# Patient Record
Sex: FEMALE | Race: WHITE | NOT HISPANIC OR LATINO | Employment: UNEMPLOYED | URBAN - METROPOLITAN AREA
[De-identification: names, ages, dates, MRNs, and addresses within clinical notes are randomized per-mention and may not be internally consistent; named-entity substitution may affect disease eponyms.]

---

## 2018-10-13 ENCOUNTER — HOSPITAL ENCOUNTER (EMERGENCY)
Facility: CLINIC | Age: 18
Discharge: HOME | End: 2018-10-13
Attending: EMERGENCY MEDICINE | Admitting: EMERGENCY MEDICINE
Payer: COMMERCIAL

## 2018-10-13 VITALS
RESPIRATION RATE: 16 BRPM | DIASTOLIC BLOOD PRESSURE: 80 MMHG | TEMPERATURE: 99.4 F | OXYGEN SATURATION: 98 % | SYSTOLIC BLOOD PRESSURE: 112 MMHG | HEART RATE: 108 BPM

## 2018-10-13 DIAGNOSIS — J02.9 SORE THROAT: ICD-10-CM

## 2018-10-13 LAB — HETEROPH AB SER QL: NEGATIVE

## 2018-10-13 PROCEDURE — 25000132 ZZH RX MED GY IP 250 OP 250 PS 637: Performed by: EMERGENCY MEDICINE

## 2018-10-13 PROCEDURE — 25000128 H RX IP 250 OP 636: Performed by: EMERGENCY MEDICINE

## 2018-10-13 PROCEDURE — 86308 HETEROPHILE ANTIBODY SCREEN: CPT | Performed by: EMERGENCY MEDICINE

## 2018-10-13 PROCEDURE — 99284 EMERGENCY DEPT VISIT MOD MDM: CPT | Mod: 25

## 2018-10-13 PROCEDURE — 96374 THER/PROPH/DIAG INJ IV PUSH: CPT

## 2018-10-13 PROCEDURE — 96361 HYDRATE IV INFUSION ADD-ON: CPT

## 2018-10-13 PROCEDURE — 96375 TX/PRO/DX INJ NEW DRUG ADDON: CPT

## 2018-10-13 RX ORDER — KETOROLAC TROMETHAMINE 15 MG/ML
15 INJECTION, SOLUTION INTRAMUSCULAR; INTRAVENOUS ONCE
Status: COMPLETED | OUTPATIENT
Start: 2018-10-13 | End: 2018-10-13

## 2018-10-13 RX ORDER — ONDANSETRON 2 MG/ML
4 INJECTION INTRAMUSCULAR; INTRAVENOUS ONCE
Status: COMPLETED | OUTPATIENT
Start: 2018-10-13 | End: 2018-10-13

## 2018-10-13 RX ORDER — HYDROCODONE BITARTRATE AND ACETAMINOPHEN 5; 325 MG/1; MG/1
1 TABLET ORAL EVERY 4 HOURS PRN
Qty: 8 TABLET | Refills: 0 | Status: SHIPPED | OUTPATIENT
Start: 2018-10-13 | End: 2021-04-08

## 2018-10-13 RX ORDER — OXYCODONE HCL 5 MG/5 ML
5 SOLUTION, ORAL ORAL ONCE
Status: COMPLETED | OUTPATIENT
Start: 2018-10-13 | End: 2018-10-13

## 2018-10-13 RX ADMIN — ONDANSETRON 4 MG: 2 INJECTION INTRAMUSCULAR; INTRAVENOUS at 08:15

## 2018-10-13 RX ADMIN — OXYCODONE HYDROCHLORIDE 5 MG: 5 SOLUTION ORAL at 08:18

## 2018-10-13 RX ADMIN — KETOROLAC TROMETHAMINE 15 MG: 15 INJECTION, SOLUTION INTRAMUSCULAR; INTRAVENOUS at 08:17

## 2018-10-13 RX ADMIN — SODIUM CHLORIDE 1000 ML: 9 INJECTION, SOLUTION INTRAVENOUS at 08:15

## 2018-10-13 ASSESSMENT — ENCOUNTER SYMPTOMS
SORE THROAT: 1
HEADACHES: 1
NAUSEA: 1
FEVER: 1

## 2018-10-13 NOTE — ED TRIAGE NOTES
Patient comes in for evaluation of sore throat. Patient states she has chronic tonsillitis. Was seen by PCP on Thursday and started on antibiotic and steroid, tested negative for strep. Patient states the pain is much worse and she can't sleep. Has been taking tylenol and ibuprofen at home without relief. Has an appointment to see a specialist on Monday about having tonsils taken out. Notes that the pain is worse when lying down than sitting up. ABCs intact.

## 2018-10-13 NOTE — DISCHARGE INSTRUCTIONS
Continue antibiotics and steroids  Continue tylenol and ibuprofen   Follow up with ENT   Use norco for more severe pain. This is a narcotic so use minimally    Discharge Instructions  Sore Throat  You were seen today for a sore throat.  Most (>80%) sore throats are caused by a virus. Antibiotics do not help with viral infections, but you can fight off the virus on your own.  In this case, your sore throat would be treated with medications for your pain and fever.    Strep throat is a kind of sore throat caused by Group A streptococcus bacteria.  This type of sore throat is treated with antibiotics.  If you had a rapid test done today for strep throat and it did not show infection, a culture is done in some cases. The culture can take several days to complete. If the culture shows you have strep throat, we will call you and get you a prescription for antibiotics. We will not contact you with a negative culture result.  Generally, every Emergency Department visit should have a follow-up clinic visit with either a primary or a specialty clinic/provider. Please follow-up as instructed by your emergency provider today.  Return to the Emergency Department if:    If you have difficulty breathing.    If you are drooling because you are unable to swallow.    You become dehydrated due to difficulty drinking. Signs of dehydration include weakness, dry mouth, and urinating less than 3 times per day.    If you develop swelling of the neck or tongue.    If you develop a high fever with either severe or unusual headache or stiff neck.    Treatment:      Pain relief -- Non-prescription pain medications, such as Tylenol  (acetaminophen) or Motrin , Advil  (ibuprofen) are usually recommended for pain.  Do not use a medicine that you are allergic to, or if your provider has told you not to use it.    Soft or liquid diet. Concentrate on liquids to keep yourself hydrated. Cold liquids (popsicles, ice cream, etc.) may feel good on your  throat.  If you were given a prescription for medicine here today, be sure to read all of the information (including the package insert) that comes with your prescription.  This will include important information about the medicine, its side effects, and any warnings that you need to know about.  The pharmacist who fills the prescription can provide more information and answer questions you may have about the medicine.  If you have questions or concerns that the pharmacist cannot address, please call or return to the Emergency Department.   Remember that you can always come back to the Emergency Department if you are not able to see your regular provider in the amount of time listed above, if you get any new symptoms, or if there is anything that worries you.

## 2018-10-13 NOTE — LETTER
October 13, 2018      To Whom It May Concern:      Nicollette M Armijos was seen in our Emergency Department today, 10/13/18.  I expect her condition to improve over the next 2-3 days.  She may return to work when improved.    Sincerely,    Lili Regan RN

## 2018-10-13 NOTE — ED AVS SNAPSHOT
[facility] Emergency Department    [address] [place] [address]    Phone:  [phone]    Fax:  [phone]                                       Nicollette M Armijos   MRN: [id number]    Department:  [facility] Emergency Department   Date of Visit:  10/13/2018           Patient Information     Date Of Birth          2000        Your diagnoses for this visit were:     Sore throat        You were seen by Frieda Wilson MD.      Follow-up Information     Follow up with Kathrin Pedraza MD.    Contact information:    [facility]  [address] [place] [address]  [phone]          Follow up with [facility] Emergency [facility].    Specialty:  EMERGENCY MEDICINE    Why:  If symptoms worsen    Contact information:    [address] [place] [address]  [phone]        Discharge Instructions       Continue antibiotics and steroids  Continue tylenol and ibuprofen   Follow up with ENT   Use norco for more severe pain. This is a narcotic so use minimally    Discharge Instructions  Sore Throat  You were seen today for a sore throat.  Most (>80%) sore throats are caused by a virus. Antibiotics do not help with viral infections, but you can fight off the virus on your own.  In this case, your sore throat would be treated with medications for your pain and fever.    Strep throat is a kind of sore throat caused by Group A streptococcus bacteria.  This type of sore throat is treated with antibiotics.  If you had a rapid test done today for strep throat and it did not show infection, a culture is done in some cases. The culture can take several days to complete. If the culture shows you have strep throat, we will call you and get you a prescription for antibiotics. We will not contact you with a negative culture result.  Generally, every Emergency Department visit should have a follow-up clinic visit with either  a primary or a specialty clinic/provider. Please follow-up as instructed by your emergency provider today.  Return to the Emergency Department if:    If you have difficulty breathing.    If you are drooling because you are unable to swallow.    You become dehydrated due to difficulty drinking. Signs of dehydration include weakness, dry mouth, and urinating less than 3 times per day.    If you develop swelling of the neck or tongue.    If you develop a high fever with either severe or unusual headache or stiff neck.    Treatment:      Pain relief -- Non-prescription pain medications, such as Tylenol  (acetaminophen) or Motrin , Advil  (ibuprofen) are usually recommended for pain.  Do not use a medicine that you are allergic to, or if your provider has told you not to use it.    Soft or liquid diet. Concentrate on liquids to keep yourself hydrated. Cold liquids (popsicles, ice cream, etc.) may feel good on your throat.  If you were given a prescription for medicine here today, be sure to read all of the information (including the package insert) that comes with your prescription.  This will include important information about the medicine, its side effects, and any warnings that you need to know about.  The pharmacist who fills the prescription can provide more information and answer questions you may have about the medicine.  If you have questions or concerns that the pharmacist cannot address, please call or return to the Emergency Department.   Remember that you can always come back to the Emergency Department if you are not able to see your regular provider in the amount of time listed above, if you get any new symptoms, or if there is anything that worries you.      24 Hour Appointment Hotline       To make an appointment at any [facility], call [phone] ([phone]). If you don't have a family doctor or clinic, we will help you find one. [facility] clinics are conveniently located to serve the needs  of you and your family.             Review of your medicines      START taking        Dose / Directions Last dose taken    HYDROcodone-acetaminophen 5-325 MG per tablet   Commonly known as:  NORCO   Dose:  1 tablet   Quantity:  8 tablet        Take 1 tablet by mouth every 4 hours as needed for pain   Refills:  0          Our records show that you are taking the medicines listed below. If these are incorrect, please call your family doctor or clinic.        Dose / Directions Last dose taken    ZOLOFT PO        Take by mouth daily   Refills:  0                Information about OPIOIDS     PRESCRIPTION OPIOIDS: WHAT YOU NEED TO KNOW   We gave you an opioid (narcotic) pain medicine. It is important to manage your pain, but opioids are not always the best choice. You should first try all the other options your care team gave you. Take this medicine for as short a time (and as few doses) as possible.    Some activities can increase your pain, such as bandage changes or therapy sessions. It may help to take your pain medicine 30 to 60 minutes before these activities. Reduce your stress by getting enough sleep, working on hobbies you enjoy and practicing relaxation or meditation. Talk to your care team about ways to manage your pain beyond prescription opioids.    These medicines have risks:    DO NOT drive when on new or higher doses of pain medicine. These medicines can affect your alertness and reaction times, and you could be arrested for driving under the influence (DUI). If you need to use opioids long-term, talk to your care team about driving.    DO NOT operate heavy machinery    DO NOT do any other dangerous activities while taking these medicines.    DO NOT drink any alcohol while taking these medicines.     If the opioid prescribed includes acetaminophen, DO NOT take with any other medicines that contain acetaminophen. Read all labels carefully. Look for the word  acetaminophen  or  Tylenol.  Ask your pharmacist  if you have questions or are unsure.    You can get addicted to pain medicines, especially if you have a history of addiction (chemical, alcohol or substance dependence). Talk to your care team about ways to reduce this risk.    All opioids tend to cause constipation. Drink plenty of water and eat foods that have a lot of fiber, such as fruits, vegetables, prune juice, apple juice and high-fiber cereal. Take a laxative (Miralax, milk of magnesia, Colace, Senna) if you don t move your bowels at least every other day. Other side effects include upset stomach, sleepiness, dizziness, throwing up, tolerance (needing more of the medicine to have the same effect), physical dependence and slowed breathing.    Store your pills in a secure place, locked if possible. We will not replace any lost or stolen medicine. If you don t finish your medicine, please throw away (dispose) as directed by your pharmacist. The [place] [employer] has more information about safe disposal: https://www.pca.[facility].[place].us/living-green/managing-unwanted-medications        Prescriptions were sent or printed at these locations (1 Prescription)                   Other Prescriptions                Printed at Department/Unit printer (1 of 1)         HYDROcodone-acetaminophen (NORCO) 5-325 MG per tablet                Procedures and tests performed during your visit     Mononucleosis screen      Orders Needing Specimen Collection     None      Pending Results     No orders found from 10/11/2018 to 10/14/2018.            Pending Culture Results     No orders found from 10/11/2018 to 10/14/2018.            Pending Results Instructions     If you had any lab results that were not finalized at the time of your Discharge, you can call the ED Lab Result RN at [phone]. You will be contacted by this team for any positive Lab results or changes in treatment. The nurses are available 7 days a week from 10A to 6:30P.  You can leave a message 24  hours per day and they will return your call.        Test Results From Your Hospital Stay        10/13/2018  8:54 AM      Component Results     Component Value Ref Range & Units Status    Mononucleosis Screen Negative NEG^Negative Final                Clinical Quality Measure: Blood Pressure Screening     Your blood pressure was checked while you were in the emergency department today. The last reading we obtained was  BP: 103/78 . Please read the guidelines below about what these numbers mean and what you should do about them.  If your systolic blood pressure (the top number) is less than 120 and your diastolic blood pressure (the bottom number) is less than 80, then your blood pressure is normal. There is nothing more that you need to do about it.  If your systolic blood pressure (the top number) is 120-139 or your diastolic blood pressure (the bottom number) is 80-89, your blood pressure may be higher than it should be. You should have your blood pressure rechecked within a year by a primary care provider.  If your systolic blood pressure (the top number) is 140 or greater or your diastolic blood pressure (the bottom number) is 90 or greater, you may have high blood pressure. High blood pressure is treatable, but if left untreated over time it can put you at risk for heart attack, stroke, or kidney failure. You should have your blood pressure rechecked by a primary care provider within the next 4 weeks.  If your provider in the emergency department today gave you specific instructions to follow-up with your doctor or provider even sooner than that, you should follow that instruction and not wait for up to 4 weeks for your follow-up visit.        Thank you for choosing [facility]       Thank you for choosing [facility] for your care. Our goal is always to provide you with excellent care. Hearing back from our patients is one way we can continue to improve our services. Please take a few minutes to complete the written  "survey that you may receive in the mail after you visit with us. Thank you!        [employer] Information     [facility] lets you send messages to your doctor, view your test results, renew your prescriptions, schedule appointments and more. To sign up, go to [email or web address] . Click on \"Log in\" on the left side of the screen, which will take you to the Welcome page. Then click on \"Sign up Now\" on the right side of the page.     You will be asked to enter the access code listed below, as well as some personal information. Please follow the directions to create your username and password.     Your access code is: [email or web address]  Expires: 2019  9:16 AM     Your access code will  in 90 days. If you need help or a new code, please call your [facility] clinic or [phone].        Care EveryWhere ID     This is your Care EveryWhere ID. This could be used by other organizations to access your [facility] medical records  [id number]        Equal Access to Services     SANDOVAL WILD : Hadii jovan ernandezo Sojann, waaxda luqadaha, qaybta kaalmaawa reyse, mary aguilar . So [facility] [phone].    ATENCIÓN: Si habla español, tiene a church disposición servicios gratuitos de asistencia lingüística. Llame al [phone].    We comply with applicable federal civil rights laws and [place] laws. We do not discriminate on the basis of race, color, national origin, age, disability, sex, sexual orientation, or gender identity.            After Visit Summary       This is your record. Keep this with you and show to your community pharmacist(s) and doctor(s) at your next visit.                  "

## 2018-10-13 NOTE — LETTER
10/13/18      To Whom it may concern:    Shekhar Eden was in our Emergency Department today, 10/13/18. With a patient who needed their assistance.  Please excuse them from work today.      Sincerely,    Litzy Regan RN

## 2018-10-13 NOTE — ED PROVIDER NOTES
History     Chief Complaint:  Sore Throat    HPI Nicollette M Armijos is a 18 year old female with a history of recurrent tonsillitis who presents with a sore throat. The patient reports that 2 days ago she visited [facility] for a sore throat, but her rapid strept test was negative.  She states she was diagnosed with tonsillitis and started on a 10 day course of Augmentin and a 5 day course of 40 mg prednisone. She notes her sore throat has only gotten worse, despite taking these medications as well as tylenol and ibuprofen. Her last dose of OTC pain medications was last night. She reports she gets tonsillitis often, but has not had a sore throat as bad as this for a while, prompting her presentation to the ED today. She states her sore throat is worse whenever she yawns or swallow and makes eating painful. She notes her sore throat is also accompanied by a fever (max temp 99 at home), headache, nasal congestion, ear pain and nausea.     Allergies:  No known drug allergies    Medications:    Zoloft  Augmentin  Prednisone    Past Medical History:    Asthma  Ovarian cyst  Recurrent tonsillitis     Past Surgical History:    Dilation and curettage suction  Esophagogastroduodenoscopy  Intrauterine device insertion  Sigmoidoscopy flexible     Family History:    History reviewed. No pertinent family history.     Social History:  Marital Status:    Smoking status: Never Smoker  Alcohol use: No     Review of Systems   Constitutional: Positive for fever.   HENT: Positive for congestion (nasal), ear pain and sore throat.    Gastrointestinal: Positive for nausea.   Neurological: Positive for headaches.   All other systems reviewed and are negative.    Physical Exam     Patient Vitals for the past 24 hrs:   BP Temp Temp src Pulse Resp SpO2   10/13/18 0915 112/80 - - - - 98 %   10/13/18 0900 113/74 - - - - 98 %   10/13/18 0845 110/77 - - - - 100 %   10/13/18 0830 116/80 - - - - 99 %   10/13/18 0815 103/78 -  - - - -   10/13/18 0800 113/76 - - - - 99 %   10/13/18 0745 106/80 - - - - 98 %   10/13/18 0730 109/75 - - - - 99 %   10/13/18 0705 129/82 99.4  F (37.4  C) Oral 108 16 98 %       Physical Exam  General: Resting comfortably on the gurney  Eyes:  The pupils are equal and round    Conjunctivae and sclerae are normal  ENT:    Moist mucous membranes, mild posterior oropharynx erythema with no exudates, no trismus, voice normal, no facial swelling, mild tenderness  On anterior cervical lymphadenopathy. Floor of mouth soft  Neck:  Normal range of motion  CV:  Tachcyardic rate and rhythm    Skin warm and well perfused   Resp:  Lungs are clear    Non-labored    No rales    No wheezing   GI:  Abdomen is soft, there is no rigidity    No distension    No rebound tenderness     No abdominal tenderness  MS:  Normal muscular tone  Skin:  No rash or acute skin lesions noted  Neuro:   Awake, alert.      Speech is normal and fluent.    Face is symmetric.     Moves all extremities equally  Psych: Normal affect.  Appropriate interactions.    Emergency Department Course   Interventions:  0815: NS 1L IV Bolus  0815: Zofran 4 mg IV  0817: Toradol 15 mg IV  0818: Oxycodone 5 mg PO    Emergency Department Course:  Past medical records, nursing notes, and vitals reviewed.  0735: I performed an exam of the patient and obtained history, as documented above.   Blood samples were collected and sent for laboratory testing, findings above.    0752: I rechecked the patient. Explained findings to the patient.    0901: I rechecked the patient. Explained findings to the patient. She reported to being able to swallow with less discomfort.    Findings and plan explained to the patient. Patient discharged home with instructions regarding supportive care, medications, and reasons to return. The importance of close follow-up was reviewed.    Impression & Plan      Medical Decision Making:  Nicollette M Armijos is a 18 year old female who presents for  evaluation of a sore throat and clinical evidence of pharyngitis. The patient had a negative rapid strep 2 days ago at Urgent Care. Mononucleosis screen was negative today. There is no clinical evidence of peritonsillar abscess, retropharyngeal abscess, Lemierre's Syndrome, epiglottis, or Camilo's angina. The etiology is most likely viral.  I have recommended treatment with analgesics. Return if increasing pain, change in voice, neck pain, vomiting, fever, or shortness of breath. Has ENT f/u in 2 days. Given well appearance, I would not test further for other etiologies of serious bacterial infections.     Diagnosis:    ICD-10-CM   1. Sore throat J02.9       Disposition:  discharged to home    Discharge Medications:  New Prescriptions    HYDROCODONE-ACETAMINOPHEN (NORCO) 5-325 MG PER TABLET    Take 1 tablet by mouth every 4 hours as needed for pain         Roberta Sheriff  10/13/2018   [facility] EMERGENCY DEPARTMENT  I, Roberta Sheriff, am serving as a scribe at 7:35 AM on 10/13/2018 to document services personally performed by Frieda Wilson MD, based on my observations and the provider's statements to me.        Frieda Wilson MD  10/13/18 [id number]

## 2018-10-13 NOTE — ED AVS SNAPSHOT
[facility] Emergency Department    [address]    [facility] [address]    Phone:  [phone]    Fax:  [phone]                                       Nicollette M Armijos   MRN: [id number]    Department:  [facility] Emergency Department   Date of Visit:  10/13/2018           After Visit Summary Signature Page     I have received my discharge instructions, and my questions have been answered. I have discussed any challenges I see with this plan with the nurse or doctor.    ..........................................................................................................................................  Patient/Patient Representative Signature      ..........................................................................................................................................  Patient Representative Print Name and Relationship to Patient    ..................................................               ................................................  Date                                   Time    ..........................................................................................................................................  Reviewed by Signature/Title    ...................................................              ..............................................  Date                                               Time          22EPIC Rev 08/18

## 2018-10-14 ENCOUNTER — HEALTH MAINTENANCE LETTER (OUTPATIENT)
Age: 18
End: 2018-10-14

## 2018-11-07 ENCOUNTER — HOSPITAL ENCOUNTER (EMERGENCY)
Facility: CLINIC | Age: 18
Discharge: HOME | End: 2018-11-07
Attending: PHYSICIAN ASSISTANT | Admitting: PHYSICIAN ASSISTANT
Payer: COMMERCIAL

## 2018-11-07 VITALS
WEIGHT: 125 LBS | HEIGHT: 66 IN | SYSTOLIC BLOOD PRESSURE: 113 MMHG | BODY MASS INDEX: 20.09 KG/M2 | TEMPERATURE: 98.1 F | OXYGEN SATURATION: 96 % | RESPIRATION RATE: 18 BRPM | DIASTOLIC BLOOD PRESSURE: 64 MMHG

## 2018-11-07 DIAGNOSIS — Z90.89 POST-TONSILLECTOMY PAIN: ICD-10-CM

## 2018-11-07 DIAGNOSIS — G89.18 POST-TONSILLECTOMY PAIN: ICD-10-CM

## 2018-11-07 DIAGNOSIS — J95.830 POST-TONSILLECTOMY HEMORRHAGE: ICD-10-CM

## 2018-11-07 PROCEDURE — 25000125 ZZHC RX 250: Performed by: PHYSICIAN ASSISTANT

## 2018-11-07 PROCEDURE — 25000128 H RX IP 250 OP 636: Performed by: PHYSICIAN ASSISTANT

## 2018-11-07 PROCEDURE — 96374 THER/PROPH/DIAG INJ IV PUSH: CPT

## 2018-11-07 PROCEDURE — 96375 TX/PRO/DX INJ NEW DRUG ADDON: CPT

## 2018-11-07 PROCEDURE — 99284 EMERGENCY DEPT VISIT MOD MDM: CPT

## 2018-11-07 RX ORDER — KETOROLAC TROMETHAMINE 15 MG/ML
15 INJECTION, SOLUTION INTRAMUSCULAR; INTRAVENOUS ONCE
Status: COMPLETED | OUTPATIENT
Start: 2018-11-07 | End: 2018-11-07

## 2018-11-07 RX ORDER — DEXAMETHASONE SODIUM PHOSPHATE 4 MG/ML
10 INJECTION, SOLUTION INTRA-ARTICULAR; INTRALESIONAL; INTRAMUSCULAR; INTRAVENOUS; SOFT TISSUE ONCE
Status: COMPLETED | OUTPATIENT
Start: 2018-11-07 | End: 2018-11-07

## 2018-11-07 RX ADMIN — LIDOCAINE HYDROCHLORIDE 5 ML: 20 SOLUTION ORAL; TOPICAL at 15:28

## 2018-11-07 RX ADMIN — DEXAMETHASONE SODIUM PHOSPHATE 10 MG: 4 INJECTION, SOLUTION INTRAMUSCULAR; INTRAVENOUS at 13:45

## 2018-11-07 RX ADMIN — KETOROLAC TROMETHAMINE 15 MG: 15 INJECTION, SOLUTION INTRAMUSCULAR; INTRAVENOUS at 13:47

## 2018-11-07 ASSESSMENT — ENCOUNTER SYMPTOMS
CHILLS: 0
VOMITING: 0
NECK PAIN: 1
NAUSEA: 1
FEVER: 0
NECK STIFFNESS: 0
SORE THROAT: 1

## 2018-11-07 NOTE — ED TRIAGE NOTES
Patient states she had tonsils removed 8 days ago and was recovering from surgery fine until last night when she began having throat and ear pain. Patient states she began spitting up blood this morning. ABC Intact alert and no distress.

## 2018-11-07 NOTE — ED PROVIDER NOTES
"  History     Chief Complaint:  Post-op Bleeding    HPI   Nicollette M Armijos is a 18 year old female 8 days status post tonsillectomy who presents to the emergency department today for evaluation of post operative bleeding. The patient reports that she had been recovering otherwise well until this morning when she developed worsening pain in her ears and throat and began spitting up blood. She furthers that the blood began one hour ago and was heavy initially, however this has slowed and is now going down her throat. These things prompted her to call the nurse line who referred her to the ED for evaluation. Here the patient reports that she has been managing her pain with norco and ibuprofen, both of which were providing relief until last night. She endorses posterior neck and nausea due to the blood in her throat. She also explains that she had begun the transition to solid foods, however she consumed some mashed potatoes last night that \"burned her throat\" and she now has pain when consuming any solid or liquid. The patient denies fever, chills, vomiting, or difficulty with her range of motion of her neck. Of note, the patient has follow up scheduled with ENT for 11/27/18.    Allergies:  No Known Drug Allergies     Medications:    Norco  Zoloft    Past Medical History:    Asthma  Constipation  Ovarian cyst    Past Surgical History:    D&C  Insert intrauterine device  Sigmoidoscopy flexible    Family History:    Family history reviewed. No pertinent family history.    Social History:  Smoking Status: Never Smoker  Smokeless Tobacco: Never Used  Alcohol Use: Negative  Marital Status:        Review of Systems   Constitutional: Negative for chills and fever.   HENT: Positive for ear pain and sore throat (and bleeding).    Gastrointestinal: Positive for nausea. Negative for vomiting.   Musculoskeletal: Positive for neck pain. Negative for neck stiffness.     Physical Exam     Patient Vitals for the past 24 " "hrs:   BP Temp Temp src Heart Rate Resp SpO2 Height Weight   11/07/18 1500 113/64 - - - - 96 % - -   11/07/18 1459 - - - - - 98 % - -   11/07/18 1445 - - - - - 97 % - -   11/07/18 1407 - - - - - 98 % - -   11/07/18 1400 102/64 - - - - 97 % - -   11/07/18 1347 - - - - - 98 % - -   11/07/18 1330 111/74 - - - - - - -   11/07/18 1308 124/86 98.1  F (36.7  C) Oral 92 18 98 % 1.676 m (5' 6\") 56.7 kg (125 lb)     Physical Exam  General: Alert and interactive. Appears well. Cooperative and pleasant.   Eyes: The pupils are equal and round. EOMs intact. No scleral icterus.  ENT: No abnormalities to the external nose or ears. Mucous membranes moist. Slight oozing from right tonsillar bed. Left tonsillar bed eschar is healing well. Some localized erythema to posterior oropharynx. TMs clear and reflective of light bilaterally.    Neck: Trachea is in the midline. No nuchal rigidity.  Full range of motion.  CV: Regular rate and rhythm. S1 and S2 normal without murmur, click, gallop or rub.   Resp: Breath sounds are clear bilaterally, without rhonchi, wheezes, rales. Non-labored, no retractions or accessory muscle use.     GI: Abdomen is soft without distension. No tenderness to palpation. No peritoneal signs.    MS: Moving all extremities well. Good muscle tone.   Skin: Warm and dry. No rash or lesions noted.  Neuro: Alert and oriented x 3. No focal neurologic deficits. Good strength and sensation in upper and lower extremities. Psych: Awake. Alert.  Normal affect. Appropriate interactions.  Lymph: No anterior or posterior cervical lymphadenopathy noted.    Emergency Department Course     Interventions:  1345 Decadron 10 mg IV  1347 Toradol 15 mg IV  1528 Xylocaine 5 ml Oral    Emergency Department Course:    1306 Nursing notes and vitals reviewed.    1311 I performed an exam of the patient as documented above.     1531 I personally answered all related questions prior to discharge.    Impression & Plan      Medical Decision " Making:  Nicollette M Armijos is a 18 year old female 8 days status post tonsillectomy who presents to the emergency department today for evaluation of an increased pain and bleeding. She has not had any fever or chills but is having some difficulty eating and drinking due to pain in her oropharynx. On exam, she does have some oozing from the right tonsillar bed, although this is not bleeding profusely. This was well controlled here with some ice water gargles. For her pain here, I provided her with Toradol, Decadron, and Viscous Lidocaine rinse. She was improved and the bleeding was stopped on re-examination. I believe she is safe to return home. She was given viscous lidocaine to swish and spit if she has increasing pain, as this will help her be able to eat and drink. She can then take 800 mg ibuprofen 3x daily to help with inflammation. She should also call her ENT in the next few days and see if she can get in sooner for a follow up. She can take hydrocodone as needed if the ibuprofen is not helping her symptoms. Of course, if the patient has profuse bleeding that is not controlled by the ice water gargles, new fever chills, swelling in throat, difficulty swallowing or breathing, she should return immediately to the ED.    Diagnosis:    ICD-10-CM    1. Post-tonsillectomy hemorrhage J95.830    2. Post-tonsillectomy pain G89.18     Z90.89      Disposition:   The patient is discharged to home.    Discharge Medications:  Discharge Medication List as of 11/7/2018  3:20 PM      START taking these medications    Details   lidocaine, viscous, (XYLOCAINE) 2 % solution Take 15 mLs by mouth every 4 hours as needed for moderate pain swish and spit every 3-8 hours as needed; max 8 doses/24 hour period, Disp-100 mL, R-0, Local Print           Scribe Disclosure:  I, Jamia Raymond, am serving as a scribe at 1:16 PM on 11/7/2018 to document services personally performed by Marianne Matthews PA-C based on my observations  and the provider's statements to me.    [facility] [place] EMERGENCY DEPARTMENT       Marianne Matthews PA-C  11/07/18 9354

## 2018-11-07 NOTE — ED AVS SNAPSHOT
[facility] Emergency Department    [address]    [facility] [address]    Phone:  [phone]    Fax:  [phone]                                       Nicollette M Armijos   MRN: [id number]    Department:  [facility] Emergency Department   Date of Visit:  11/7/2018           Patient Information     Date Of Birth          2000        Your diagnoses for this visit were:     Post-tonsillectomy hemorrhage     Post-tonsillectomy pain        You were seen by Marianne Matthews PA-C.      Follow-up Information     Schedule an appointment as soon as possible for a visit with Janes Morrison DO.    Contact information:    [address]  [address] [facility] [address]  [phone]          Follow up with [facility] Emergency Department.    Specialty:  EMERGENCY MEDICINE    Why:  If symptoms worsen    Contact information:    [address]  [facility] [address] [phone]        Discharge Instructions         Managing Post-Op Pain at Home  Pain is expected after surgery. Know that you have a right to have this pain controlled. Managing pain helps you recover faster. Less pain means you can be active sooner. It also means less stress on the body and mind, which will help your body heal. When you go home after surgery, you will take charge of your pain management.  What is post-op pain?  Pain after surgery (post-op pain) is normal. How much pain you feel depends on the surgery. Your use of pain medicines and your sensitivity to pain are also factors. Each person feels pain differently. So try not to compare your pain with someone else s. Your healthcare team will need to know how you are feeling. Be honest. If you are in pain, say so.  Measuring your pain  A pain scale helps you rate pain intensity. In the scale, 0 means no pain, and 10 is the worst pain possible. Pain scales are not used to compare your pain  with another person's pain. A pain scale is used only to measure how your pain changes for you. You should rate your pain every few hours. You may feel some pain even with medicines. It is important to tell your healthcare provider if medicines don't reduce the pain. Be sure to mention if the pain suddenly increases or changes.     Your recovery  Your first hours at home, you may feel groggy or tired from the medicines given during surgery. As pain management methods used during surgery wear off, pain may increase. So be sure not to skip a dose of prescribed medicine. In fact, set an alarm or have someone remind you when it s time to take your medicine. During this time, try to rest, even if you feel pretty good. Within the first 24 hours, a nurse or other healthcare provider is likely to call and ask how you re doing.   Medicines for pain  Medicines can help to block pain, limit swelling and control related problems. You may be given more than one medicine to treat your pain. Medicines may be changed as you feel better, or if they cause side effects.   Pain medicine can be given in several ways: by injection, by mouth, as a patch, or as a suppository.  Medicines What they do Possible side effects   Non-steroidal anti-inflammatory drugs (NSAIDs) Reduce mild to moderate pain. They also help reduce swelling. Nausea, stomach and digestive problems, and kidney and liver problems. Certain NSAIDs may increase the risk for cardiovascular disease in some people.   Opioids (morphine and similar medicines often called narcotics) Reduce moderate to severe pain Nausea, vomiting, itching, drowsiness, constipation, slowed or shallow breathing   Other pain relievers (analgesics) Reduce mild to severe pain Constipation, nausea, dizziness, drowsiness, kidney and liver problems   Anti-vomiting medicine (antiemetics) Manage nausea Dizziness, drowsiness, muscle spasms   Seizure medicines (anticonvulsants) Manage nerve-related pain  Drowsiness, dizziness, liver problems   Medicines for depression (antidepressants) Manage chronic pain Dry mouth, drowsiness, dizziness, constipation   Non-medicine pain relief  Medicines are not the only way to manage pain after surgery.   You can use ice to help reduce swelling and pain. Use a cold pack or bag of ice cubes wrapped in a thin cloth. Never put ice directly on your skin. Use the ice for up to 20 minutes at a time every 3 to 4 hours.   You can also reduce swelling and pain by keeping the operated area above the level of your heart if you can. This helps blood and other fluids drain from the area.   You can also use relaxation to reduce pain. Try these techniques:    Visualization or guided imagery. You picture yourself in a quiet, peaceful place to help take your mind off the pain.    Progressive body relaxation. Starting at your feet, you clench and release your muscles. Work up your body slowly until you reach your neck and face.    Deep breathing. Breathe in deeply and hold your breath for a few seconds. Then exhale slowly to help relax your body.   Tips for controlling pain    Give pain medicine time to work. Most pain relievers taken by mouth need at least 20 to 30 minutes to take effect. They may not reach their maximum effect for close to an hour.     Take pain medicine at regular times as directed. Don t wait until the pain gets bad to take it.    Get plenty of rest. Taking your medicine at night may help you get a good night s rest.    If pain lessens, try taking your medicine less often or in smaller doses.  Safety tips for taking pain medicines    Ask your pharmacist if you need to take the medicine with food or milk to avoid an upset stomach.    Don t break, crush, or cut in half any long-acting medicines. This could be harmful.    Don t take more medicine than directed. If your pain isn t relieved, call your healthcare provider.    Try to time your medicine so that you take it before  starting an activity, such as dressing or sitting at the table for dinner.    Constipation is a common side effect with some pain medicines. Eating fruit, vegetables, and other foods high in fiber can help. Also drink plenty of fluids.    Don t drink alcohol while you are taking pain medicine. This can make you dizzy and slow your breathing. It can even be fatal.    Don t drive if you are taking opioid medicines.    Don t take opioids in combination with benzodiazepines. Doing so can cause serious health problems. These include extreme sleepiness, slowed breathing, and death. Let your healthcare provider know if you are taking benzodiazepines.     When to call your healthcare provider  Call your healthcare provider right away if:    Your pain is not relieved or if it gets worse    You can't take your pain medicines as prescribed    You have severe side effects like breathing problems, trouble waking up, dizziness, confusion, or severe constipation   Date Last Reviewed: 12/1/2017 2000-2018 [employer]. [address] [place] [address]. All rights reserved. This information is not intended as a substitute for professional medical care. Always follow your healthcare professional's instructions.          24 Hour Appointment Hotline       To make an appointment at any [facility], call [phone] ([phone]). If you don't have a family doctor or clinic, we will help you find one. [facility] clinics are conveniently located to serve the needs of you and your family.             Review of your medicines      START taking        Dose / Directions Last dose taken    lidocaine (viscous) 2 % solution   Commonly known as:  XYLOCAINE   Dose:  15 mL   Quantity:  100 mL        Take 15 mLs by mouth every 4 hours as needed for moderate pain swish and spit every 3-8 hours as needed; max 8 doses/24 hour period   Refills:  0          Our records show that you are taking the medicines listed below.  If these are incorrect, please call your family doctor or clinic.        Dose / Directions Last dose taken    HYDROcodone-acetaminophen 5-325 MG per tablet   Commonly known as:  NORCO   Dose:  1 tablet   Quantity:  8 tablet        Take 1 tablet by mouth every 4 hours as needed for pain   Refills:  0        ZOLOFT PO        Take by mouth daily   Refills:  0                Prescriptions were sent or printed at these locations (1 Prescription)                   Other Prescriptions                Printed at Department/Unit printer (1 of 1)         lidocaine, viscous, (XYLOCAINE) 2 % solution                Orders Needing Specimen Collection     None      Pending Results     No orders found from 11/5/2018 to 11/8/2018.            Pending Culture Results     No orders found from 11/5/2018 to 11/8/2018.            Pending Results Instructions     If you had any lab results that were not finalized at the time of your Discharge, you can call the ED Lab Result RN at [phone]. You will be contacted by this team for any positive Lab results or changes in treatment. The nurses are available 7 days a week from 10A to 6:30P.  You can leave a message 24 hours per day and they will return your call.        Test Results From Your Hospital Stay               Clinical Quality Measure: Blood Pressure Screening     Your blood pressure was checked while you were in the emergency department today. The last reading we obtained was  BP: 113/64 . Please read the guidelines below about what these numbers mean and what you should do about them.  If your systolic blood pressure (the top number) is less than 120 and your diastolic blood pressure (the bottom number) is less than 80, then your blood pressure is normal. There is nothing more that you need to do about it.  If your systolic blood pressure (the top number) is 120-139 or your diastolic blood pressure (the bottom number) is 80-89, your blood pressure may be higher than it should be.  "You should have your blood pressure rechecked within a year by a primary care provider.  If your systolic blood pressure (the top number) is 140 or greater or your diastolic blood pressure (the bottom number) is 90 or greater, you may have high blood pressure. High blood pressure is treatable, but if left untreated over time it can put you at risk for heart attack, stroke, or kidney failure. You should have your blood pressure rechecked by a primary care provider within the next 4 weeks.  If your provider in the emergency department today gave you specific instructions to follow-up with your doctor or provider even sooner than that, you should follow that instruction and not wait for up to 4 weeks for your follow-up visit.        Thank you for choosing [facility]       Thank you for choosing [facility] for your care. Our goal is always to provide you with excellent care. Hearing back from our patients is one way we can continue to improve our services. Please take a few minutes to complete the written survey that you may receive in the mail after you visit with us. Thank you!        [facility] Information     [facility] lets you send messages to your doctor, view your test results, renew your prescriptions, schedule appointments and more. To sign up, go to [email or web address] . Click on \"Log in\" on the left side of the screen, which will take you to the Welcome page. Then click on \"Sign up Now\" on the right side of the page.     You will be asked to enter the access code listed below, as well as some personal information. Please follow the directions to create your username and password.     Your access code is: [email or web address]  Expires: 2019  8:16 AM     Your access code will  in 90 days. If you need help or a new code, please call your [facility] clinic or [phone].        Care EveryWhere ID     This is your Care EveryWhere ID. This could be used by other organizations to access your [facility] medical " records  [id number]        Equal Access to Services     SANDOVAL WILD : Schuyler Valle, walter bethea, mary stewart. So [facility] [phone].    ATENCIÓN: Si habla español, tiene a church disposición servicios gratuitos de asistencia lingüística. Llame al [phone].    We comply with applicable federal civil rights laws and [place] laws. We do not discriminate on the basis of race, color, national origin, age, disability, sex, sexual orientation, or gender identity.            After Visit Summary       This is your record. Keep this with you and show to your community pharmacist(s) and doctor(s) at your next visit.

## 2018-11-07 NOTE — ED AVS SNAPSHOT
[facility] Emergency Department    [address]    [facility] [address]    Phone:  [phone]    Fax:  [phone]                                       Nicollette M Armijos   MRN: [id number]    Department:  [facility] Emergency Department   Date of Visit:  11/7/2018           After Visit Summary Signature Page     I have received my discharge instructions, and my questions have been answered. I have discussed any challenges I see with this plan with the nurse or doctor.    ..........................................................................................................................................  Patient/Patient Representative Signature      ..........................................................................................................................................  Patient Representative Print Name and Relationship to Patient    ..................................................               ................................................  Date                                   Time    ..........................................................................................................................................  Reviewed by Signature/Title    ...................................................              ..............................................  Date                                               Time          22EPIC Rev 08/18

## 2018-11-07 NOTE — DISCHARGE INSTRUCTIONS
Managing Post-Op Pain at Home  Pain is expected after surgery. Know that you have a right to have this pain controlled. Managing pain helps you recover faster. Less pain means you can be active sooner. It also means less stress on the body and mind, which will help your body heal. When you go home after surgery, you will take charge of your pain management.  What is post-op pain?  Pain after surgery (post-op pain) is normal. How much pain you feel depends on the surgery. Your use of pain medicines and your sensitivity to pain are also factors. Each person feels pain differently. So try not to compare your pain with someone else s. Your healthcare team will need to know how you are feeling. Be honest. If you are in pain, say so.  Measuring your pain  A pain scale helps you rate pain intensity. In the scale, 0 means no pain, and 10 is the worst pain possible. Pain scales are not used to compare your pain with another person's pain. A pain scale is used only to measure how your pain changes for you. You should rate your pain every few hours. You may feel some pain even with medicines. It is important to tell your healthcare provider if medicines don't reduce the pain. Be sure to mention if the pain suddenly increases or changes.     Your recovery  Your first hours at home, you may feel groggy or tired from the medicines given during surgery. As pain management methods used during surgery wear off, pain may increase. So be sure not to skip a dose of prescribed medicine. In fact, set an alarm or have someone remind you when it s time to take your medicine. During this time, try to rest, even if you feel pretty good. Within the first 24 hours, a nurse or other healthcare provider is likely to call and ask how you re doing.   Medicines for pain  Medicines can help to block pain, limit swelling and control related problems. You may be given more than one medicine to treat your pain. Medicines may be changed as you feel  better, or if they cause side effects.   Pain medicine can be given in several ways: by injection, by mouth, as a patch, or as a suppository.  Medicines What they do Possible side effects   Non-steroidal anti-inflammatory drugs (NSAIDs) Reduce mild to moderate pain. They also help reduce swelling. Nausea, stomach and digestive problems, and kidney and liver problems. Certain NSAIDs may increase the risk for cardiovascular disease in some people.   Opioids (morphine and similar medicines often called narcotics) Reduce moderate to severe pain Nausea, vomiting, itching, drowsiness, constipation, slowed or shallow breathing   Other pain relievers (analgesics) Reduce mild to severe pain Constipation, nausea, dizziness, drowsiness, kidney and liver problems   Anti-vomiting medicine (antiemetics) Manage nausea Dizziness, drowsiness, muscle spasms   Seizure medicines (anticonvulsants) Manage nerve-related pain Drowsiness, dizziness, liver problems   Medicines for depression (antidepressants) Manage chronic pain Dry mouth, drowsiness, dizziness, constipation   Non-medicine pain relief  Medicines are not the only way to manage pain after surgery.   You can use ice to help reduce swelling and pain. Use a cold pack or bag of ice cubes wrapped in a thin cloth. Never put ice directly on your skin. Use the ice for up to 20 minutes at a time every 3 to 4 hours.   You can also reduce swelling and pain by keeping the operated area above the level of your heart if you can. This helps blood and other fluids drain from the area.   You can also use relaxation to reduce pain. Try these techniques:    Visualization or guided imagery. You picture yourself in a quiet, peaceful place to help take your mind off the pain.    Progressive body relaxation. Starting at your feet, you clench and release your muscles. Work up your body slowly until you reach your neck and face.    Deep breathing. Breathe in deeply and hold your breath for a few  seconds. Then exhale slowly to help relax your body.   Tips for controlling pain    Give pain medicine time to work. Most pain relievers taken by mouth need at least 20 to 30 minutes to take effect. They may not reach their maximum effect for close to an hour.     Take pain medicine at regular times as directed. Don t wait until the pain gets bad to take it.    Get plenty of rest. Taking your medicine at night may help you get a good night s rest.    If pain lessens, try taking your medicine less often or in smaller doses.  Safety tips for taking pain medicines    Ask your pharmacist if you need to take the medicine with food or milk to avoid an upset stomach.    Don t break, crush, or cut in half any long-acting medicines. This could be harmful.    Don t take more medicine than directed. If your pain isn t relieved, call your healthcare provider.    Try to time your medicine so that you take it before starting an activity, such as dressing or sitting at the table for dinner.    Constipation is a common side effect with some pain medicines. Eating fruit, vegetables, and other foods high in fiber can help. Also drink plenty of fluids.    Don t drink alcohol while you are taking pain medicine. This can make you dizzy and slow your breathing. It can even be fatal.    Don t drive if you are taking opioid medicines.    Don t take opioids in combination with benzodiazepines. Doing so can cause serious health problems. These include extreme sleepiness, slowed breathing, and death. Let your healthcare provider know if you are taking benzodiazepines.     When to call your healthcare provider  Call your healthcare provider right away if:    Your pain is not relieved or if it gets worse    You can't take your pain medicines as prescribed    You have severe side effects like breathing problems, trouble waking up, dizziness, confusion, or severe constipation   Date Last Reviewed: 12/1/2017 2000-2018 [employer]  [address] [place] [address]. All rights reserved. This information is not intended as a substitute for professional medical care. Always follow your healthcare professional's instructions.

## 2019-03-12 ENCOUNTER — APPOINTMENT (OUTPATIENT)
Dept: GENERAL RADIOLOGY | Facility: CLINIC | Age: 19
End: 2019-03-12
Attending: NURSE PRACTITIONER

## 2019-03-12 ENCOUNTER — HOSPITAL ENCOUNTER (EMERGENCY)
Facility: CLINIC | Age: 19
Discharge: HOME | End: 2019-03-12
Attending: NURSE PRACTITIONER | Admitting: NURSE PRACTITIONER

## 2019-03-12 VITALS
HEART RATE: 139 BPM | RESPIRATION RATE: 16 BRPM | TEMPERATURE: 99 F | DIASTOLIC BLOOD PRESSURE: 70 MMHG | OXYGEN SATURATION: 98 % | SYSTOLIC BLOOD PRESSURE: 101 MMHG

## 2019-03-12 DIAGNOSIS — J18.9 PNEUMONIA: ICD-10-CM

## 2019-03-12 PROCEDURE — 25000132 ZZH RX MED GY IP 250 OP 250 PS 637: Performed by: NURSE PRACTITIONER

## 2019-03-12 PROCEDURE — 71046 X-RAY EXAM CHEST 2 VIEWS: CPT

## 2019-03-12 PROCEDURE — 99283 EMERGENCY DEPT VISIT LOW MDM: CPT

## 2019-03-12 RX ORDER — AZITHROMYCIN 250 MG/1
TABLET, FILM COATED ORAL
Qty: 6 TABLET | Refills: 0 | Status: SHIPPED | OUTPATIENT
Start: 2019-03-12 | End: 2021-04-08

## 2019-03-12 RX ORDER — ACETAMINOPHEN 500 MG
1000 TABLET ORAL ONCE
Status: COMPLETED | OUTPATIENT
Start: 2019-03-12 | End: 2019-03-12

## 2019-03-12 RX ORDER — IBUPROFEN 600 MG/1
600 TABLET, FILM COATED ORAL ONCE
Status: COMPLETED | OUTPATIENT
Start: 2019-03-12 | End: 2019-03-12

## 2019-03-12 RX ADMIN — ACETAMINOPHEN 1000 MG: 500 TABLET, FILM COATED ORAL at 10:15

## 2019-03-12 RX ADMIN — IBUPROFEN 600 MG: 600 TABLET, FILM COATED ORAL at 10:15

## 2019-03-12 ASSESSMENT — ENCOUNTER SYMPTOMS
SORE THROAT: 1
NAUSEA: 1
VOMITING: 0
FEVER: 1
LIGHT-HEADEDNESS: 1
DIARRHEA: 0
DIZZINESS: 1

## 2019-03-12 NOTE — ED PROVIDER NOTES
History     Chief Complaint:  Flu Symptoms    HPI   Nicollette M Armijos is a 18 year old female who presents with a female  to the Emergency Department today for evaluation of flu symptoms. Patient reports she had a phone diagnosis of the flu three days ago. This morning she woke up with a fever of 103.3. She is dizzy and reports throat and chest pain when she breathes. She is worried about her breathing because her asthma worsens when she is sick. She came in today because she was unable to get out of bed this morning and felt as if she was going to pass out. She is nauseous but has not vomited. No diarrhea. She has been taking Tylenol and Ibuprofen every 4-6 hours and her last dose was at 4:30 AM this morning.     Allergies:  No known drug allergies    Medications:    Zoloft     Past Medical History:    Left-sided abdominal pain  Asthma  Ovarian cyst  Constipation    Past Surgical History:    Dilation and curettage suction  Insert intrauterine device    Family History:    History reviewed. No pertinent family history.     Social History:  Smoking status: Never smoker  Alcohol use: No  Marital Status:   [2]     Review of Systems   Constitutional: Positive for fever.   HENT: Positive for sore throat.    Cardiovascular: Positive for chest pain.   Gastrointestinal: Positive for nausea. Negative for diarrhea and vomiting.   Neurological: Positive for dizziness and light-headedness.   All other systems reviewed and are negative.      Physical Exam     Patient Vitals for the past 24 hrs:   BP Temp Temp src Pulse Resp SpO2   03/12/19 1011 98/63 -- -- 134 18 99 %   03/12/19 0908 103/73 102.9  F (39.4  C) Oral 129 16 99 %       Physical Exam    General: Alert, Mild  discomfort, well kept  Eyes: PERRL, conjunctivae pink no scleral icterus or conjunctival injection  ENT:   Moist mucus membranes, posterior oropharynx clear without erythema or exudates, No lymphadenopathy, Normal voice  Resp:  Occasional  coarse cough. Lungs clear to auscultation bilaterally, no crackles/rubs/wheezes. Good air movement  CV:  Tachycardic rate and normal rhythm, no murmurs/rubs/gallops  GI:  Abdomen soft and non-distended.  Normoactive BS.  No tenderness, guarding or rebound, No masses  Skin:  Warm, mildly diaphoretic.  No rashes or petechiae  Musculoskeletal: No peripheral edema or calf tenderness, Normal gross ROM   Neuro: Alert and oriented to person/place/time, normal sensation  Psychiatric: Normal affect, cooperative, good eye contact    Emergency Department Course     Imaging:  Radiographic findings were communicated with the patient who voiced understanding of the findings.  XR Chest 2 Views   IMPRESSION: Small area of mild nodular opacity in the left midlung  could represent a mild pneumonia. The lungs are otherwise clear. No  pleural effusions. Heart size and pulmonary vascularity are within  normal limits.  As read by radiology.    Interventions:  1015: Tylenol 1000 MG PO  1015: Ibuprofen 600 MG PO    Emergency Department Course:  Past medical records, nursing notes, and vitals reviewed.  1004: I performed an exam of the patient and obtained history, as documented above.    The patient was sent for a chest x-ray while in the emergency department, findings above.    1102: I rechecked the patient. Explained findings to the patient.    Findings and plan explained to the Patient. Patient discharged home with instructions regarding supportive care, medications, and reasons to return. The importance of close follow-up was reviewed.      Impression & Plan      Medical Decision Making:  Nicollette M Armijos is a 18 year old female presents for evaluation of upper respiratory symptoms. Patient is concerned for continued symptoms after dx of influenza. Evaluation consisted of CXR, physical exam; positive CXR. Exam consistent with pneumonia and flu like illness. No meningismus, pneumothorax, or pleural effusion. No evidence of sepsis.  Rx for Azitromycin given. Discharged with advice for symptomatic treatment including over the counter medication such as Tylenol and Ibuprofen. Advised to follow up with primary care provider in 5-7 days if continued symptoms, sooner if worsening. Patient will return to the ER/UR if they develop high fevers not controlled with medication, difficulty breathing, shortness of breath, or has other concerns.     Diagnosis:    ICD-10-CM   1. Pneumonia J18.9     Disposition:  discharged to home    Discharge Medications:     Medication List      Started    azithromycin 250 MG tablet  Commonly known as:  ZITHROMAX  2 tabs day one, 1 tab days 2-5          Valerie Hernandez  3/12/2019   [facility] EMERGENCY DEPARTMENT  Scribe Disclosure:  I, Valerie Hernandez, am serving as a scribe at 10:04 AM on 3/12/2019 to document services personally performed by Atilio Arita APRN based on my observations and the provider's statements to me.        Atilio Arita APRN CNP  03/12/19 1444

## 2019-03-12 NOTE — LETTER
March 12, 2019      To Whom It May Concern:      Nicollette M Armijos was seen in our Emergency Department today, 03/12/19.  I expect her condition to improve over the next 2 days.  She may return to work/school when improved.    Sincerely,        Shahnaz Sharp RN

## 2019-03-12 NOTE — ED NOTES
Pt verbalized understanding of discharge teaching and prescription. Patient denies any questions or concerns at this time.

## 2019-03-12 NOTE — ED AVS SNAPSHOT
[facility] Emergency Department  [address]  [facility] [address]  Phone:  [phone]  Fax:  [phone]                                    Nicollette M Armijos   MRN: [id number]    Department:  [facility] Emergency Department   Date of Visit:  3/12/2019           After Visit Summary Signature Page    I have received my discharge instructions, and my questions have been answered. I have discussed any challenges I see with this plan with the nurse or doctor.    ..........................................................................................................................................  Patient/Patient Representative Signature      ..........................................................................................................................................  Patient Representative Print Name and Relationship to Patient    ..................................................               ................................................  Date                                   Time    ..........................................................................................................................................  Reviewed by Signature/Title    ...................................................              ..............................................  Date                                               Time          22EPIC Rev 08/18

## 2020-10-02 DIAGNOSIS — Z11.59 ENCOUNTER FOR SCREENING FOR OTHER VIRAL DISEASES: Primary | ICD-10-CM

## 2020-10-08 ENCOUNTER — HOSPITAL ENCOUNTER (OUTPATIENT)
Dept: GENERAL RADIOLOGY | Facility: CLINIC | Age: 20
Discharge: HOME | End: 2020-10-08
Attending: OBSTETRICS & GYNECOLOGY | Admitting: OBSTETRICS & GYNECOLOGY
Payer: COMMERCIAL

## 2020-10-08 DIAGNOSIS — Z31.41 FERTILITY TESTING: ICD-10-CM

## 2020-10-08 PROCEDURE — 255N000002 HC RX 255 OP 636: Performed by: OBSTETRICS & GYNECOLOGY

## 2020-10-08 PROCEDURE — 74740 X-RAY FEMALE GENITAL TRACT: CPT

## 2020-10-08 RX ORDER — IOPAMIDOL 510 MG/ML
50 INJECTION, SOLUTION INTRAVASCULAR ONCE
Status: COMPLETED | OUTPATIENT
Start: 2020-10-08 | End: 2020-10-08

## 2020-10-08 RX ADMIN — IOPAMIDOL 10 ML: 510 INJECTION, SOLUTION INTRAVASCULAR at 11:49

## 2020-10-08 NOTE — OP NOTE
Hysterosalpingogram Operative Note    PREOPERATIVE DIAGNOSES:   1. Infertility  POSTOPERATIVE DIAGNOSES:   1. Same  PROCEDURE: Hysterosalpingogram  ESTIMATED BLOOD LOSS: 0 ml  ANESTHESIA: None  FINDINGS: Normal appearing uterine cavity, dye spilling from both fallopian tubes  COMPLICATIONS: None   INDICATIONS: Patient is a 20 year old  female currently undergoing evaluation for infertility  PROCEDURE: The patient was brought to the radiology suite and placed in the dorsal lithotomy position. Bimanual exam showed a small anteverted uterus. A sterile speculum was placed in the vagina and the cervix was cleansed with betadine. The HSG plastic catheter was inserted into the cervix and the balloon filled with air. Dye was then slowly injected into the uterine cavity under x-ray visualization and spillage was noted from bilateral fallopian tubes. The shape of the uterine cavity appeared normal.   The balloon was then deflated and the catheter removed. The tenaculum was removed from the cervix, which was hemostatic, and the speculum was removed. The patient tolerated the procedure well with moderate amount of cramping.   Ruth Hull MD

## 2021-01-27 ENCOUNTER — IMMUNIZATION (OUTPATIENT)
Dept: NURSING | Facility: CLINIC | Age: 21
End: 2021-01-27
Payer: COMMERCIAL

## 2021-01-27 PROCEDURE — 0001A PR COVID VAC PFIZER DIL RECON 30 MCG/0.3 ML IM: CPT

## 2021-01-27 PROCEDURE — 91300 PR COVID VAC PFIZER DIL RECON 30 MCG/0.3 ML IM: CPT

## 2021-02-07 ENCOUNTER — HEALTH MAINTENANCE LETTER (OUTPATIENT)
Age: 21
End: 2021-02-07

## 2021-02-17 ENCOUNTER — IMMUNIZATION (OUTPATIENT)
Dept: NURSING | Facility: CLINIC | Age: 21
End: 2021-02-17
Attending: FAMILY MEDICINE
Payer: COMMERCIAL

## 2021-02-17 PROCEDURE — 0002A PR COVID VAC PFIZER DIL RECON 30 MCG/0.3 ML IM: CPT

## 2021-02-17 PROCEDURE — 91300 PR COVID VAC PFIZER DIL RECON 30 MCG/0.3 ML IM: CPT

## 2021-03-25 ENCOUNTER — TELEPHONE (OUTPATIENT)
Dept: OBGYN | Facility: CLINIC | Age: 21
End: 2021-03-25

## 2021-03-25 NOTE — TELEPHONE ENCOUNTER
LMP 1/28.  8w today.  C/o nausea, dizziness and some constipation.  Can keep water down.   Last BM 4 days ago, small amounts.    Advised B6/Unisom.  Frequent intake of small amounts of liquid.  Small amounts of bland food.  Bibiana/Peppermint.  Can try adding metamucil or Colace.  Reviewed senna use occasionally PRN.    Will call back if sx not improving.    Tatyana Diaz RN

## 2021-03-26 ENCOUNTER — ANCILLARY PROCEDURE (OUTPATIENT)
Dept: ULTRASOUND IMAGING | Facility: CLINIC | Age: 21
End: 2021-03-26
Payer: COMMERCIAL

## 2021-03-26 ENCOUNTER — TELEPHONE (OUTPATIENT)
Dept: OBGYN | Facility: CLINIC | Age: 21
End: 2021-03-26

## 2021-03-26 DIAGNOSIS — Z34.90 SUPERVISION OF NORMAL PREGNANCY: ICD-10-CM

## 2021-03-26 DIAGNOSIS — Z34.90 SUPERVISION OF NORMAL PREGNANCY: Primary | ICD-10-CM

## 2021-03-26 LAB
ABO + RH BLD: NORMAL
ABO + RH BLD: NORMAL
ALBUMIN UR-MCNC: NEGATIVE MG/DL
APPEARANCE UR: CLEAR
BILIRUB UR QL STRIP: NEGATIVE
BLD GP AB SCN SERPL QL: NORMAL
BLOOD BANK CMNT PATIENT-IMP: NORMAL
COLOR UR AUTO: YELLOW
ERYTHROCYTE [DISTWIDTH] IN BLOOD BY AUTOMATED COUNT: 12.6 % (ref 10–15)
GLUCOSE UR STRIP-MCNC: NEGATIVE MG/DL
HBV SURFACE AG SERPL QL IA: NONREACTIVE
HCT VFR BLD AUTO: 37.2 % (ref 35–47)
HGB BLD-MCNC: 12.2 G/DL (ref 11.7–15.7)
HGB UR QL STRIP: NEGATIVE
HIV 1+2 AB+HIV1 P24 AG SERPL QL IA: NONREACTIVE
KETONES UR STRIP-MCNC: NEGATIVE MG/DL
LEUKOCYTE ESTERASE UR QL STRIP: NEGATIVE
MCH RBC QN AUTO: 31.3 PG (ref 26.5–33)
MCHC RBC AUTO-ENTMCNC: 32.8 G/DL (ref 31.5–36.5)
MCV RBC AUTO: 95 FL (ref 78–100)
NITRATE UR QL: NEGATIVE
PH UR STRIP: 6 PH (ref 5–7)
PLATELET # BLD AUTO: 241 10E9/L (ref 150–450)
RBC # BLD AUTO: 3.9 10E12/L (ref 3.8–5.2)
SOURCE: NORMAL
SP GR UR STRIP: 1.01 (ref 1–1.03)
SPECIMEN EXP DATE BLD: NORMAL
T PALLIDUM AB SER QL: NONREACTIVE
UROBILINOGEN UR STRIP-ACNC: 0.2 EU/DL (ref 0.2–1)
WBC # BLD AUTO: 11.6 10E9/L (ref 4–11)

## 2021-03-26 PROCEDURE — 81003 URINALYSIS AUTO W/O SCOPE: CPT | Performed by: ADVANCED PRACTICE MIDWIFE

## 2021-03-26 PROCEDURE — 76801 OB US < 14 WKS SINGLE FETUS: CPT | Performed by: FAMILY MEDICINE

## 2021-03-26 PROCEDURE — 85027 COMPLETE CBC AUTOMATED: CPT | Performed by: ADVANCED PRACTICE MIDWIFE

## 2021-03-26 PROCEDURE — 86900 BLOOD TYPING SEROLOGIC ABO: CPT | Performed by: ADVANCED PRACTICE MIDWIFE

## 2021-03-26 PROCEDURE — 86780 TREPONEMA PALLIDUM: CPT | Mod: 90 | Performed by: ADVANCED PRACTICE MIDWIFE

## 2021-03-26 PROCEDURE — 36415 COLL VENOUS BLD VENIPUNCTURE: CPT | Performed by: ADVANCED PRACTICE MIDWIFE

## 2021-03-26 PROCEDURE — 99000 SPECIMEN HANDLING OFFICE-LAB: CPT | Performed by: ADVANCED PRACTICE MIDWIFE

## 2021-03-26 PROCEDURE — 87340 HEPATITIS B SURFACE AG IA: CPT | Performed by: ADVANCED PRACTICE MIDWIFE

## 2021-03-26 PROCEDURE — 86901 BLOOD TYPING SEROLOGIC RH(D): CPT | Performed by: ADVANCED PRACTICE MIDWIFE

## 2021-03-26 PROCEDURE — 87086 URINE CULTURE/COLONY COUNT: CPT | Performed by: ADVANCED PRACTICE MIDWIFE

## 2021-03-26 PROCEDURE — 87389 HIV-1 AG W/HIV-1&-2 AB AG IA: CPT | Performed by: ADVANCED PRACTICE MIDWIFE

## 2021-03-26 PROCEDURE — 86762 RUBELLA ANTIBODY: CPT | Performed by: ADVANCED PRACTICE MIDWIFE

## 2021-03-26 PROCEDURE — 86850 RBC ANTIBODY SCREEN: CPT | Performed by: ADVANCED PRACTICE MIDWIFE

## 2021-03-26 NOTE — TELEPHONE ENCOUNTER
Pt calling with low pelvic/rectal pain.    Started off intermittent and on each side, but is now more constant and radiating to her rectum.    Pt is constipated, has not had BM for 5 days.  Took colace once but not relief.      8w1d by LMP.  US not scheduled for a couple weeks.  Rescheduled US for today.    Ectopic/miscarriage precautions given.  Routing to on call provider to review if anything else should be done.    Tatyana Diaz RN

## 2021-03-26 NOTE — TELEPHONE ENCOUNTER
Called to review normal early ultrasound-- may change ESTEBAN based on this ultrasound. Nicollette shares she had an unusual cycle in January so she is open to the idea of a different date. Will release other IOB labs as they result-- Nicollette is unsure of her blood type. Reviewed precautions for rh negative & bleeding in pregnancy.    Reviewed comfort measures for constipation in pregnancy-- Nicollette reports this has been an ongoing issue since she was a baby. Will work on increasing fruits & vegetables, hydration, and try OTC medications in a stepwise approach.     Encouraged to call nurse line at any time with questions or concerns, or office visit next week if constipation does not resolve. No further questions or concerns today.    Shalonda Lee, ARIAN, CNM

## 2021-03-27 ENCOUNTER — HOSPITAL ENCOUNTER (EMERGENCY)
Facility: CLINIC | Age: 21
Discharge: HOME | End: 2021-03-27
Attending: EMERGENCY MEDICINE | Admitting: EMERGENCY MEDICINE
Payer: COMMERCIAL

## 2021-03-27 ENCOUNTER — NURSE TRIAGE (OUTPATIENT)
Dept: NURSING | Facility: CLINIC | Age: 21
End: 2021-03-27

## 2021-03-27 VITALS
HEART RATE: 102 BPM | DIASTOLIC BLOOD PRESSURE: 81 MMHG | TEMPERATURE: 97.1 F | RESPIRATION RATE: 16 BRPM | SYSTOLIC BLOOD PRESSURE: 117 MMHG | OXYGEN SATURATION: 99 %

## 2021-03-27 DIAGNOSIS — K59.01 SLOW TRANSIT CONSTIPATION: ICD-10-CM

## 2021-03-27 DIAGNOSIS — O21.9 NAUSEA AND VOMITING IN PREGNANCY: ICD-10-CM

## 2021-03-27 LAB
ALBUMIN SERPL-MCNC: 3.6 G/DL (ref 3.4–5)
ALBUMIN UR-MCNC: 20 MG/DL
ALP SERPL-CCNC: 57 U/L (ref 40–150)
ALT SERPL W P-5'-P-CCNC: 16 U/L (ref 0–50)
ANION GAP SERPL CALCULATED.3IONS-SCNC: 12 MMOL/L (ref 3–14)
APPEARANCE UR: CLEAR
AST SERPL W P-5'-P-CCNC: 7 U/L (ref 0–45)
BACTERIA #/AREA URNS HPF: ABNORMAL /HPF
BACTERIA SPEC CULT: NO GROWTH
BASOPHILS # BLD AUTO: 0 10E9/L (ref 0–0.2)
BASOPHILS NFR BLD AUTO: 0.1 %
BILIRUB SERPL-MCNC: 0.5 MG/DL (ref 0.2–1.3)
BILIRUB UR QL STRIP: NEGATIVE
BUN SERPL-MCNC: 6 MG/DL (ref 7–30)
CALCIUM SERPL-MCNC: 8.8 MG/DL (ref 8.5–10.1)
CHLORIDE SERPL-SCNC: 105 MMOL/L (ref 94–109)
CO2 SERPL-SCNC: 23 MMOL/L (ref 20–32)
COLOR UR AUTO: YELLOW
CREAT SERPL-MCNC: 0.65 MG/DL (ref 0.52–1.04)
DIFFERENTIAL METHOD BLD: ABNORMAL
EOSINOPHIL # BLD AUTO: 0 10E9/L (ref 0–0.7)
EOSINOPHIL NFR BLD AUTO: 0.1 %
ERYTHROCYTE [DISTWIDTH] IN BLOOD BY AUTOMATED COUNT: 12.4 % (ref 10–15)
GFR SERPL CREATININE-BSD FRML MDRD: >90 ML/MIN/{1.73_M2}
GLUCOSE SERPL-MCNC: 81 MG/DL (ref 70–99)
GLUCOSE UR STRIP-MCNC: NEGATIVE MG/DL
HCT VFR BLD AUTO: 37.1 % (ref 35–47)
HGB BLD-MCNC: 12.9 G/DL (ref 11.7–15.7)
HGB UR QL STRIP: NEGATIVE
HYALINE CASTS #/AREA URNS LPF: 1 /LPF (ref 0–2)
IMM GRANULOCYTES # BLD: 0 10E9/L (ref 0–0.4)
IMM GRANULOCYTES NFR BLD: 0.3 %
KETONES UR STRIP-MCNC: >150 MG/DL
LEUKOCYTE ESTERASE UR QL STRIP: NEGATIVE
LYMPHOCYTES # BLD AUTO: 2.6 10E9/L (ref 0.8–5.3)
LYMPHOCYTES NFR BLD AUTO: 22.1 %
Lab: NORMAL
MCH RBC QN AUTO: 32.1 PG (ref 26.5–33)
MCHC RBC AUTO-ENTMCNC: 34.8 G/DL (ref 31.5–36.5)
MCV RBC AUTO: 92 FL (ref 78–100)
MONOCYTES # BLD AUTO: 0.6 10E9/L (ref 0–1.3)
MONOCYTES NFR BLD AUTO: 5.1 %
MUCOUS THREADS #/AREA URNS LPF: PRESENT /LPF
NEUTROPHILS # BLD AUTO: 8.6 10E9/L (ref 1.6–8.3)
NEUTROPHILS NFR BLD AUTO: 72.3 %
NITRATE UR QL: NEGATIVE
NRBC # BLD AUTO: 0 10*3/UL
NRBC BLD AUTO-RTO: 0 /100
PH UR STRIP: 6 PH (ref 5–7)
PLATELET # BLD AUTO: 228 10E9/L (ref 150–450)
POTASSIUM SERPL-SCNC: 3.4 MMOL/L (ref 3.4–5.3)
PROT SERPL-MCNC: 6.8 G/DL (ref 6.8–8.8)
RBC # BLD AUTO: 4.02 10E12/L (ref 3.8–5.2)
RBC #/AREA URNS AUTO: 1 /HPF (ref 0–2)
SODIUM SERPL-SCNC: 140 MMOL/L (ref 133–144)
SOURCE: ABNORMAL
SP GR UR STRIP: 1.03 (ref 1–1.03)
SPECIMEN SOURCE: NORMAL
SQUAMOUS #/AREA URNS AUTO: 4 /HPF (ref 0–1)
UROBILINOGEN UR STRIP-MCNC: NORMAL MG/DL (ref 0–2)
WBC # BLD AUTO: 11.9 10E9/L (ref 4–11)
WBC #/AREA URNS AUTO: 1 /HPF (ref 0–5)

## 2021-03-27 PROCEDURE — 250N000011 HC RX IP 250 OP 636: Performed by: EMERGENCY MEDICINE

## 2021-03-27 PROCEDURE — 80053 COMPREHEN METABOLIC PANEL: CPT | Performed by: EMERGENCY MEDICINE

## 2021-03-27 PROCEDURE — 96361 HYDRATE IV INFUSION ADD-ON: CPT

## 2021-03-27 PROCEDURE — 96374 THER/PROPH/DIAG INJ IV PUSH: CPT

## 2021-03-27 PROCEDURE — 85025 COMPLETE CBC W/AUTO DIFF WBC: CPT | Performed by: EMERGENCY MEDICINE

## 2021-03-27 PROCEDURE — 81001 URINALYSIS AUTO W/SCOPE: CPT | Performed by: EMERGENCY MEDICINE

## 2021-03-27 PROCEDURE — 96375 TX/PRO/DX INJ NEW DRUG ADDON: CPT

## 2021-03-27 PROCEDURE — 99284 EMERGENCY DEPT VISIT MOD MDM: CPT | Mod: 25

## 2021-03-27 PROCEDURE — 258N000003 HC RX IP 258 OP 636: Performed by: EMERGENCY MEDICINE

## 2021-03-27 RX ORDER — METOCLOPRAMIDE 10 MG/1
10 TABLET ORAL
Qty: 15 TABLET | Refills: 0 | Status: SHIPPED | OUTPATIENT
Start: 2021-03-27 | End: 2021-03-31

## 2021-03-27 RX ORDER — DIPHENHYDRAMINE HYDROCHLORIDE 50 MG/ML
25 INJECTION INTRAMUSCULAR; INTRAVENOUS ONCE
Status: COMPLETED | OUTPATIENT
Start: 2021-03-27 | End: 2021-03-27

## 2021-03-27 RX ORDER — SODIUM CHLORIDE 9 MG/ML
INJECTION, SOLUTION INTRAVENOUS CONTINUOUS
Status: DISCONTINUED | OUTPATIENT
Start: 2021-03-27 | End: 2021-03-28 | Stop reason: HOSPADM

## 2021-03-27 RX ORDER — METOCLOPRAMIDE HYDROCHLORIDE 5 MG/ML
10 INJECTION INTRAMUSCULAR; INTRAVENOUS ONCE
Status: COMPLETED | OUTPATIENT
Start: 2021-03-27 | End: 2021-03-27

## 2021-03-27 RX ADMIN — SODIUM CHLORIDE 1000 ML: 9 INJECTION, SOLUTION INTRAVENOUS at 21:05

## 2021-03-27 RX ADMIN — METOCLOPRAMIDE HYDROCHLORIDE 10 MG: 5 INJECTION INTRAMUSCULAR; INTRAVENOUS at 21:04

## 2021-03-27 RX ADMIN — DIPHENHYDRAMINE HYDROCHLORIDE 25 MG: 50 INJECTION, SOLUTION INTRAMUSCULAR; INTRAVENOUS at 21:03

## 2021-03-27 ASSESSMENT — ENCOUNTER SYMPTOMS
DIFFICULTY URINATING: 0
VOMITING: 1
HEMATURIA: 0
NAUSEA: 1
CONSTIPATION: 1
DYSURIA: 0

## 2021-03-27 NOTE — TELEPHONE ENCOUNTER
Triage Call:    Patient calling as she has been in bed since 1700 yesterday.  Woke with very bad nausea, unable to keep water down.  If she eats, she is in excruciating pain, including with prenatal vitamins.  She also reports being very dizzy and can barely stand up.  Vomited x4 in the last 24 hours, mostly is dry heaving, as she can't keep anything down.  Has been using the Unisome and B6 and it was helping before, but is not helping today.  Patient has tried taking the metamucil, but throws it up every time.   She has also tried prune juice, colace, miralax and another medication to help with the constipation, but nothing is helping.    Pt was advised of protocol recommendation/disposition of ED.     Mady Hassan RN on 3/27/2021 at 5:21 PM        COVID 19 Nurse Triage Plan/Patient Instructions    Please be aware that novel coronavirus (COVID-19) may be circulating in the community. If you develop symptoms such as fever, cough, or SOB or if you have concerns about the presence of another infection including coronavirus (COVID-19), please contact your health care provider or visit [email or web address].     Disposition/Instructions    ED Visit recommended. Follow protocol based instructions.     Bring Your Own Device:  Please also bring your smart device(s) (smart phones, tablets, laptops) and their charging cables for your personal use and to communicate with your care team during your visit.    Thank you for taking steps to prevent the spread of this virus.  o Limit your contact with others.  o Wear a simple mask to cover your cough.  o Wash your hands well and often.    Resources    [facility]: About COVID-19: [email or web address]    CDC: What to Do If You're Sick: www.cdc.gov/coronavirus/2019-ncov/about/steps-when-sick.html    CDC: Ending Home Isolation: www.cdc.gov/coronavirus/2019-ncov/hcp/disposition-in-home-patients.html     CDC: Caring for Someone:  "www.cdc.gov/coronavirus/2019-ncov/if-you-are-sick/care-for-someone.html     [facility]: Interim Guidance for Hospital Discharge to Home: www.health.[facility].[place]/diseases/coronavirus/hcp/hospdischarge.pdf    [facility] clinical trials (COVID-19 research studies): clinicalaffairs.[facility]/umn-clinical-trials     Below are the COVID-19 hotlines at the [place] Department of Health ([facility]). Interpreters are available.   o For health questions: Call 240-311-4970 or 1-405.990.4163 (7 a.m. to 7 p.m.)  o For questions about schools and childcare: Call 650-124-2826 or 1-579.709.5644 (7 a.m. to 7 p.m.)                   Reason for Disposition    [1] Drinking very little AND [2] dehydration suspected (e.g., no urine > 12 hours, very dry mouth, very lightheaded)    Additional Information    Negative: Sounds like a life-threatening emergency to the triager    Negative: [1] Vaginal bleeding AND [2] pregnant < 20 weeks    Negative: [1] Abdominal pain AND [2] pregnant < 20 weeks    Negative: Headache is the main complaint    Negative: [1] Vomiting AND [2] contains red blood or black (\"coffee ground\") material  (Exception: few red streaks in vomit that only happened once)    Negative: [1] Insulin-dependent diabetes (Type I) AND [2] glucose > 400 mg/dl (22 mmol/l)    Negative: Recent head injury (within last 3 days)    Negative: Recent abdominal injury (within last 3 days)    Negative: Severe pain in one eye    Negative: [1] SEVERE vomiting (e.g., 8 or more times / day) AND [2] present > 8 hours    Protocols used: PREGNANCY - MORNING SICKNESS (NAUSEA AND VOMITING OF PREGNANCY)-A-AH      "

## 2021-03-27 NOTE — ED TRIAGE NOTES
Patient presents to the ED reporting nausea and vomiting. Patient is 9 weeks pregnant. Additionally reports troubles with constipation. Estimates it has been approximately 1 week since she had a bowel movement.

## 2021-03-28 NOTE — ED NOTES
Patient refusing enema, patient educated on reasons enema was ordered.  Patient states she understands and still does not want it.  MD notified.

## 2021-03-28 NOTE — ED PROVIDER NOTES
History   Chief Complaint:  Vomiting       HPI   Nicollette M Armijos is a 9 weeks pregnant  20 year old female with history of anxiety and depression who presents with nausea, vomiting, and constipation. The patient says that she has been constipated for the past week. She says that she can feel stool is there, but is unable to push. The patient says that she has been constipated in the past, but nothing to this extent. The patient says that she has been nauseous for the past month, but since earlier today she has been unable to tolerate and foods or fluids. She denies any urinary issues.       Review of Systems   Gastrointestinal: Positive for constipation, nausea and vomiting.   Genitourinary: Negative for difficulty urinating, dysuria and hematuria.   All other systems reviewed and are negative.      Allergies:  No Known Drug Allergies     Medications:  Zithromax  Zoloft  Norco  Buspar     Past Medical History:    Asthma  Ovarian cyst    Anxiety    Depression     Past Surgical History:    Sigmoidoscopy flexible  Insert IUD  EGD  Dilation and curettage suction     Dental surgery     Family History:    Heart disease  Hypertension   Lupus   Raynaud's   Chiari-1 malformation    Social History:  Patient presents to the ED with her significant other.     Physical Exam     Patient Vitals for the past 24 hrs:   BP Temp Pulse Resp SpO2   21 1756 117/81 97.1  F (36.2  C) 102 16 99 %       Physical Exam  GEN: alert    HEAD: atraumatic    EYES: pupils reactive, extraocular muscles intact, conjunctivae normal    ENT: Dry oral mucosa., oral pharynx clear; nose clear    NECK: Normal ROM, trachea midline    RESPIRATORY: no tachypnea, normal work of breathing, breath sounds clear to auscultation    CVS: normal S1/S2, no murmurs/rubs/gallops    ABDOMEN: soft, nontender, no masses or organomegaly, no rebound.  Increased bowel sounds consistent with stool throughout. No abdominal tenderness.   Rectal exam:  Moderate  amount of vicky stool about 4 cm up.    MUSCULOSKELETAL: no deformities    SKIN: warm and dry, no acute rashes or ulceration, no erythema     NEURO: GCS 15, cranial nerves intact.  Motor and sensory- no focal deficits.     LYMPH: no lymphadenopathy      Emergency Department Course     Laboratory:    CBC: WBC 11.9 (H), HGB 12.9,   CMP: BUN 6 (L) o/w WNL (Creatinine 0.65)    UA with micro: ketone >150 (A), protein albumin 20 (A), bacteria few (A), squamous epithelial 4 (H), mucous present (A) o/w negative     Procedures    Emergency Department Course:    Reviewed:  1950 I reviewed the patient's nursing notes, vitals, past medical records, Care Everywhere.        Assessments:  2016 I performed an exam of the patient as documented above.   2127 Patient rechecked and updated.      Interventions:  2103 Benadryl 25 mg IV   Reglan 10 mg IB   NS 1 L IV    Disposition:  The patient was discharged to home.       Impression & Plan     Medical Decision Making:  Nicollette M Armijos is a 20 year old female who presents to the emergency department today for evaluation of nausea vomiting.  She is at 9 weeks gestational age by dates and is seen in OB already.  This is her third pregnancy but she has had 2 previous miscarriages.  She has had nausea for the past 4 weeks but developed vomiting today.  She reports constipation of 1 weeks duration and normally has a bowel movement every 3 days.  Examination reveals impacted stool in the rectal vault.  I felt that she would respond well to an enema and we discussed this but she refused it several times.  I thought it may be contributing to her vomiting and morning sickness and told her to be in the baby's best interest if we evacuated her now and started her on antiemetics.  She still refused the enema here and wants to do fleets enemas at home.  I have recommended that she  2 fleets enemas and use them tonight.  She was given a liter of IV crystalloid here and had no more  nausea after Reglan and tolerated that well.  She will go home on oral Reglan 10 mg every 6 hours as needed for nausea.  She is should start taking Colace and continue using her Metamucil with increased fluids.  If she gets to the point where she cannot drink fluids due to vomiting she should return here.  She should follow-up with her PCP otherwise on Monday for reevaluation.        Diagnosis:    ICD-10-CM    1. Nausea and vomiting in pregnancy  O21.9    2. Slow transit constipation  K59.01        Discharge Medications:  New Prescriptions    METOCLOPRAMIDE (REGLAN) 10 MG TABLET    Take 1 tablet (10 mg) by mouth 4 times daily (before meals and nightly) for 15 doses       Scribe Disclosure:  I, Jose Guadalupe Fisher, am serving as a scribe at 7:50 PM on 3/27/2021 to document services personally performed by Tom Jones MD based on my observations and the provider's statements to me.          Tom Jones MD  03/27/21 2051

## 2021-03-28 NOTE — DISCHARGE INSTRUCTIONS
Use Fleets enema; may repeat X 1 if no result tonight or little result.  Continue Colace and Metamucil tonight with increased fluids.  Start Reglan as directed for nausea and vomiting.  Return if relapse or no improvement with constipation, otherwise follow up with PCP Monday

## 2021-03-29 LAB — RUBV IGG SERPL IA-ACNC: 19 IU/ML

## 2021-04-08 ENCOUNTER — APPOINTMENT (OUTPATIENT)
Dept: URBAN - METROPOLITAN AREA CLINIC 253 | Age: 21
Setting detail: DERMATOLOGY
End: 2021-04-08

## 2021-04-08 ENCOUNTER — PRENATAL OFFICE VISIT (OUTPATIENT)
Dept: NURSING | Facility: CLINIC | Age: 21
End: 2021-04-08
Payer: COMMERCIAL

## 2021-04-08 VITALS — HEIGHT: 66 IN | RESPIRATION RATE: 15 BRPM | WEIGHT: 125 LBS

## 2021-04-08 DIAGNOSIS — Z71.89 OTHER SPECIFIED COUNSELING: ICD-10-CM

## 2021-04-08 DIAGNOSIS — D22 MELANOCYTIC NEVI: ICD-10-CM

## 2021-04-08 DIAGNOSIS — Z34.90 SUPERVISION OF NORMAL PREGNANCY: Primary | ICD-10-CM

## 2021-04-08 PROBLEM — D48.5 NEOPLASM OF UNCERTAIN BEHAVIOR OF SKIN: Status: ACTIVE | Noted: 2021-04-08

## 2021-04-08 PROBLEM — D22.39 MELANOCYTIC NEVI OF OTHER PARTS OF FACE: Status: ACTIVE | Noted: 2021-04-08

## 2021-04-08 PROCEDURE — OTHER BIOPSY BY SHAVE METHOD: OTHER

## 2021-04-08 PROCEDURE — OTHER SUNSCREEN RECOMMENDATIONS: OTHER

## 2021-04-08 PROCEDURE — OTHER COUNSELING: OTHER

## 2021-04-08 PROCEDURE — 99202 OFFICE O/P NEW SF 15 MIN: CPT | Mod: 25

## 2021-04-08 PROCEDURE — 99207 PR NO CHARGE NURSE ONLY: CPT

## 2021-04-08 PROCEDURE — 11102 TANGNTL BX SKIN SINGLE LES: CPT

## 2021-04-08 RX ORDER — PNV NO.95/FERROUS FUM/FOLIC AC 28MG-0.8MG
1 TABLET ORAL DAILY
COMMUNITY
End: 2023-11-01

## 2021-04-08 RX ORDER — METOCLOPRAMIDE 10 MG/1
10 TABLET ORAL
COMMUNITY
End: 2021-04-19

## 2021-04-08 RX ORDER — DOCUSATE SODIUM 100 MG/1
100 CAPSULE, LIQUID FILLED ORAL DAILY
COMMUNITY
End: 2023-11-06

## 2021-04-08 SDOH — ECONOMIC STABILITY: FOOD INSECURITY: WITHIN THE PAST 12 MONTHS, THE FOOD YOU BOUGHT JUST DIDN'T LAST AND YOU DIDN'T HAVE MONEY TO GET MORE.: NOT ASKED

## 2021-04-08 SDOH — ECONOMIC STABILITY: TRANSPORTATION INSECURITY
IN THE PAST 12 MONTHS, HAS LACK OF TRANSPORTATION KEPT YOU FROM MEETINGS, WORK, OR FROM GETTING THINGS NEEDED FOR DAILY LIVING?: NOT ASKED

## 2021-04-08 SDOH — ECONOMIC STABILITY: TRANSPORTATION INSECURITY
IN THE PAST 12 MONTHS, HAS THE LACK OF TRANSPORTATION KEPT YOU FROM MEDICAL APPOINTMENTS OR FROM GETTING MEDICATIONS?: NOT ASKED

## 2021-04-08 SDOH — ECONOMIC STABILITY: FOOD INSECURITY: WITHIN THE PAST 12 MONTHS, YOU WORRIED THAT YOUR FOOD WOULD RUN OUT BEFORE YOU GOT MONEY TO BUY MORE.: NOT ASKED

## 2021-04-08 SDOH — ECONOMIC STABILITY: INCOME INSECURITY: HOW HARD IS IT FOR YOU TO PAY FOR THE VERY BASICS LIKE FOOD, HOUSING, MEDICAL CARE, AND HEATING?: NOT ASKED

## 2021-04-08 ASSESSMENT — LOCATION SIMPLE DESCRIPTION DERM: LOCATION SIMPLE: LEFT CHEEK

## 2021-04-08 ASSESSMENT — LOCATION DETAILED DESCRIPTION DERM
LOCATION DETAILED: LEFT SUPERIOR CENTRAL BUCCAL CHEEK
LOCATION DETAILED: LEFT INFERIOR CENTRAL MALAR CHEEK

## 2021-04-08 ASSESSMENT — LOCATION ZONE DERM: LOCATION ZONE: FACE

## 2021-04-08 NOTE — PROCEDURE: SUNSCREEN RECOMMENDATIONS
Detail Level: Detailed
Products Recommended: Cerave AM.
General Sunscreen Counseling: I recommended a broad spectrum sunscreen with a SPF of 30 or higher.  I explained that SPF 30 sunscreens block approximately 97 percent of the sun's harmful rays.  Sunscreens should be applied at least 15 minutes prior to expected sun exposure and then every 2 hours after that as long as sun exposure continues. If swimming or exercising sunscreen should be reapplied every 45 minutes to an hour after getting wet or sweating.  One ounce, or the equivalent of a shot glass full of sunscreen, is adequate to protect the skin not covered by a bathing suit. I also recommended a lip balm with a sunscreen as well. Sun protective clothing can be used in lieu of sunscreen but must be worn the entire time you are exposed to the sun's rays.

## 2021-04-08 NOTE — HPI: SKIN LESIONS
Additional History: With her current pregnancy, it grew back. She is 11 wks. Her previous pregnancy, she had a miscarriage at 6 1/2 wks.

## 2021-04-08 NOTE — NURSING NOTE
NPN nurse visit done over the phone. Pt will be given NPN folder and book at her upcoming appt.   Discussed optional screening available to assess chromosomal anomalies. Questions answered. Pt advised to call the clinic if she has any questions or concerns related to her pregnancy. Prenatal labs will be obtained at her upcoming appt. New prenatal visit scheduled on 4/19 with Petra.    10w6d    Last pap 11/2020 WNL. Done at [facility]        Patient supplied answers from flow sheet for:  Prenatal OB Questionnaire.  Past Medical History  Have you ever recieved care for your mental health? : (!) Yes(depression/anxiety)  Have you ever been in a major accident or suffered serious trauma?: (P) No  Within the last year, has anyone hit, slapped, kicked or otherwise hurt you?: (P) No  In the last year, has anyone forced you to have sex when you didn't want to?: (P) No    Past Medical History 2   Have you ever received a blood transfusion?: (P) No  Would you accept a blood transfusion if was medically recommended?: (P) Yes  Does anyone in your home smoke?: (P) No   Is your blood type Rh negative?: (P) No  Have you ever ?: (P) No  Have you been hospitalized for a nonsurgical reason excluding normal delivery?: (P) No  Have you ever had an abnormal pap smear?: (P) No    Past Medical History (Continued)  Do you have a history of abnormalities of the uterus?: No  Did your mother take RAVI or any other hormones when she was pregnant with you?: (P) No  Do you have any other problems we have not asked about which you feel may be important to this pregnancy?: No         Araceli BERNABE RN

## 2021-04-12 ENCOUNTER — ANCILLARY PROCEDURE (OUTPATIENT)
Dept: ULTRASOUND IMAGING | Facility: CLINIC | Age: 21
End: 2021-04-12
Payer: COMMERCIAL

## 2021-04-12 DIAGNOSIS — Z34.90 SUPERVISION OF NORMAL PREGNANCY: ICD-10-CM

## 2021-04-12 PROCEDURE — 76801 OB US < 14 WKS SINGLE FETUS: CPT | Performed by: OBSTETRICS & GYNECOLOGY

## 2021-04-19 ENCOUNTER — PRENATAL OFFICE VISIT (OUTPATIENT)
Dept: OBGYN | Facility: CLINIC | Age: 21
End: 2021-04-19
Payer: COMMERCIAL

## 2021-04-19 VITALS
WEIGHT: 122.6 LBS | HEIGHT: 66 IN | DIASTOLIC BLOOD PRESSURE: 70 MMHG | BODY MASS INDEX: 19.7 KG/M2 | SYSTOLIC BLOOD PRESSURE: 100 MMHG

## 2021-04-19 DIAGNOSIS — Z83.3 FAMILY HISTORY OF DIABETES MELLITUS: ICD-10-CM

## 2021-04-19 DIAGNOSIS — Z34.82 ENCOUNTER FOR SUPERVISION OF OTHER NORMAL PREGNANCY IN SECOND TRIMESTER: Primary | ICD-10-CM

## 2021-04-19 DIAGNOSIS — O99.340 ANXIETY DURING PREGNANCY: ICD-10-CM

## 2021-04-19 DIAGNOSIS — F41.9 ANXIETY DURING PREGNANCY: ICD-10-CM

## 2021-04-19 PROBLEM — F41.1 GAD (GENERALIZED ANXIETY DISORDER): Status: ACTIVE | Noted: 2017-11-04

## 2021-04-19 PROCEDURE — 99207 PR FIRST OB VISIT: CPT | Performed by: ADVANCED PRACTICE MIDWIFE

## 2021-04-19 ASSESSMENT — MIFFLIN-ST. JEOR: SCORE: 1332.86

## 2021-04-19 NOTE — NURSING NOTE
"Chief Complaint   Patient presents with     Prenatal Care     12 weeks and 3 days       Initial /70 (BP Location: Left arm, Cuff Size: Adult Regular)   Ht 1.676 m (5' 6\")   Wt 55.6 kg (122 lb 9.6 oz)   LMP 2021 (Approximate)   BMI 19.79 kg/m   Estimated body mass index is 19.79 kg/m  as calculated from the following:    Height as of this encounter: 1.676 m (5' 6\").    Weight as of this encounter: 55.6 kg (122 lb 9.6 oz).  BP completed using cuff size: regular    Questioned patient about current smoking habits.  Pt. has never smoked.          The following HM Due: NONE      Himanshu Nugent MA    "

## 2021-04-19 NOTE — PROGRESS NOTES
Nicollette M Armijos is a 20 year old   woman,  who is not a previous CNM patient. She presents for a new OB Visit. This was a planned pregnancy.     FOB is Faustino who is in good health.  TC IS actively involved in relationship and this pregnancy.    Was using Clomid for ovulation induction but conceived this pregnancy without Clomid.      She has not had bleeding since her LMP.    She denies abdominal pain since her LMP.  She has had nausea.  has not had vomiting.  Any personal or family history of blood clots? No  History of sickle cell anemia or trait? No         Patient's last menstrual period was 2021 (approximate)..  Estimated Date of Delivery: Oct 26, 2021 Ultrasound used for dating d/t unsure LMP per  US    MENSTRUAL HISTORY    irregular   Last PAP:  2020  History of abnormal Pap?  No            Current medications are:    Current Outpatient Medications:      docusate sodium (COLACE) 100 MG capsule, Take 100 mg by mouth daily, Disp: , Rfl:      Prenatal Vit-Fe Fumarate-FA (PRENATAL VITAMIN) 27-0.8 MG TABS, , Disp: , Rfl:      INFECTION HISTORY  HIV: No  Hepatitis B: No  Hepatitis C: No  Tuberculosis: No    Genital Herpes self: no  Herpes partner:  no  Chlamydia:  no  Gonorrhea:  no  HPV: No  BV:  No  Syphilis:  No  Chicken Pox:  No      OB HISTORY  OB History    Para Term  AB Living   3 0 0 0 2 0   SAB TAB Ectopic Multiple Live Births   1 0 0 0 0      # Outcome Date GA Lbr Isidro/2nd Weight Sex Delivery Anes PTL Lv   3 Current            2 SAB  4w0d          1 AB  6w0d    AB, COMPLETE          History of GDM: No,  PTL : No,  History of HTN in pregnancy: No,  Thrombocytopenia: No,  Shoulder dystocia: No,  Vacuum Extraction: No  PPH: No   3rd of 4th degree laceration: No.   Other complications: No    PERSONAL HISTORY  Exercise Habits:  none   Employment: in school for dental assisting, working part time. Will graduate before baby comes.   Travel plans:   are none planned.   Diet: eats at irregular times  Prenatal vitamins? Yes:   Fish Oil? No  Abuse concerns? No  Any history if abuse, varbal, physical, sexual? No  Hgb A1c screen:  BMI > 30: No, 1st degree family DM: Yes, History of GDM: No, PCOS: No, High risk ethnicity: No    Social History     Socioeconomic History     Marital status:      Spouse name: Taoism     Number of children: Not on file     Years of education: Not on file     Highest education level: Not on file   Occupational History     Not on file   Social Needs     Financial resource strain: Not on file     Food insecurity     Worry: Not on file     Inability: Not on file     Transportation needs     Medical: Not on file     Non-medical: Not on file   Tobacco Use     Smoking status: Never Smoker     Smokeless tobacco: Never Used   Substance and Sexual Activity     Alcohol use: No     Drug use: No     Sexual activity: Yes     Partners: Male   Lifestyle     Physical activity     Days per week: Not on file     Minutes per session: Not on file     Stress: Not on file   Relationships     Social connections     Talks on phone: Not on file     Gets together: Not on file     Attends Taoism service: Not on file     Active member of club or organization: Not on file     Attends meetings of clubs or organizations: Not on file     Relationship status: Not on file     Intimate partner violence     Fear of current or ex partner: Not on file     Emotionally abused: Not on file     Physically abused: Not on file     Forced sexual activity: Not on file   Other Topics Concern     Not on file   Social History Narrative     Not on file         She  reports that she has never smoked. She has never used smokeless tobacco.    STD testing offered?  Accepted  Last PHQ-9 score on record = No flowsheet data found.  Last GAD7 score on record = No flowsheet data found.  Alcohol Score = 0  Referral/Meds needed? yes    PAST MEDICAL/SURGICAL HISTORY  Past Medical  History:   Diagnosis Date     Asthma      Constipation      Ovarian cyst, left      Urinary tract infection      Varicella     vaccinated     Past Surgical History:   Procedure Laterality Date     DILATION AND CURETTAGE SUCTION N/A 09/25/2014    Procedure: DILATION AND CURETTAGE SUCTION;  Surgeon: Petra Chase MD;  Location: [facility] OR     ESOPHAGOSCOPY, GASTROSCOPY, DUODENOSCOPY (EGD), COMBINED  11/14/2013    Procedure: COMBINED ESOPHAGOSCOPY, GASTROSCOPY, DUODENOSCOPY (EGD);  COLONOSCOPY & ESOPHAGOSCOPY, GASTROSCOPY, DUODENOSCOPY (EGD);  Surgeon: Esthela Regalado MD;  Location: [facility] OR     INSERT INTRAUTERINE DEVICE N/A 09/25/2014    Procedure: INSERT INTRAUTERINE DEVICE;  Surgeon: Petra Chase MD;  Location: [facility] OR     SIGMOIDOSCOPY FLEXIBLE  11/14/2013    Procedure: SIGMOIDOSCOPY FLEXIBLE;;  Surgeon: Esthela Regalado MD;  Location: [facility] OR     TONSILLECTOMY  2017       FAMILY HISTORY  Family History   Problem Relation Age of Onset     No Known Problems Mother      Heart Disease Father      Depression Father      No Known Problems Brother      Ovarian Cancer Maternal Grandmother      Colon Cancer Maternal Grandmother      No Known Problems Paternal Grandmother      Diabetes Paternal Grandfather      Heart Disease Paternal Grandfather      Diabetes Brother          ROS:  CONSTITUTIONAL: NEGATIVE for fever, chills, change in weight  INTEGUMENTARU/SKIN: NEGATIVE for worrisome rashes, moles or lesions  EYES: NEGATIVE for vision changes or irritation  ENT: NEGATIVE for ear, mouth and throat problems  RESP: NEGATIVE for significant cough or SOB  BREAST: NEGATIVE for masses, tenderness or discharge  CV: NEGATIVE for chest pain, palpitations or peripheral edema  GI: NEGATIVE for nausea, abdominal pain, heartburn, or change in bowel habits  : NEGATIVE for unusual urinary or vaginal symptoms.  MUSCULOSKELETAL: NEGATIVE for significant arthralgias or myalgia  NEURO: NEGATIVE for weakness, dizziness or  "paresthesias  PSYCHIATRIC: positive for anxiety    PHYSICAL EXAM  Vitals: /70 (BP Location: Left arm, Cuff Size: Adult Regular)   Ht 1.676 m (5' 6\")   Wt 55.6 kg (122 lb 9.6 oz)   LMP 2021 (Approximate)   BMI 19.79 kg/m    BMI= Body mass index is 19.79 kg/m .     GENERAL:  20 year old pleasant pregnant female, alert, cooperative and well groomed.  NECK:  Thyroid without enlargement and nodules.  Lymph nodes not palpable.   LUNGS:  Clear to auscultation.  BREAST:  Symmetrical without lesions or nodes.  Nipples everted.  Areolas symmetric.  No palpable axillary nodes.  HEART:  RRR without murmur.  ABDOMEN: Soft without masses or tenderness.  No scars noted..  LOWER EXTREMITIES: No edema. No significant varicosities.    ASSESSMENT/PLAN:    IUP at 12w3d  No diagnosis found.   consult for US for AMA patients: NA  Genetic Testing reviewed and discussed, patient desires to think about her options. Handout provided    1. (Z34.82) Encounter for supervision of other normal pregnancy in second trimester  (primary encounter diagnosis)  - Prophylactic baby ASA not indicated    2. (Z83.3) Family history of diabetes mellitus  Plan: A1C FUTURE anytime      3. (O99.340,  F41.9) Anxiety during pregnancy  Plan: MENTAL HEALTH REFERRAL  - Adult; Psychiatry;         Psychiatry and Psychotherapy         (Individual/Couple/Family Therapy); Other:         [facility]         [phone]; We will contact you to schedule        the appointment or please call with any         questi...  Discontinued Zoloft approx 6-7 before pregnancy due to her symptoms being stable  BH referral placed at [facility] [x]  Open to meds later in pregnancy if needed        COUNSELING    Instructed on use of triage nurse line and contacting the on call CNM after hours in an emergency.     Symptoms of N&V and fatigue usually start to resolve around 12-16 weeks     Reviewed CNM philosophy, call schedule for labor and " delivery, and [facility] for delivery    1st OB handout given outlining appointment spacing and CNM information    Reviewed exercise and nutrition    Recommend to gain 25-35 pounds with her pregnancy.    Discussed OTC medications. OB med list given    Encouraged patient to take PNV's/DHA    Travel precautions discussed, no air travel after 36 weeks and Zika Virus discussed    Prenatal labs reviewed      F/U to be addressed next visit:  Genetic testing, pap/chlamydia and gonorrhea done 11/2020 and records requested, make sure received     Will return to the clinic in 4 weeks for her next routine prenatal check.  Will call to be seen sooner if problems arise.    ARIAN Bal, CNM

## 2021-04-19 NOTE — PATIENT INSTRUCTIONS
Thank you for coming to see the Midwives at the   [facility]!      We will notify you about your labs that were drawn today once we get the results back or if you have [employer] they will be posted there as well      We will call you personally with results that require further discussion      If any referrals were ordered today you should be getting a call in the next week or you may need to call the number listed with your referral to schedule.            If you need any refills of medications please call your pharmacy and they will contact us      If you have any questions or concerns before your next visit, you can reach the nurse midwife on call by calling our pager number [phone].      If you  wish to schedule another appointment, please call our office at [phone]. You can also make appointments through [employer]        If you have a medical emergency please call [phone].    Because you are pregnant, we have additional resources for you:      You may call our consulting RN's during normal business hours for non-urgent questions about your pregnancy.      After hours you may also page the midwife on call for urgent questions or issues at [phone].  There is always a midwife on call 24 hours a day.    Prenatal Reminders:    Before 14 weeks: Dating ultrasound, Genetic testing       This ultrasound helps us determine your dates accurately. Verifi can be drawn anytime after 10 weeks of gestation  16 weeks: Optional genetic testing (quad screen) or single AFP       This testing helps understand your baby's risk for some genetic abnormalities.  20 weeks:  Screening ultrasound (fetal survey)       This testing will look for early growth abnormalities, and may tell the baby's sex if you wish to find out.  28 weeks: One hour sugar test (GCT), hemoglobin and platelets       This test helps identify diabetes of pregnancy or gestational diabetes.  We also look      at the iron in your blood and how well your  blood clots.  28 - 36 weeks: Tetanus shot (Tdap)       This shot helps protect you and your baby from tetanus and whooping cough.  36 weeks and later: Group B Strep test (GBS)       This test helps predict if you need antibiotics in labor to prevent infection in your baby.  Anytime September to April:  Flu shot       This shot helps protect you and your family from the flu.  This is especially important during pregnancy        Any time during or after your pregnancy you may experience increased depression and/or mood changes.    We are here to support you. Please contact us if you are:    Feeling anxious    Overwhelmed or sad     Trouble sleeping    Crying uncontrollably    Trouble caring for yourself or baby.    The typical schedule after your first visit today you can expect:     Visit 2 - 12-16 weeks  Visit 3 - 20 weeks  Visit 4 - 24 weeks  Visit 5 - 28 weeks  Visit 6 - 32 weeks  Visit 7 - 34 weeks  Visit 8 - 36 weeks  Weekly after 36 weeks until delivery.    If anything comes up between your visits or you have concerns please don't hesitate to contact us.    Secure access to your medical record:  Use [employer] (secure email communication and access to your chart) to send your primary care provider a message or make an appointment. Ask someone on your Team how to sign up for [employer]. To log on to [employer] or for more information in [employer] please visit the website at [email or web address].       Certified Nurse Midwife (CNM) Team  ARIAN Yang, ARIAN Bailey, ARIAN Lea, ARIAN Neil, THEO    Again, thank you for choosing the midwives at [facility].  We are excited to be a part of your pregnancy. Please let us know how we can best partner with you to improve your and your family's health.    Over-the-counter (OTC) medications during pregnancy    Make sure to follow package directions for dosing and information unless otherwise noted on the list.    Morning  sickness/nausea:      Unisom (doxylamine) 12.5 mg (1/2 tab) and Vitamin B6 25-50 mg three times daily. Unisom may cause some drowsiness as it is typically used for sleep. The combination of these medications can be very effective but they can also be taken separately    Dramamine (Dimenhydrinate) 25-50 mg every 4-6 hours as needed    Benadryl (diphenhydramine) 25-50 mg every 4-6 hours     Ginger tablets 1000 mg per day in divided doses    Constipation:      Colace (Docusate sodium)    Metamucil    Citrucel    Milk of magnesia    Fibercon    Miralax (if all other methods have failed)    Diarrhea:    Imodium (loperamide)    Heartburn:      Zantac (ranitidine)    Pepcid (famotidine)    Prilosec (omeprazole)    Antacids-Tums, Maalox (liquid or tablets), Rolaids  Pepto Bismol and [place] are NOT RECOMMENDED for use during pregnancy because they contain aspirin    Hemorrhoids:      Tucks pads/ointment    Anusol/Anusol-HC    Preparation H    Gas Pain  Simethicone (Gas-X, Mylanta Gas, Mylicon)      Cough, cold, and congestion:      Robitussin (dextromethorphan) and Robitussin DM (dextromethorphan and guaifenesin)    Cough drops/zinc lozenges    Mucinex    Sudafed (after 16 weeks gestation)    Vicks Vapo rub  Cough medicine with alcohol like Nyquil is not recommended during pregnancy    Headache:      Acetaminophen (Tylenol) 650 mg every 4-6 hours or 1000 mg every 6 hours, do not exceed 4000 mg in 24 hours   Ibuprofen (Advil/Motrin) and Naproxen (Aleve) are not recommended during the 1st or 3rd trimester of pregnancy    Allergy:      Benadryl (diphenhydramine)    Zyrtec (cetirizine)    Claritin (loratadine)    Rash/Itching:      Benadryl lotion    Cortaid cream (Hydrocortisone cream)    Benadryl (diphenhydramine)    Vaginal yeast infection:      Monistat 3 or 7 day    Gyne-Lotrimin    Acne:      Benzoyl Peroxide    Salicylic acid     If you have questions or concerns about any medications that are available OTC or you  "are unsure if something is safe call:    [facility]  [phone]      Genetic Screening in Pregnancy    There are several options you have for genetic screening in your pregnancy.  Everyone has their own personal reasons to screen or not to screen.  We want you to make the best choice for you and your pregnancy.  Below is a list of options, what they screen for and when the screening is done.  Genetic screening is recommended for women who are 35 and older at delivery and for those with a family history of chromosomal abnormalities. However, screening is offered to all women.    Innatal:      This is a screening for the more common chromosome abnormalities, including trisomy 21 (Down's syndrome), trisomy 18, trisomy 13 and sex chromosome abnormalities. This is a prenatal test that can be done as early as 10 weeks gestation.       A maternal blood sample is drawn that sequences cell-free DNA in maternal plasma. This in turn allows for molecular counting of chromosome copy numbers with a >99% detection rate of Down syndrome, a 97% detection rate of Trisomy 18, and a 78% detection rate of Trisomy 13.    This test will give information about the gender of the baby.  You can choose to not have that disclosed.       Results come back within 10-14 days.     About 5% of samples will have results that are designated as \"indeterminate\" or \"uninterruptable.\" With this type of result, genetic counseling and diagnostic testing are recommended. Remember this is a screening test and does not definitively diagnose or exclude the presence of these chromosome conditions.     This screening may or may not be covered by insurance.  We recommend you consult your insurance company to discuss.  Financial assistance is available at a low cost if not covered by your insurance.         Hemoglobin Electrophoresis:  Hemoglobin electrophoresis is a non-invasive blood test that looks at abnormal hemoglobin (component of red blood cells) " production. Examples of this include sickle cell anemia (which is a disorder of the red blood cells and their shape) and the thalassemia s (which is also a form of anemia).  High risk groups include:  , Southeast Asians, , Mediterranean, Swedish, South and Central American and [place] descent. This blood test is usually done with your first OB labs.  This is a one-time test.      Trio Carrier Screen:   1.  Cystic Fibrosis (CF) is the most common life-shortening autosomal  recessive disease among  populations, with a frequency of 1 in  Every 3,500 live births. Although it mainly affects the   Population anyone can request screening. If you have a family history of  cystic fibrosis you should request this test.  This screening is a blood draw      2.  Spinal Muscular Atrophy (SMA) is the most common inherited cause  of infant death.  The most common form of this disorder causes death by a age two.  One in every 6,000 to 10,000 babies born in the  has SMA      3.  Fragile X Syndrome (FXS) is the most common inherited cause of  intellectual disability.  Approximately 1 in every 3,600 boys and 1 in every  6,000 girls is born with FXS      **If you are a carrier of CF or SMA, your partner will also need to be tested. This partner testing is offered free of charge.  This screen can be done prior to pregnancy or at any time during the pregnancy.      Quad Screen:    The quad screen is a quadruple marker screening test done during the second trimester of your pregnancy. This prenatal screening test that measures levels of four substances in a pregnant woman's blood; Alpha-fetoprotein (AFP), Human chorionic gonadotropin (HCG), Estriol, and Inhibin A.     AFP screens for open neural tube defects like Spina Bifida and Anencephaly.     Spina bifida is a serious birth defect that occurs when a portion of the neural tube fails to develop or close properly, causing defects in the spinal  cord and in the bones of the spine.     Anencephaly is a serious birth defect in the closure of the neural tube, resulting in an underdeveloped brain and an incomplete skull.     Human chorionic gonadotropin (HCG), Estriol, and Inhibin screen for Down syndrome (trisomy 21) and Trisomy 18.     Down syndrome is a chromosomal disorder that causes lifelong intellectual disability and developmental delays and, in some people, health problems    Trisomy 18 is a chromosomal disorder that causes severe developmental delays and anatomic abnormalities. It is often fatal by age 1.    The screening is ideally done between 15 and 18 weeks of pregnancy but can be done up to week 20. Even if you have done the Verifi testing you can still get a single AFP drawn to check for neural tube defects.       Screening Ultrasound:    This is a fetal survey done around 20 weeks gestation.  This screen will look at the cardiac activity, fetal heart rate and rhythm, assessment of the amniotic fluid volume, placenta appearance and location, and the umbilical cord vessel number and placental insertion site.  They also do many fetal measurements to make sure the baby is growing properly.  The cervical length is also measured and fetal movement is evaluated.  The sex of the baby can usually be determined.      In the event of a positive screen:    There are two diagnostic tests available if any screening tests come back with an increased risk.  You would first be referred to Maternal Fetal Medicine to be seen by a genetic counselor.  They would assess your risk and see if further diagnostic testing is warranted.  The options are; chorionic villus sampling (CVS), usually done at 11 to 12 weeks of pregnancy and amniocentesis which is generally done later in pregnancy around 15 to 20 weeks.  All risks/benefits would be explained and you can decide what course of action is best for you and your family.    Why you might choose to screen?    A desire to  know as much as you can about your baby    If your baby had a genetic abnormality you can learn about it before they are born    Choose whether to continue the pregnancy or to terminate    Why you might choose to NOT screen?    You feel that whatever happens is fine and you would not terminate    You know you don't want to do any diagnostic tests even if the screening test showed a high possibility of a genetic abnormality        For further information please call:  Kailash Rodriguez  [phone]

## 2021-04-26 ENCOUNTER — TELEPHONE (OUTPATIENT)
Dept: OBGYN | Facility: CLINIC | Age: 21
End: 2021-04-26

## 2021-04-26 DIAGNOSIS — O21.9 NAUSEA/VOMITING IN PREGNANCY: Primary | ICD-10-CM

## 2021-04-26 RX ORDER — ONDANSETRON 4 MG/1
4 TABLET, ORALLY DISINTEGRATING ORAL EVERY 8 HOURS PRN
Qty: 30 TABLET | Refills: 0 | Status: SHIPPED | OUTPATIENT
Start: 2021-04-26 | End: 2021-07-02

## 2021-04-26 NOTE — TELEPHONE ENCOUNTER
Pt calling with nausea that has come back for the past week.  Was gone from 12-13 weeks.  Today she felt like she was going to pass out while sitting in class.  Got dizzy, her face felt numb and vision started to black out.   She had eaten and drank this AM and kept it down.  Feeling OK now. Is going home to eat.    Can she have Rx for nausea?  Had Reglan before, but it did make her tired.  Asking for an alternative.  Please advise.    Tatyana Diaz RN

## 2021-04-26 NOTE — TELEPHONE ENCOUNTER
Pt advised.  She is feeling better now.  Advised to go from sitting to standing slowly.  Let us know if dizziness does not improve.    Tatyana Diaz RN

## 2021-05-07 ENCOUNTER — MYC MEDICAL ADVICE (OUTPATIENT)
Dept: OBGYN | Facility: CLINIC | Age: 21
End: 2021-05-07

## 2021-05-07 DIAGNOSIS — O99.612 CONSTIPATION DURING PREGNANCY IN SECOND TRIMESTER: Primary | ICD-10-CM

## 2021-05-07 DIAGNOSIS — K59.00 CONSTIPATION DURING PREGNANCY IN SECOND TRIMESTER: Primary | ICD-10-CM

## 2021-05-10 RX ORDER — LACTULOSE 10 G/10G
10-20 SOLUTION ORAL DAILY PRN
Qty: 30 PACKET | Refills: 1 | Status: SHIPPED | OUTPATIENT
Start: 2021-05-10 | End: 2021-08-17

## 2021-05-17 ENCOUNTER — PRENATAL OFFICE VISIT (OUTPATIENT)
Dept: OBGYN | Facility: CLINIC | Age: 21
End: 2021-05-17
Payer: COMMERCIAL

## 2021-05-17 VITALS
SYSTOLIC BLOOD PRESSURE: 98 MMHG | BODY MASS INDEX: 19.72 KG/M2 | WEIGHT: 122.7 LBS | DIASTOLIC BLOOD PRESSURE: 60 MMHG | HEIGHT: 66 IN

## 2021-05-17 DIAGNOSIS — Z34.92 PRENATAL CARE IN SECOND TRIMESTER: Primary | ICD-10-CM

## 2021-05-17 LAB — HBA1C MFR BLD: 4.8 % (ref 0–5.6)

## 2021-05-17 PROCEDURE — 99207 PR PRENATAL VISIT: CPT | Performed by: ADVANCED PRACTICE MIDWIFE

## 2021-05-17 PROCEDURE — 83036 HEMOGLOBIN GLYCOSYLATED A1C: CPT | Performed by: ADVANCED PRACTICE MIDWIFE

## 2021-05-17 PROCEDURE — 99000 SPECIMEN HANDLING OFFICE-LAB: CPT | Performed by: ADVANCED PRACTICE MIDWIFE

## 2021-05-17 PROCEDURE — 99N1100 PR STATISTIC VERIFI PRENATAL TRISOMY 21,18,13: Mod: 90 | Performed by: ADVANCED PRACTICE MIDWIFE

## 2021-05-17 PROCEDURE — 36415 COLL VENOUS BLD VENIPUNCTURE: CPT | Performed by: ADVANCED PRACTICE MIDWIFE

## 2021-05-17 ASSESSMENT — MIFFLIN-ST. JEOR: SCORE: 1333.31

## 2021-05-17 NOTE — PROGRESS NOTES
"S: Feeling better! Lactulose and Benefiber keeping stools regular. Nausea improving; still has some food aversions but a much better appetite overall. Concerned about weight loss and healthy weight gain moving forward. Denies LOF, vaginal bleeding, uterine discomfort.    O:  BP 98/60   Ht 1.676 m (5' 6\")   Wt 55.7 kg (122 lb 11.2 oz)   LMP 01/25/2021 (Approximate)   BMI 19.80 kg/m    Exam:  Constitutional: Healthy, alert and no distress  Respiratory: Respirations even and unlabored  Gastrointestinal: Abdomen soft, non-tender. Fundus measures appropriately for gestational age. Fetal heart tones heard easily.  Psychiatric: Mentation appears normal and affect normal/bright    A/P: (Z34.92) Prenatal care in second trimester  (primary encounter diagnosis)  Plan: US OB > 14 Weeks, Non Invasive Prenatal Test         Cell Free DNA, Hemoglobin A1c  - Reviewed genetic screening options. Patient desires NIPT today.  - May discontinue stool softeners if regular with aforementioned supplements.  - Reviewed weight gain expectations of 25-35 lbs per BMI. Lost 15 lbs from prepregnancy weight but had regained 2lbs prior to NOB visit. Reassured patient that weight can fluctuate in the first half of pregnancy and that most gain will occur in the third trimester. Encouraged high-protein, nutrient dense snacks/meals.  - Plan anatomy screen with next visit between 20-22 weeks.  - Return to clinic in 3-4 weeks.    Susana Pretty, DNP, APRN, CNM      "

## 2021-05-17 NOTE — NURSING NOTE
"16w6d    Chief Complaint   Patient presents with     Prenatal Care     discuss weight gain--lost 15lbs due to morning sickness       Initial BP 98/60   Ht 1.676 m (5' 6\")   Wt 55.7 kg (122 lb 11.2 oz)   LMP 2021 (Approximate)   BMI 19.80 kg/m   Estimated body mass index is 19.8 kg/m  as calculated from the following:    Height as of this encounter: 1.676 m (5' 6\").    Weight as of this encounter: 55.7 kg (122 lb 11.2 oz).  BP completed using cuff size: regular    Questioned patient about current smoking habits.  Pt. has never smoked.          The following HM Due: NONE             "

## 2021-05-24 ENCOUNTER — TELEPHONE (OUTPATIENT)
Dept: OBGYN | Facility: CLINIC | Age: 21
End: 2021-05-24

## 2021-05-24 LAB — LAB SCANNED RESULT: NORMAL

## 2021-05-24 NOTE — TELEPHONE ENCOUNTER
Results received from [facility] testing in [facility].  Testing done:  Innatal Prenatal Screen    Action:  Spoke to pt and gave NORMAL results.    Gender:**BOY**  Gender revealed via letter for pt to .    Routed to provider as DARCY BERNABE RN

## 2021-06-09 ENCOUNTER — TELEPHONE (OUTPATIENT)
Dept: OBGYN | Facility: CLINIC | Age: 21
End: 2021-06-09

## 2021-06-09 ENCOUNTER — PRENATAL OFFICE VISIT (OUTPATIENT)
Dept: OBGYN | Facility: CLINIC | Age: 21
End: 2021-06-09
Payer: COMMERCIAL

## 2021-06-09 VITALS — SYSTOLIC BLOOD PRESSURE: 106 MMHG | WEIGHT: 126 LBS | BODY MASS INDEX: 20.34 KG/M2 | DIASTOLIC BLOOD PRESSURE: 62 MMHG

## 2021-06-09 DIAGNOSIS — Z34.82 ENCOUNTER FOR SUPERVISION OF OTHER NORMAL PREGNANCY IN SECOND TRIMESTER: Primary | ICD-10-CM

## 2021-06-09 DIAGNOSIS — N94.9 VAGINAL SYMPTOM: ICD-10-CM

## 2021-06-09 DIAGNOSIS — M54.50 LOW BACK PAIN, UNSPECIFIED BACK PAIN LATERALITY, UNSPECIFIED CHRONICITY, UNSPECIFIED WHETHER SCIATICA PRESENT: ICD-10-CM

## 2021-06-09 DIAGNOSIS — N94.9 VAGINAL SYMPTOM: Primary | ICD-10-CM

## 2021-06-09 LAB
ALBUMIN UR-MCNC: NEGATIVE MG/DL
APPEARANCE UR: CLEAR
BILIRUB UR QL STRIP: NEGATIVE
COLOR UR AUTO: YELLOW
GLUCOSE UR STRIP-MCNC: NEGATIVE MG/DL
HGB UR QL STRIP: NEGATIVE
KETONES UR STRIP-MCNC: NEGATIVE MG/DL
LEUKOCYTE ESTERASE UR QL STRIP: NEGATIVE
NITRATE UR QL: NEGATIVE
PH UR STRIP: 6 PH (ref 5–7)
SOURCE: NORMAL
SP GR UR STRIP: <=1.005 (ref 1–1.03)
SPECIMEN SOURCE: ABNORMAL
UROBILINOGEN UR STRIP-ACNC: 0.2 EU/DL (ref 0.2–1)
WET PREP SPEC: ABNORMAL

## 2021-06-09 PROCEDURE — 99207 PR PRENATAL VISIT: CPT | Performed by: ADVANCED PRACTICE MIDWIFE

## 2021-06-09 PROCEDURE — 87210 SMEAR WET MOUNT SALINE/INK: CPT | Performed by: ADVANCED PRACTICE MIDWIFE

## 2021-06-09 PROCEDURE — 81003 URINALYSIS AUTO W/O SCOPE: CPT | Performed by: ADVANCED PRACTICE MIDWIFE

## 2021-06-09 RX ORDER — METRONIDAZOLE 500 MG/1
500 TABLET ORAL 2 TIMES DAILY
Qty: 14 TABLET | Refills: 0 | Status: SHIPPED | OUTPATIENT
Start: 2021-06-09 | End: 2021-06-16

## 2021-06-09 NOTE — PROGRESS NOTES
S: Here with concern of cramping. 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 Went home and tried to rest, since then the cramping has improved but she continues to feel some low back pain intermittently. No leaking fluid, no bleeding, no abnormal vaginal or urinary symptoms. Does report increased discharge during the pregnancy. Endorses +FM. No recent intercourse, no concerns for STDs.      Has started feeling fetal movement.  Denies uterine cramping, vaginal bleeding or leaking of fluid  O: Vitals: /62 (BP Location: Right arm, Cuff Size: Adult Regular)   Wt 57.2 kg (126 lb)   LMP 01/25/2021 (Approximate)   BMI 20.34 kg/m    BMI= Body mass index is 20.34 kg/m .  Exam:  Constitutional: healthy, alert and no distress  Respiratory: respirations even and unlabored  Gastrointestinal: Abdomen soft, non-tender. Fundus measures appropriate for gestational age. Fetal heart tones hear without difficulty and within normal limits  : Deferred  Psychiatric: mentation appears normal and affect normal/bright    A/P:   (Z34.82) Encounter for supervision of other normal pregnancy in second trimester  (primary encounter diagnosis)  20 week fetal scan scheduled for next week    Encouraged patient to call with any questions or concerns.  RTO 4 weeks     (M54.9) Back pain  -U/A and wet prep sent for sx of cramping and back pain  -Reassurance provided that FHR was normal   -Reviewed RLP and warning s/sx to report in early pregnancy including signs of SAB  -Encouraged rest/hydration and continuing to monitor symptoms. Call with persistent cramping/contractions or if accompanied with bleeding, leaking fluid or other abnormal symptoms.    (N94.9) Vaginal symptom      AIDA Charles CNM 6/9/2021 3:03 PM

## 2021-06-09 NOTE — TELEPHONE ENCOUNTER
Patient calling:  Started having intermittent severe cramping and back pain which started about 30 min ago.  Drinking plenty of fluid.  No bleeding, normal vaginal discharge.  Denies difficulty with urinary or bowels.  No fever.  Would like to be seen.  Appt scheduled for today.  Esthela Johnston RN

## 2021-06-09 NOTE — NURSING NOTE
"Chief Complaint   Patient presents with     Prenatal Care     20w 1d, c/o back pain since this morning and cramping starting around noon       Initial /62 (BP Location: Right arm, Cuff Size: Adult Regular)   Wt 57.2 kg (126 lb)   LMP 2021 (Approximate)   BMI 20.34 kg/m   Estimated body mass index is 20.34 kg/m  as calculated from the following:    Height as of 21: 1.676 m (5' 6\").    Weight as of this encounter: 57.2 kg (126 lb).  BP completed using cuff size: regular    Questioned patient about current smoking habits.  Pt. has never smoked.          The following HM Due: NONE  Laura Alvares CMA    "

## 2021-06-16 ENCOUNTER — ANCILLARY PROCEDURE (OUTPATIENT)
Dept: ULTRASOUND IMAGING | Facility: CLINIC | Age: 21
End: 2021-06-16
Attending: ADVANCED PRACTICE MIDWIFE
Payer: COMMERCIAL

## 2021-06-16 DIAGNOSIS — Z34.92 PRENATAL CARE IN SECOND TRIMESTER: ICD-10-CM

## 2021-06-16 PROCEDURE — 76805 OB US >/= 14 WKS SNGL FETUS: CPT | Performed by: OBSTETRICS & GYNECOLOGY

## 2021-06-17 ENCOUNTER — PRE VISIT (OUTPATIENT)
Dept: MATERNAL FETAL MEDICINE | Facility: CLINIC | Age: 21
End: 2021-06-17

## 2021-06-17 ENCOUNTER — TRANSCRIBE ORDERS (OUTPATIENT)
Dept: MATERNAL FETAL MEDICINE | Facility: CLINIC | Age: 21
End: 2021-06-17

## 2021-06-17 ENCOUNTER — TELEPHONE (OUTPATIENT)
Dept: OBGYN | Facility: CLINIC | Age: 21
End: 2021-06-17

## 2021-06-17 DIAGNOSIS — O26.90 PREGNANCY RELATED CONDITION, ANTEPARTUM: Primary | ICD-10-CM

## 2021-06-17 DIAGNOSIS — Z34.82 ENCOUNTER FOR SUPERVISION OF OTHER NORMAL PREGNANCY IN SECOND TRIMESTER: Primary | ICD-10-CM

## 2021-06-17 NOTE — TELEPHONE ENCOUNTER
Patient called regarding ultrasound results that were released to her.  Results reviewed.  Pt reassured.  [facility] referral placed for f/u ultrasound.  Esthela Johnston RN

## 2021-06-24 ENCOUNTER — OFFICE VISIT (OUTPATIENT)
Dept: MATERNAL FETAL MEDICINE | Facility: CLINIC | Age: 21
End: 2021-06-24
Attending: ADVANCED PRACTICE MIDWIFE
Payer: COMMERCIAL

## 2021-06-24 ENCOUNTER — HOSPITAL ENCOUNTER (OUTPATIENT)
Dept: ULTRASOUND IMAGING | Facility: CLINIC | Age: 21
End: 2021-06-24
Attending: ADVANCED PRACTICE MIDWIFE
Payer: COMMERCIAL

## 2021-06-24 DIAGNOSIS — O26.90 PREGNANCY RELATED CONDITION, ANTEPARTUM: ICD-10-CM

## 2021-06-24 DIAGNOSIS — O35.9XX0 FETAL ABNORMALITY AFFECTING MANAGEMENT OF MOTHER, ANTEPARTUM, SINGLE OR UNSPECIFIED FETUS: Primary | ICD-10-CM

## 2021-06-24 PROCEDURE — 76811 OB US DETAILED SNGL FETUS: CPT | Mod: 26 | Performed by: OBSTETRICS & GYNECOLOGY

## 2021-06-24 PROCEDURE — 76811 OB US DETAILED SNGL FETUS: CPT

## 2021-06-24 NOTE — PROGRESS NOTES
Please see full imaging report from [employer] program under imaging tab.    Pankaj Phillips MD  Maternal Fetal Medicine

## 2021-07-02 ENCOUNTER — PRENATAL OFFICE VISIT (OUTPATIENT)
Dept: OBGYN | Facility: CLINIC | Age: 21
End: 2021-07-02
Payer: COMMERCIAL

## 2021-07-02 VITALS — DIASTOLIC BLOOD PRESSURE: 64 MMHG | WEIGHT: 127.9 LBS | BODY MASS INDEX: 20.64 KG/M2 | SYSTOLIC BLOOD PRESSURE: 102 MMHG

## 2021-07-02 DIAGNOSIS — Z34.82 ENCOUNTER FOR SUPERVISION OF OTHER NORMAL PREGNANCY IN SECOND TRIMESTER: ICD-10-CM

## 2021-07-02 PROCEDURE — 99207 PR PRENATAL VISIT: CPT | Performed by: ADVANCED PRACTICE MIDWIFE

## 2021-07-02 NOTE — PROGRESS NOTES
Nicollette M Armijos presents to clinic alone at 23w3d for a routine prenatal appointment. She reports she is feeling well overall-- sometimes dizzy / one low BP at work. Reviewed normal BP drop in pregnancy, encouraged additional hydration w/ electrolytes. Normal fetal movement. Denies bleeding or cramping.    Low weight gain: Reviewed total weight gain of -3.674 kg (-8 lb 1.6 oz). Below range for expected total weight gain of 11.5 kg (25 lb)-16 kg (35 lb) with 2 lb gain. Eating normally. EFW 48% on last ultrasound. FH appropriate. Continue to monitor.    COVID-19 Mitigation: vaccinated in early pregnancy-- Faustino is too!  Pregnancy checklist updated. Reviewed GCT, RPR, CBC & tdap at next visit. Warning signs reviewed. RTC in 4 weeks, sooner if problems.

## 2021-07-06 ENCOUNTER — TELEPHONE (OUTPATIENT)
Dept: OBGYN | Facility: CLINIC | Age: 21
End: 2021-07-06

## 2021-07-06 DIAGNOSIS — R39.9 URINARY SYMPTOM OR SIGN: Primary | ICD-10-CM

## 2021-07-06 NOTE — TELEPHONE ENCOUNTER
Pt calling with urinary sx.  Has urgency, but goes very little.  Also notes burning with urination.    Will do walk in lab UA tonight as her schedule allows.  Also asks about AZO for sx relief.  There are no contraindications in pregnancy to this.    Routing to Kathy THURMAN who I discussed with and is on call this evening.    Tatyana Diaz RN

## 2021-07-06 NOTE — TELEPHONE ENCOUNTER
Agree with plan.      ARIAN Yang, CNM   Patient Education   Patient Education     Bronchospasm (Child)    When your child breathes, the air goes down his or her main windpipe (trachea) and through the bronchi into the lungs. The bronchi are the 2 tubes that lead from the trachea to the left and right lungs. If the bronchi get irritated and inflamed, they can narrow. This is because the muscles around the air passages in the lung go into spasm. This makes it hard to breathe. This condition is called bronchospasm.  Bronchospasm can be caused by many things. These include allergies, asthma, a respiratory infection, or reaction to a medication.  Bronchospasm makes it hard to breathe out. It causes wheezing when exhaling. Wheezing is a whistling sound caused by breathing through narrowed airways. Bronchospasm can also cause frequent coughing without the wheezing sound. A child with bronchospasm may cough, wheeze, or be short of breath. The inflamed area creates mucus. The mucus can partially block the airways. The chest muscles can tighten. The child can also have a fever.  A child with bronchospasm may be given medicine to take at home. A child with severe bronchospasm may need to stay in the hospital for 1 or more nights. He or she is given intravenous (IV) fluids and oxygen.  Children with asthma often get bronchospasm. But not all children with bronchospasm have asthma. If a child has repeated bouts of bronchospasm, then he or she may need to be tested for asthma.  Home care  Follow these guidelines when caring for your child at home:  · Your child's health care provider may prescribe medicines. Follow all instructions for giving these to your child. Don’t give your child any medicines that have not been approved by the provider. Your child may be prescribed bronchodilator medicine. This is to help with breathing. It may come as a spray, inhaler, or pill to take by mouth. Make sure your child uses the medicine exactly at the times advised. Follow all  instructions for giving these medicines to your child.  · Don’t give a child under age 6 cough or cold medicine unless the health care provider tells you to do so.  · Know the warning signs of a bronchospasm attack. These include irritability, restless sleep, fever, and cough. Your child may have no interest in feeding. Learn what medicines to give if you see these signs.  · Wash your hands well with soap and warm water before and after caring for your child. This is to help prevent spreading infection.  · Give your child plenty of time to rest. Have your child sleep in a slightly upright position. This is to help make breathing easier. If possible, raise the head of the mattress a few inches. Or prop your child’s body up with pillows. Don’t put pillows or other soft objects in the crib of babies under 12 months of age.  · To prevent dehydration and help loosen lung mucus in toddlers and older children, make sure your child drinks plenty of liquids. Children may prefer cold drinks, frozen desserts, or popsicles. They may also like warm chicken soup or drinks with lemon and honey. Don’t give honey to a child younger than 1 year old.  ·  To prevent dehydration and help loosen lung mucus in babies, make sure your child drinks plenty of liquids. Use a medicine dropper, if needed, to give small amounts of breast milk, formula, or clear liquids to your baby. Give 1 to 2 teaspoons every 10 to 15 minutes. A baby may only be able to feed for short amounts of time. If you are breastfeeding, pump and store milk for later use. Give your child oral rehydration solution between feedings. These are available from the drug store.  · Don’t smoke around your child. Tobacco smoke can make your child’s symptoms worse.  Follow-up care   Follow up with your child’s health care provider.  Special note to parents  Don’t give cough and cold medicines to any child under age 6. These have been shown to not help young children, and may cause  serious side effects.  When to seek medical advice  Call your child's health care provider right away if any of these occur:  · Fever of 100.4°F (38°C) or higher  · No improvement within 24 hours of treatment  · Symptoms that don’t get better, or get worse  · Cough with lots of thick colored mucus  · Trouble breathing that doesn’t get better, or gets worse  · Fast breathing  · Loss of appetite or poor feeding  · Signs of dehydration, such as dry mouth, crying with no tears, or urinating less than normal  · More medicine than prescribed is needed to help relieve wheezing  · The medicine doesn’t relieve wheezing  © 0091-9283 [employer]. [address] [place] [address], [place] [address]. All rights reserved. This information is not intended as a substitute for professional medical care. Always follow your healthcare professional's instructions.           Viral Croup  Croup is an illness that causes a child’s voice box (larynx) and windpipe (trachea) to become irritated and swell. This makes it difficult for the child to talk and breathe. It is caused by a virus. It often occurs in children under 6 years of age. The respiratory distress croup causes can be scary. But most children fully recover from croup in 5 or 6 days. Viral croup is contagious for the first 3 days of symptoms.  You child may have had a mild fever for a day or two. Or he or she may have just had a cold. Symptoms of croup occur more often at night. Difficulty breathing, especially taking in a breath, occurs suddenly. Your child may sit upright and lean forward trying to breathe. He or she may be restless and agitated. Your child may make a musical sound when breathing in. This is called stridor. Other symptoms include a voice that is hoarse and hard to hear and a barking cough. Children with croup may have a difficult time swallowing. They may drool and have trouble eating. Some children develop sore throats and ear infections. In the course of  5 or 6 days, croup symptoms will come and go.  In most cases, croup can be safely treated at home. You may be given medication for your child. A warm, steamy bathroom often eases symptoms. A cool humidifier or vaporizer in the bedroom also eases breathing during the night.  Home care  The health care provider may prescribe a medication to reduce swelling, make breathing easier, and treat fever. Follow all instructions for giving this medication to your child.  Moist air is easier to breathe. To help your child’s breathing:  · Put a cool mist humidifier or vaporizer in the child’s bedroom.  · Provide warm mist by turning on the bathroom shower to the hottest setting. Have your child sit in the warm, steamy bathroom for 15 to 20 minutes. Repeat this as needed.  · Afterward, wrap your child well and take him or her into cool, moist air. Alternating the cool air with the warm steam may help ease symptoms.  General care:  · Sleep in the same room with your child, if possible, to observe his or her breathing. Check your child’s chest and ability to breathe.  · Don't put a finger down your child’s throat or try to make him or her vomit. If your child does vomit, hold his or her head down, then quickly sit your child back up.  · Don’t give your child cough drops or cough syrup. They will not help the swelling. They may also make it harder to cough up any secretions.  · Make sure your child drinks plenty of clear fluids, such as water or diluted apple juice. Warm liquids may be more soothing.  · Don't let anyone smoke around your child.  Follow-up care  Follow up with your child’s health care provider.  Special note to parents  Viral croup is contagious for the first 3 days of symptoms. Wash your hands with soap and warm water before and after caring for your child. Limit your child’s contact with other people. This is to help prevent the spread of infection.  When to seek medical advice  Call your child's health care  provider right away if any of these occur:  · Fever of 100.4°F (38°C) or higher  · Cough or other symptoms don't get better  · Trouble breathing, even at rest  · Poor chest expansion  · Skin on your child's chest pulls in when he or she breathes  · Whistling sounds when breathing  · Bluish tint around your child’s mouth and fingernails  · Severe drooling  · Pain when swallowing  · Poor eating  · Trouble talking  · Your child doesn't get better within a week  © 7676-5329 [employer]. [address] [place] [address]. All rights reserved. This information is not intended as a substitute for professional medical care. Always follow your healthcare professional's instructions.

## 2021-07-30 ENCOUNTER — PRENATAL OFFICE VISIT (OUTPATIENT)
Dept: OBGYN | Facility: CLINIC | Age: 21
End: 2021-07-30
Payer: COMMERCIAL

## 2021-07-30 VITALS — SYSTOLIC BLOOD PRESSURE: 96 MMHG | BODY MASS INDEX: 21.95 KG/M2 | WEIGHT: 136 LBS | DIASTOLIC BLOOD PRESSURE: 66 MMHG

## 2021-07-30 DIAGNOSIS — Z34.82 ENCOUNTER FOR SUPERVISION OF OTHER NORMAL PREGNANCY IN SECOND TRIMESTER: Primary | ICD-10-CM

## 2021-07-30 DIAGNOSIS — F41.1 GAD (GENERALIZED ANXIETY DISORDER): ICD-10-CM

## 2021-07-30 LAB
ERYTHROCYTE [DISTWIDTH] IN BLOOD BY AUTOMATED COUNT: 13.1 % (ref 10–15)
HCT VFR BLD AUTO: 35.4 % (ref 35–47)
HGB BLD-MCNC: 11.5 G/DL (ref 11.7–15.7)
MCH RBC QN AUTO: 32.7 PG (ref 26.5–33)
MCHC RBC AUTO-ENTMCNC: 32.5 G/DL (ref 31.5–36.5)
MCV RBC AUTO: 101 FL (ref 78–100)
PLATELET # BLD AUTO: 218 10E3/UL (ref 150–450)
RBC # BLD AUTO: 3.52 10E6/UL (ref 3.8–5.2)
WBC # BLD AUTO: 10 10E3/UL (ref 4–11)

## 2021-07-30 PROCEDURE — 90715 TDAP VACCINE 7 YRS/> IM: CPT | Performed by: ADVANCED PRACTICE MIDWIFE

## 2021-07-30 PROCEDURE — 86780 TREPONEMA PALLIDUM: CPT | Performed by: ADVANCED PRACTICE MIDWIFE

## 2021-07-30 PROCEDURE — 90471 IMMUNIZATION ADMIN: CPT | Performed by: ADVANCED PRACTICE MIDWIFE

## 2021-07-30 PROCEDURE — 82952 GTT-ADDED SAMPLES: CPT | Performed by: ADVANCED PRACTICE MIDWIFE

## 2021-07-30 PROCEDURE — 85027 COMPLETE CBC AUTOMATED: CPT | Performed by: ADVANCED PRACTICE MIDWIFE

## 2021-07-30 PROCEDURE — 99207 PR PRENATAL VISIT: CPT | Performed by: ADVANCED PRACTICE MIDWIFE

## 2021-07-30 PROCEDURE — 36415 COLL VENOUS BLD VENIPUNCTURE: CPT | Performed by: ADVANCED PRACTICE MIDWIFE

## 2021-07-30 NOTE — PROGRESS NOTES
Feeling well.  Baby is active. Denies any leaking of fluid, vaginal bleeding, regular uterine contractions, or headaches or other concerns.  Reviewed wt gain.  She is feeling well and eating normally, since she now has no nausea or food aversions.  Fundal height is normal.  Continue to assess.  GCT, labs and TDAP today.    She scored 9 on EPDS and says that this is low for her.  15 is more common.  She feels better than usual.  She has done counseling and meds in the past, but declines any resources at this time.  Continue to assess.     Reviewed to call for contractions, loss of fluid, vaginal bleeding, decreased fetal movement or any other questions or concerns.    RTC in 3 weeks.  Beth Michael, DNP, APRN, CNM

## 2021-07-30 NOTE — NURSING NOTE
"Chief Complaint   Patient presents with     Prenatal Care     27w 3d, GCT and tdap today, discuss growth       Initial BP 96/66 (BP Location: Left arm, Cuff Size: Adult Regular)   Wt 61.7 kg (136 lb)   LMP 2021 (Approximate)   BMI 21.95 kg/m   Estimated body mass index is 21.95 kg/m  as calculated from the following:    Height as of 21: 1.676 m (5' 6\").    Weight as of this encounter: 61.7 kg (136 lb).  BP completed using cuff size: regular    Questioned patient about current smoking habits.  Pt. has never smoked.          The following HM Due: NONE    "

## 2021-07-31 LAB
GLUCOSE 1H P 50 G GLC PO SERPL-MCNC: 109 MG/DL (ref 70–129)
T PALLIDUM AB SER QL: NONREACTIVE

## 2021-08-16 PROBLEM — Z34.83 ENCOUNTER FOR SUPERVISION OF OTHER NORMAL PREGNANCY, THIRD TRIMESTER: Status: ACTIVE | Noted: 2021-04-19

## 2021-08-17 ENCOUNTER — PRENATAL OFFICE VISIT (OUTPATIENT)
Dept: OBGYN | Facility: CLINIC | Age: 21
End: 2021-08-17
Payer: COMMERCIAL

## 2021-08-17 VITALS — BODY MASS INDEX: 22.18 KG/M2 | WEIGHT: 137.4 LBS | DIASTOLIC BLOOD PRESSURE: 64 MMHG | SYSTOLIC BLOOD PRESSURE: 104 MMHG

## 2021-08-17 DIAGNOSIS — Z34.83 ENCOUNTER FOR SUPERVISION OF OTHER NORMAL PREGNANCY, THIRD TRIMESTER: Primary | ICD-10-CM

## 2021-08-17 DIAGNOSIS — O99.340 ANXIETY DURING PREGNANCY: ICD-10-CM

## 2021-08-17 DIAGNOSIS — F41.9 ANXIETY DURING PREGNANCY: ICD-10-CM

## 2021-08-17 PROCEDURE — 99207 PR PRENATAL VISIT: CPT | Performed by: ADVANCED PRACTICE MIDWIFE

## 2021-08-17 NOTE — PROGRESS NOTES
"S: Endorses mild fatigue and some residual musculoskeletal discomfort from carrying her 55lb dog last evening after he got into a mess. She did not feel she strained anything. She does report pain in lower abdomen with sneezing, which has been present her entire pregnancy, and seems consistent with round ligament pain, which we reviewed. She is not feeling any uterine cramping or has had any vaginal bleeding/LOF. Baby continues to be active.     O: Vitals: /64 (BP Location: Right arm, Cuff Size: Adult Regular)   Wt 62.3 kg (137 lb 6.4 oz)   LMP 2021 (Approximate)   BMI 22.18 kg/m    BMI= Body mass index is 22.18 kg/m .  Exam:  Constitutional: Healthy, alert and no distress  Respiratory: Respirations even and unlabored  Gastrointestinal: Abdomen soft, non-tender. Fundus measures appropriate for gestational age. Fetal heart tones heard without difficulty and within normal limits.  : Deferred  Psychiatric: Mentation appears normal and affect normal/bright    A/P:  (Z34.83) Encounter for supervision of other normal pregnancy, third trimester  (primary encounter diagnosis)  - TWG 1.4 lbs, below expected range, but appropriate interval gains since 14 lb weight loss in first trimester. She feels she is eating more than usual but healthily and exercising appropriately. Will continue to monitor third trimester trends.  - PPBC discussed. She experienced \"the worst cramps\" with Mirena x3 months after it was inserted immediately following a D&C, was inconsistent with daily pills when on OCPs, and experienced heavy bleeding with Depo. She is not planning on breastfeeding. Will continue to discuss options with her at future encounters.   - Reviewed body mechanics and lifting restrictions in pregnancy.  - C encouraged  - Answered questions re: hospital registration and  supplies for hospital bag    (O99.340,  F41.9) Anxiety during pregnancy  - Feels better than ever being pregnant!   - Nervous about the " mental exhaustion she typically feels when depression is exacerbated on top of the expected postpartum/new parent exhaustion . Discussed other s/sx of PPMD to monitor. May consider restarting Zoloft at delivery.    Encouraged patient to call with any questions or concerns.  Return to clinic in 2 weeks    Susana Pretty, MARY, APRN, CNM

## 2021-08-17 NOTE — PATIENT INSTRUCTIONS
Weeks 27 thru 32 - Gestational Age (Fetal Age - Weeks 25 thru 30):  The fetus really fills out over these next few weeks, storing fat on the body, reaching about 15-17 inches long and weighing about 4-4   lbs by the 32nd week. The lungs are not fully mature yet, but some rhythmic breathing movements are occurring. The bones are fully developed, but are still soft and pliable. The fetus is storing its own calcium, iron and phosphorus. The eyelids open after being closed, since the end of the first trimester.

## 2021-08-17 NOTE — NURSING NOTE
"Chief Complaint   Patient presents with     Prenatal Care       Initial /64 (BP Location: Right arm, Cuff Size: Adult Regular)   Wt 62.3 kg (137 lb 6.4 oz)   LMP 2021 (Approximate)   BMI 22.18 kg/m   Estimated body mass index is 22.18 kg/m  as calculated from the following:    Height as of 21: 1.676 m (5' 6\").    Weight as of this encounter: 62.3 kg (137 lb 6.4 oz).  BP completed using cuff size: regular    Questioned patient about current smoking habits.  Pt. has never smoked.                     "

## 2021-08-22 ENCOUNTER — HOSPITAL ENCOUNTER (OUTPATIENT)
Facility: CLINIC | Age: 21
End: 2021-08-22
Admitting: ADVANCED PRACTICE MIDWIFE
Payer: COMMERCIAL

## 2021-08-22 ENCOUNTER — APPOINTMENT (OUTPATIENT)
Dept: ULTRASOUND IMAGING | Facility: CLINIC | Age: 21
End: 2021-08-22
Attending: ADVANCED PRACTICE MIDWIFE
Payer: COMMERCIAL

## 2021-08-22 ENCOUNTER — HOSPITAL ENCOUNTER (OUTPATIENT)
Facility: CLINIC | Age: 21
Discharge: HOME | End: 2021-08-22
Attending: ADVANCED PRACTICE MIDWIFE | Admitting: ADVANCED PRACTICE MIDWIFE
Payer: COMMERCIAL

## 2021-08-22 ENCOUNTER — NURSE TRIAGE (OUTPATIENT)
Dept: NURSING | Facility: CLINIC | Age: 21
End: 2021-08-22

## 2021-08-22 ENCOUNTER — TRANSFERRED RECORDS (OUTPATIENT)
Dept: HEALTH INFORMATION MANAGEMENT | Facility: CLINIC | Age: 21
End: 2021-08-22

## 2021-08-22 VITALS — SYSTOLIC BLOOD PRESSURE: 105 MMHG | RESPIRATION RATE: 18 BRPM | DIASTOLIC BLOOD PRESSURE: 67 MMHG | TEMPERATURE: 97.7 F

## 2021-08-22 DIAGNOSIS — Z34.83 ENCOUNTER FOR SUPERVISION OF OTHER NORMAL PREGNANCY, THIRD TRIMESTER: Primary | ICD-10-CM

## 2021-08-22 LAB
ALBUMIN UR-MCNC: NEGATIVE MG/DL
APPEARANCE UR: CLEAR
BACTERIA #/AREA URNS HPF: ABNORMAL /HPF
BILIRUB UR QL STRIP: NEGATIVE
CLUE CELLS: ABNORMAL
COLOR UR AUTO: ABNORMAL
CRYSTALS AMN MICRO: NORMAL
GLUCOSE UR STRIP-MCNC: NEGATIVE MG/DL
HGB UR QL STRIP: NEGATIVE
KETONES UR STRIP-MCNC: NEGATIVE MG/DL
LEUKOCYTE ESTERASE UR QL STRIP: NEGATIVE
NITRATE UR QL: NEGATIVE
PH UR STRIP: 6.5 [PH] (ref 5–7)
RBC URINE: <1 /HPF
SP GR UR STRIP: 1 (ref 1–1.03)
TRICHOMONAS, WET PREP: ABNORMAL
UROBILINOGEN UR STRIP-MCNC: NORMAL MG/DL
WBC URINE: 1 /HPF
WBC'S/HIGH POWER FIELD, WET PREP: ABNORMAL
YEAST, WET PREP: ABNORMAL

## 2021-08-22 PROCEDURE — 76815 OB US LIMITED FETUS(S): CPT

## 2021-08-22 PROCEDURE — 87210 SMEAR WET MOUNT SALINE/INK: CPT | Performed by: ADVANCED PRACTICE MIDWIFE

## 2021-08-22 PROCEDURE — 99213 OFFICE O/P EST LOW 20 MIN: CPT | Mod: 25 | Performed by: ADVANCED PRACTICE MIDWIFE

## 2021-08-22 PROCEDURE — 81001 URINALYSIS AUTO W/SCOPE: CPT | Performed by: ADVANCED PRACTICE MIDWIFE

## 2021-08-22 PROCEDURE — 59025 FETAL NON-STRESS TEST: CPT | Mod: 26 | Performed by: ADVANCED PRACTICE MIDWIFE

## 2021-08-22 PROCEDURE — G0463 HOSPITAL OUTPT CLINIC VISIT: HCPCS | Mod: 25

## 2021-08-22 PROCEDURE — 76819 FETAL BIOPHYS PROFIL W/O NST: CPT

## 2021-08-22 RX ORDER — HYDROXYZINE HYDROCHLORIDE 50 MG/1
50 TABLET, FILM COATED ORAL ONCE
Qty: 1 TABLET | Refills: 0 | Status: SHIPPED | OUTPATIENT
Start: 2021-08-22 | End: 2021-08-22

## 2021-08-22 RX ORDER — HYDROXYZINE HYDROCHLORIDE 50 MG/1
50 TABLET, FILM COATED ORAL ONCE
Status: DISCONTINUED | OUTPATIENT
Start: 2021-08-22 | End: 2021-08-22 | Stop reason: HOSPADM

## 2021-08-22 NOTE — PROGRESS NOTES
[facility] CNM TRIAGE NOTE    Nicollette M Armijos is a 21 year old  with and IUP at 30w5d who presents with complaint of increased cramping, pelvic pressure, and lower back pain since last night.     Reports cramping began in her back last night without any associated triggers and lasted for most of the night. By morning, it had subsided to a dull ache, which has been her usual baseline for the majority of the pregnancy. She then had intercourse with no worsening or new symptoms. Around :30 in the afternoon, she reports onset of lower abdominal cramping every 5-10 minutes accompanied by increased lower abdominal pressure, which she has experienced to a lesser extent for the last few weeks. She denies any active or increased leaking of fluid but does state her vaginal discharge has also been more watery in consistency for the last few weeks. Otherwise denies vaginal bleeding, itching, irritation, or odor.    Patient states baby is very active with no changes from its usual pattern of activity.    Present OB History at [facility] with the CNMs    Problems this pregnancy:   1. Anxiety  - Discontinued Zoloft prior to pregnancy  - Symptoms stable; pt considering restarting Zoloft at delivery    2. Back Pain  - discussed at routine PN visit at 20w1d accompanied by concern for cramping, treated for +BV  - not routinely wearing belly band and prefers to avoid Tylenol  - of note, patient did report lifting her 55lb dog in an urgent situation on one occasion the day before her last prenatal visit last week; denied any significant strain at that time      ROS:  Patient is alert and oriented.10 point ROS negative other than the symptoms noted above.      PHYSICAL EXAM:  /67   Temp 97.7  F (36.5  C) (Oral)   Resp 18   LMP 2021 (Approximate)     EFM tracing reviewed and interpreted remotely.    FHT's 130 with moderate variability  Accelerations: present - 15x15 at  30w5d  Decelerations:  absent        Contractions: mild uterine irritability on EFM; pt reports irregular contractions which palpates soft/unable to palpate per RN exam. Patient describes them as low in her pelvis, similar to menstrual cramps lasting for approximately 20 seconds at a time.    Abdomen: gravid, no significant tenderness with palpation, soft, no guarding  Bloody show: no  Cervix: per RN exam, closed with no pooling on SSE; closed/ 0/ Anterior/ firm/ -2 with SVE after tests collected  Membranes are intact     Results for orders placed or performed during the hospital encounter of 21   UA with Microscopic reflex to Culture     Status: Abnormal    Specimen: Urine, Clean Catch   Result Value Ref [facility]    Color Urine Straw Colorless, Straw, Light Yellow, Yellow    Appearance Urine Clear Clear    Glucose Urine Negative Negative mg/dL    Bilirubin Urine Negative Negative    Ketones Urine Negative Negative mg/dL    Specific Gravity Urine 1.005 1.003 - 1.035    Blood Urine Negative Negative    pH Urine 6.5 5.0 - 7.0    Protein Albumin Urine Negative Negative mg/dL    Urobilinogen Urine Normal Normal, 2.0 mg/dL    Nitrite Urine Negative Negative    Leukocyte Esterase Urine Negative Negative    Bacteria Urine Few (A) None Seen /HPF    RBC Urine <1 <=2 /HPF    WBC Urine 1 <=5 /HPF    Narrative    Urine Culture not indicated   Fern Test for Rupture of Membranes     Status: Normal   Result Value Ref [facility]    Fern Crystallization No ferning present No ferning present   Wet prep     Status: Abnormal    Specimen: Vagina; Swab   Result Value Ref [facility]    Trichomonas Absent Absent    Yeast Absent Absent    Clue Cells Absent Absent    WBCs/high power field 1+ (A) None     .    ASSESSMENT :   21 year old  with gutierrez IUP 30w5d not in labor  NST reactive, Category I FHTs  GBS unknown and membranes intact      PLAN:  UA and wet prep negative for infection  Fern negative  fFN unable to be collected d/t recent  intercourse  Pelvic ultrasound ordered and revealed no evidence of abruption  BPP 8/8    No change in SVE on recheck. Okay to discharge home. Patient advised modified pelvic rest until next routine prenatal visit at 32 weeks and instructed to push fluids. Encouraged her to call if persistent/worsening cramping by morning. Otherwise, follow-up in clinic this week. Vistaril sent to promote rest.     Encouraged maternity band for pelvic/back support, attention to body mechanics, Tylenol/heat prn.    Teaching done r/t to s/s of labor, SROM, decreased fetal movement, comfort measures in third trimester.  Instructed to please refer to the discharge handouts, the RN triage line or on-call CNM for any questions or concerns.  Pt verbalizes understanding and agreement with current plan of care.    ARIAN Conte CNM

## 2021-08-22 NOTE — PLAN OF CARE
THEO Pretty informed of patient arrival and assessment including the following:    Reason for maternal/fetal assessment back pain, abdominal pain, pelvic pressure. Pt reports back pain that began last evening, abdominal cramping and pelvic pressure that began today.  Reports she was feeling contractions every 5 minutes when walking around the mall today. Fetal status normal baseline, moderate variability, accelerations present and no decelerations. UA and Wet Prep WNL.  Unable to send FFN as patient had intercourse this morning.  Fern in pending at this time.  Plan per provider/orders received for ultrasound with BPP to r/o abruption and check PAGE.

## 2021-08-22 NOTE — PLAN OF CARE
Data: [facility] to [facility]: 2021  5:26 PM.  Reason for maternal/fetal assessment is abdominal cramping and back pain. Patient reports she had back pain that began last evening, rated 8/10 for pain.  Today she began feeling cramping in her lower abdomen and pelvic pressure, worse when standing.  Patient is a .  Prenatal record reviewed. Pregnancy has been uncomplicated..  Gestational Age 30w5d. VSS. Fetal movement active. Patient denies vaginal bleeding, but does report her underwear has felt more wet. Support person Faustino is present.   Action: Verbal consent for EFM. Triage assessment completed. Bill of rights reviewed.  Response: Patient verbalized agreement with plan. Verbal orders received from [facility] Shalonda Lee for UA, wet prep, FFN, and SVE.  Anne comes on at 1800, will update with results.

## 2021-08-22 NOTE — TELEPHONE ENCOUNTER
OB Triage Call      Is patient's OB/Midwife with the [facility] or [facility]? [facility]- Proceed with triage     Reason for call: Patient calling - says she is 30 weeks pregnant.  Has had cramping in abdomen and back all night. Comes and goes for last 2-3 hours.  Every 5-20 minutes.  Less than 6 per hour for last hour.  A lot of pressure in lower abdomen for last few hours.    Assessment: triaged to disposition of Go to L & D Now (or PCP Triage). Estimated Date of Delivery: Oct 26, 2021       Plan: 4:26 pm paged on-call midwife - Shalonda Lee CNM ([facility]).  Second page placed 4:45 pm.      4:50 pm called patient to check on her.  Patient state the on-call  Midwife called her back directly and advised her to go to L & D now.  She says the midwife called L & D to assure there is room and alerted them she is coming.    Patient plans to deliver at [facility]     Patient's primary OB Provider is midwives.      Is patient's primary OB from [facility]? No- Patient's primary OB provider is a Midwife. Is patient in labor or is Midwife consult needed? Yes- Paged on-call midwife for patient's primary OB clinic (refer to where patient is seen as midwives may go to multiple locations) [facility] to call FNA back at [phone].  Call returned at (no return call from midwife.  Triager called patient and patient advised that midwife contacted her directly) and advised on triage assessment. Does midwife recommend L&D evaluation? Yes-  Labor and delivery at [facility] ([phone]) notified of patient's pending arrival. Patient notified to go to L&D by Midwife         30w5d    Estimated Date of Delivery: Oct 26, 2021        OB History    Para Term  AB Living   3 0 0 0 2 0   SAB TAB Ectopic Multiple Live Births   1 0 0 0 0      # Outcome Date GA Lbr Isidro/2nd Weight Sex Delivery Anes PTL Lv   3 Current            2 SAB  4w0d          1 AB  6w0d    AB, COMPLETE          No results found for: GBS       Hiral BERNABE  "PATI Lee 21 4:21 PM  [facility] Nurse Advisor    Reason for Disposition    Increased pressure in pelvic area    [1] Lower back discomfort AND [2] not relieved by rest    Additional Information    Negative: Shock suspected (e.g., cold/pale/clammy skin, too weak to stand, low BP, rapid pulse)    Negative: Difficult to awaken or acting confused (e.g., disoriented, slurred speech)    Negative: Passed out (i.e., lost consciousness, collapsed and was not responding)    Negative: [1] SEVERE abdominal pain (e.g., excruciating) AND [2] constant AND [3] present > 1 hour    Negative: Severe bleeding (e.g., continuous red blood from vagina, or large blood clots)    Negative: Umbilical cord hanging out of the vagina (shiny, white, curled appearance, \"like telephone cord\")    Negative: Uncontrollable urge to push (i.e., feels like baby is coming out now)    Negative: Can see baby    Negative: Sounds like a life-threatening emergency to the triager    Negative: MODERATE-SEVERE abdominal pain    Negative: Contractions < 10 minutes apart for 1 hour (i.e., 6 or more contractions an hour)    Negative: [1] Contractions > 10 minutes apart AND [2] persist > 24 hours AND [3] no improvement using CARE ADVICE    Negative: [1] Contractions AND [2] any vaginal bleeding (including: red blood, clots, spotting, or pink/brown mucous)    Negative: Vaginal bleeding  (Exception: brief spotting after intercourse or pelvic exam, or slight pinkish or brownish mucous discharge)    Negative: Leakage of fluid from vagina    Negative: [1] Baby moving less today (e.g., kick count < 5 in 1 hour or < 10 in 2 hours) AND [2] pregnant 23 or more weeks    Negative: No movement of baby for 8 hours    Negative: Severe headache or headache that won't go away    Negative: New blurred vision or vision changes    Negative: Fever > 100.4 F (38.0 C)    Protocols used: PREGNANCY - LABOR - [id number]      "

## 2021-08-23 ENCOUNTER — NURSE TRIAGE (OUTPATIENT)
Dept: NURSING | Facility: CLINIC | Age: 21
End: 2021-08-23

## 2021-08-23 NOTE — PLAN OF CARE
Data: Patient assessed in [facility] for [facility].  Cervical exam closed and thick.  Membranes intact.  Irritability via toco.  Action:  Presumed adequate fetal oxygenation documented (see flow record). Discharge instructions reviewed.  Patient instructed to report change in fetal movement, vaginal leaking of fluid or bleeding, abdominal pain, or any concerns related to the pregnancy to her nurse/physician.    Response: Orders to discharge home per .  Patient verbalized understanding of education and verbalized agreement with plan. Discharged to home at 2120.

## 2021-08-23 NOTE — PROVIDER NOTIFICATION
08/22/21 2020   Provider Notification   Provider Name/Title THEO Rodríguez   Method of Notification Phone   Request Evaluate - Remote;Evaluate in Person   Notification Reason Lab/Diagnostic Study;Uterine Activity;Other (Comment)     CNM called, reviewed US final result and negative fern. Pt was feeling less discomfort initially when she came back from US but has been feeling some painful contractions in the last 20 min, toco picking up every 1.5-2.5 min, pt not feeling all.    CNM called into pt room and spoke with patient via phone. RN unable to hear entirety of conversation as speakerphone was not available. All questions and concerns addressed per patient. RN rechecked SVE per CNM, still closed, thick, anterior, -2.    Per CNM, discharge to home, pt to follow up in clinic this week, will place orders and write for Vistaril for home use.

## 2021-08-24 NOTE — TELEPHONE ENCOUNTER
8 hour contractions last night. Today around 4:30pm, less contractions but lots of abdominal/back pain. Better with laying down and doing well with drinking water.  Seeing [facility].     OB Triage Call      Is patient's OB/Midwife with the [facility] or [facility]? [facility]- Proceed with triage     Reason for call: cramping/contractions in abdomen and back    Assessment: ER or PCP triage    Plan: FNA will page on call    Patient plans to deliver at [facility]     Patient's primary OB Provider is [facility].      Is patient's primary OB from [place]? No- Patient's primary OB provider is a Midwife. Is patient in labor or is Midwife consult needed? Yes- Paged on-call midwife for patient's primary OB clinic (refer to where patient is seen as midwives may go to multiple locations) [facility] to call FNA back at .now.  Call returned at 7:43PM and advised on triage assessment. Does midwife recommend L&D evaluation? No- Per midwife on-call they will call patient and assess. .         30w6d    Estimated Date of Delivery: Oct 26, 2021        OB History    Para Term  AB Living   3 0 0 0 2 0   SAB TAB Ectopic Multiple Live Births   1 0 0 0 0      # Outcome Date GA Lbr Isidro/2nd Weight Sex Delivery Anes PTL Lv   3 Current            2 SAB  4w0d          1 AB  6w0d    AB, COMPLETE          No results found for: GBS       Cyndi Johnson RN 21 7:34 PM  [facility] Nurse Advisor        Reason for Disposition    Increased pressure in pelvic area    Additional Information    Negative: MODERATE-SEVERE abdominal pain    Negative: Contractions < 10 minutes apart for 1 hour (i.e., 6 or more contractions an hour)    Negative: [1] Contractions > 10 minutes apart AND [2] persist > 24 hours AND [3] no improvement using CARE ADVICE    Negative: [1] Contractions AND [2] any vaginal bleeding (including: red blood, clots, spotting, or pink/brown mucous)    Negative: Vaginal bleeding   "(Exception: brief spotting after intercourse or pelvic exam, or slight pinkish or brownish mucous discharge)    Negative: Leakage of fluid from vagina    Negative: [1] Baby moving less today (e.g., kick count < 5 in 1 hour or < 10 in 2 hours) AND [2] pregnant 23 or more weeks    Negative: No movement of baby for 8 hours    Negative: Severe headache or headache that won't go away    Negative: New blurred vision or vision changes    Negative: Passed out (i.e., lost consciousness, collapsed and was not responding)    Negative: Shock suspected (e.g., cold/pale/clammy skin, too weak to stand, low BP, rapid pulse)    Negative: Difficult to awaken or acting confused (e.g., disoriented, slurred speech)    Negative: [1] SEVERE abdominal pain (e.g., excruciating) AND [2] constant AND [3] present > 1 hour    Negative: Severe bleeding (e.g., continuous red blood from vagina, or large blood clots)    Negative: Umbilical cord hanging out of the vagina (shiny, white, curled appearance, \"like telephone cord\")    Negative: Uncontrollable urge to push (i.e., feels like baby is coming out now)    Negative: Can see baby    Negative: Sounds like a life-threatening emergency to the triager    Negative: Fever > 100.4 F (38.0 C)    Protocols used: PREGNANCY - LABOR - [id number]      "

## 2021-08-24 NOTE — TELEPHONE ENCOUNTER
Called patient back to discuss symptoms. Lorrie reports that since  ~ 1600 she has noted some abdominal and back cramps. Baby has been active. No vaginal bleeding or leaking of fluid. Abdominal cramps not very painful. Back cramps are more uncomfortable. Cramps resolve when she sits or lies down. [facility] for similar symptoms and discharged to home as cervix was not changed. Unable to do Ffn at that time as patient had had recent intercourse. Patient reports her symptoms were more intense yesterday. Advised patient to drink large glass of water and rest for remainder of evening. If cramps return, patient advised to call me back. Patient verbalizes understanding.    ARIAN Yang, CNM

## 2021-08-25 ENCOUNTER — PRENATAL OFFICE VISIT (OUTPATIENT)
Dept: OBGYN | Facility: CLINIC | Age: 21
End: 2021-08-25
Payer: COMMERCIAL

## 2021-08-25 VITALS — DIASTOLIC BLOOD PRESSURE: 60 MMHG | BODY MASS INDEX: 22.55 KG/M2 | SYSTOLIC BLOOD PRESSURE: 102 MMHG | WEIGHT: 139.7 LBS

## 2021-08-25 DIAGNOSIS — N94.89 UTERINE CRAMPING: Primary | ICD-10-CM

## 2021-08-25 DIAGNOSIS — M54.50 LOW BACK PAIN, UNSPECIFIED BACK PAIN LATERALITY, UNSPECIFIED CHRONICITY, UNSPECIFIED WHETHER SCIATICA PRESENT: ICD-10-CM

## 2021-08-25 LAB
FFN SPECIMEN INTEGRITY: NORMAL
FIBRONECTIN FETAL VAG QL: NEGATIVE

## 2021-08-25 PROCEDURE — 82731 ASSAY OF FETAL FIBRONECTIN: CPT | Performed by: ADVANCED PRACTICE MIDWIFE

## 2021-08-25 PROCEDURE — 99213 OFFICE O/P EST LOW 20 MIN: CPT | Performed by: ADVANCED PRACTICE MIDWIFE

## 2021-08-25 PROCEDURE — 87086 URINE CULTURE/COLONY COUNT: CPT | Performed by: ADVANCED PRACTICE MIDWIFE

## 2021-08-25 RX ORDER — HYDROXYZINE HYDROCHLORIDE 50 MG/1
TABLET, FILM COATED ORAL
COMMUNITY
Start: 2021-08-23 | End: 2021-08-25

## 2021-08-25 NOTE — NURSING NOTE
"Chief Complaint   Patient presents with     Prenatal Care       Initial /60 (BP Location: Left arm, Cuff Size: Adult Regular)   Wt 63.4 kg (139 lb 11.2 oz)   LMP 2021 (Approximate)   Breastfeeding No   BMI 22.55 kg/m   Estimated body mass index is 22.55 kg/m  as calculated from the following:    Height as of 21: 1.676 m (5' 6\").    Weight as of this encounter: 63.4 kg (139 lb 11.2 oz).  BP completed using cuff size: regular    Questioned patient about current smoking habits.  Pt. has never smoked.          "

## 2021-08-25 NOTE — PATIENT INSTRUCTIONS
Prenatal Chiropractic  [email or web address]    SIGNS OF  LABOR    Labor is  if it happens more than three weeks before your due date.    It can be hard to know if you are in labor, since the symptoms can be like the normal feelings of pregnancy.  Often, the only difference is the symptoms increase or they don't go away.     Signs of  labor can include:      Contractions which can feel like period cramps or gas pain.  You may feel it in the lower part of your abdomen, in your back, or as a pressure feeling in your bottom.  It is often regular, coming every 5 or 10 minutes, and  lasting about 30-60 seconds. Some contractions are normal during pregnancy ([place] currie contractions) but if you are feeling more than 5-6 in one hour that is NOT normal    If this occurs empty your bladder, then drink 2-3 glasses of water, eat a snack, and lay down on your left side. Put your hand on your abdomen to count the contractions.  If after one hour of resting you have still had 5-6 contractions call your clinic right away.      If you feel a pop, gush, or trickle of fluid it may mean that your bag of water has broken and you should contact the clinic       You may also experience loose stools and/or rectal pressure       Listen to your body, if something doesn't seem right please call us at the clinic    Risk Factors      Previous  delivery    Bacterial Vaginosis- if you notice a fishy smell to your discharge or experience vaginal itching/discomfort you should be evaluated for infection    Smoking    Drug abuse    Adolescent (teen) pregnancy or advanced maternal age (AMA) age 35 and over    Dehydration (this may not cause  labor but it can cause contractions)    If you think you are in  labor we may do some lab testing in the clinic or send you to the hospital for evaluation    Please call us if you are concerned you are in  labor.    [facility]  [phone]

## 2021-08-25 NOTE — PROGRESS NOTES
S: Lorrie presents for a non-routine prenatal visit as follow-up to her MAC assessment three days ago. She did have another run of low pelvic cramping on Monday during the day that lasted two hours and spoke with the on-call CNM at that time. She does report ongoing cramping, primarily in her back, and mostly at night as well as persistent low pelvic pressure. She has not needed to take the Vistaril that was provided Sunday night. She is currently not experiencing any cramping. Baby remains active. She is not having any LOF or vaginal bleeding. She does endorse mild nausea and dizziness today but has been eating normally and is staying hydrated.     O: Vitals: /60 (BP Location: Left arm, Cuff Size: Adult Regular)   Wt 63.4 kg (139 lb 11.2 oz)   LMP 01/25/2021 (Approximate)   Breastfeeding No   BMI 22.55 kg/m    BMI= Body mass index is 22.55 kg/m .  Exam:  Constitutional: Healthy, alert and no distress  Respiratory: Respirations even and unlabored  Gastrointestinal: Abdomen soft, non-tender. Fundus measures appropriate for gestational age. Fetal heart tones heard without difficulty and within normal limits. Cephalic per Leopold's, palpated low in pelvis.  : Normal external genitalia without lesions. VE: closed/thick/-2/medium/mid. Vertex palpated in lower uterine segment with fetal kicks in upper abdomen in response to stimulation. fFN collected prior to VE.  Psychiatric: Mentation appears normal and affect normal/bright    A/P:  (N94.89) Uterine cramping  (primary encounter diagnosis)  Plan: Fetal fibronectin, Urine Culture  - fFN not able to be done Sunday due to recent intercourse, so collected today  - UA negative Sunday but will send urine for culture to evaluate for asymptomatic bacteriuria  - Reviewed FK    (M54.5) Low back pain, unspecified back pain laterality, unspecified chronicity, unspecified whether sciatica present  Plan: Urine Culture, Neck/Shoulder/Back/Abdomen Order        for DME -  ONLY FOR DME  - Discussed physiologic pelvic pain/pressure associated with normal third trimester discomforts and fetal postitioning.   - Encouraged maternity belt for pelvic/back support for the remainder of pregnancy  - Discussed chiropractic care with practitioner who specializes in prenatal adjustments. Resources provided in AVS.      Will notify patient when labs resulted and adjust plan of care as indicated.  Encouraged patient to call with any questions or concerns.  Return to clinic in 1 week for routine prenatal visit.      Susana Pretty, MARY, APRN, CNM

## 2021-08-26 LAB — BACTERIA UR CULT: NO GROWTH

## 2021-08-30 ENCOUNTER — PRENATAL OFFICE VISIT (OUTPATIENT)
Dept: OBGYN | Facility: CLINIC | Age: 21
End: 2021-08-30
Payer: COMMERCIAL

## 2021-08-30 VITALS — BODY MASS INDEX: 22.34 KG/M2 | SYSTOLIC BLOOD PRESSURE: 100 MMHG | DIASTOLIC BLOOD PRESSURE: 64 MMHG | WEIGHT: 138.4 LBS

## 2021-08-30 DIAGNOSIS — O26.843 UTERINE SIZE-DATE DISCREPANCY IN THIRD TRIMESTER: ICD-10-CM

## 2021-08-30 DIAGNOSIS — Z34.83 ENCOUNTER FOR SUPERVISION OF OTHER NORMAL PREGNANCY, THIRD TRIMESTER: Primary | ICD-10-CM

## 2021-08-30 PROCEDURE — 99207 PR PRENATAL VISIT: CPT | Performed by: ADVANCED PRACTICE MIDWIFE

## 2021-08-30 NOTE — PATIENT INSTRUCTIONS
SIGNS OF  LABOR    Labor is  if it happens more than three weeks before your due date.    It can be hard to know if you are in labor, since the symptoms can be like the normal feelings of pregnancy.  Often, the only difference is the symptoms increase or they don't go away.     Signs of  labor can include:      Contractions which can feel like period cramps or gas pain.  You may feel it in the lower part of your abdomen, in your back, or as a pressure feeling in your bottom.  It is often regular, coming every 5 or 10 minutes, and  lasting about 30-60 seconds. Some contractions are normal during pregnancy ([place] currie contractions) but if you are feeling more than 5-6 in one hour that is NOT normal    If this occurs empty your bladder, then drink 2-3 glasses of water, eat a snack, and lay down on your left side. Put your hand on your abdomen to count the contractions.  If after one hour of resting you have still had 5-6 contractions call your clinic right away.      If you feel a pop, gush, or trickle of fluid it may mean that your bag of water has broken and you should contact the clinic       You may also experience loose stools and/or rectal pressure       Listen to your body, if something doesn't seem right please call us at the clinic    Risk Factors      Previous  delivery    Bacterial Vaginosis- if you notice a fishy smell to your discharge or experience vaginal itching/discomfort you should be evaluated for infection    Smoking    Drug abuse    Adolescent (teen) pregnancy or advanced maternal age (AMA) age 35 and over    Dehydration (this may not cause  labor but it can cause contractions)    If you think you are in  labor we may do some lab testing in the clinic or send you to the hospital for evaluation    Please call us if you are concerned you are in  labor.    [facility]  [phone]    PREECLAMPSIA SIGNS AND SYMPTOMS    Preeclampsia is a dangerous  "condition that some women develop in the second half of pregnancy. It can also begin after the baby is born.  Preeclampsia causes high blood pressure and can cause problems with many organ systems in your body.  It can also affect the growth of your baby. The exact cause of preeclampsia is unknown, however, there are signs and symptoms to watch for:    -A bad headache that doesn't improve with Tylenol  -Visual changes such as spots, flashes of light, blurry vision  -Pain in the upper right part of your abdomen, especially under the ribs that doesn't go away  -Nausea and/or vomiting  -Feeling extremely tired  -Yellowing of the skin and/or eyes  -Feeling \"not quite right\" or that something is wrong  -An extreme amount of swelling (some swelling in pregnancy is very normal)    If your midwife feels that you are developing preeclampsia, you will have lab tests drawn and will be monitored very closely.     If you are experiencing anyof these symptoms,   Call [facility]   [phone]  Fetal Kick Counts    It is important to know when your baby's movements occur. We often get busy with work and life and do not pay close attention to their movements.        Women typically begin feeling movement between 18-22 weeks of gestation, sometimes it can be earlier or later depending on where your placenta is       Movements usually begin feeling like popping or fluttering and as the baby grows they become more pronounced    Toward the end of pregnancy as the baby gets larger they may not move as much or make as big of movements. Babies have maturing sleep cycles as well as not as much room to move and flip. If you are ever concerned about your baby's movements or have not felt the baby move for a while, we recommend you do a fetal kick count. Prior to starting your count drink a glass of water or juice and eat a snack. Then lay down on your side and begin to count movements.     How to do a Fetal Kick Counts    There are many " different ways to monitor your baby's movements. Movements can range from large jabs to small kicks, or wiggles.  Hiccups count!      Count 10 movements in 2 hours when resting and focusing    Count 10 movements in 12 hours when doing normal activity    We recommend that if movements occur but seem decreased that you should be seen in the clinic or hospital for evaluation within 12 hours. If fetal movement is absent or fetal kick counts are low please contact us right away.    If you ever have any concerns about your baby's movements DO NOT HESITATE to call us, we are here for you!    [facility] Certified Nurse Midwives  [phone]  Pager [phone]

## 2021-08-30 NOTE — PROGRESS NOTES
RICE- rest, ice, compression, elevation  Ace wrap while awake and crutches/nonweightbearing until official x-ray read   Call tomorrow for official x-ray results  Tylenol/Motrin for pain and swelling as needed  Call Ortho today and follow-up with them in the next 1-2 days for further evaluation and treatment     Call [facility] Perdomo to schedule an appointment: [phone]  Or the direct Ortho number at [phone] to schedule an appointment   Go to the ER immediately if any numbness, tingling, weakness, change in intensity or location of pain, calf pain or swelling, other new or concerning symptoms S: Feels well,  Baby active.  Denies uterine cramping, vaginal bleeding or leaking of fluid    Lorrie reports she has not done any childbirth education yet. Encouraged her to follow links provided in initial OB folder to free videos done by Janie. She is planning epidural for pain relief in labor. She states IV pain meds sometimes make her anxious, and she doesn't like nitrous. She plans to use natural family planning for birth control postpartum. She is planning to formula feed. She has not chosen a pediatrician yet but we discussed this today. She is planning to have baby circumcised.     She has been using a belly band which has helped with some of her cramping and pelvic pressure.     O: Vitals: /64 (BP Location: Right arm, Cuff Size: Adult Regular)   Wt 62.8 kg (138 lb 6.4 oz)   LMP 2021 (Approximate)   Breastfeeding No   BMI 22.34 kg/m    BMI= Body mass index is 22.34 kg/m .  Exam:  Constitutional: healthy, alert and no distress  Respiratory: respirations even and unlabored  Gastrointestinal: Abdomen soft, non-tender. Fundus measures small for gestational age. Fetal heart tones hear without difficulty and within normal limits  : Deferred  Psychiatric: mentation appears normal and affect normal/bright  A/P:  1. (Z34.83) Encounter for supervision of other normal pregnancy, third trimester  (primary encounter diagnosis)  - Reviewed fetal movement,  labor, and s/s preeclampsia. Provided with handouts.     2. (O26.843) Uterine size-date discrepancy in third trimester  Plan: US OB >14 Weeks Follow Up  - Measuring 28 cm  At 32 wks, also minimal weight gain this pregnancy  - She agrees to growth US to assess fetal growth       Discussed plans for labor. Discussed patient options for postpartum contraception. Patient is planning natural family planning  Encouraged patient to call with any questions or concerns.  Return to clinic 2 weeks    ARIAN Bal, THEO

## 2021-09-07 ENCOUNTER — ANCILLARY PROCEDURE (OUTPATIENT)
Dept: ULTRASOUND IMAGING | Facility: CLINIC | Age: 21
End: 2021-09-07
Attending: ADVANCED PRACTICE MIDWIFE
Payer: COMMERCIAL

## 2021-09-07 DIAGNOSIS — O26.843 UTERINE SIZE-DATE DISCREPANCY IN THIRD TRIMESTER: ICD-10-CM

## 2021-09-07 PROCEDURE — 76816 OB US FOLLOW-UP PER FETUS: CPT | Performed by: OBSTETRICS & GYNECOLOGY

## 2021-09-13 ENCOUNTER — PRENATAL OFFICE VISIT (OUTPATIENT)
Dept: OBGYN | Facility: CLINIC | Age: 21
End: 2021-09-13
Payer: COMMERCIAL

## 2021-09-13 ENCOUNTER — NURSE TRIAGE (OUTPATIENT)
Dept: NURSING | Facility: CLINIC | Age: 21
End: 2021-09-13

## 2021-09-13 ENCOUNTER — HOSPITAL ENCOUNTER (OUTPATIENT)
Facility: CLINIC | Age: 21
Discharge: HOME | End: 2021-09-14
Attending: ADVANCED PRACTICE MIDWIFE | Admitting: ADVANCED PRACTICE MIDWIFE
Payer: COMMERCIAL

## 2021-09-13 ENCOUNTER — TELEPHONE (OUTPATIENT)
Dept: OBGYN | Facility: CLINIC | Age: 21
End: 2021-09-13

## 2021-09-13 ENCOUNTER — APPOINTMENT (OUTPATIENT)
Dept: ULTRASOUND IMAGING | Facility: CLINIC | Age: 21
End: 2021-09-13
Attending: ADVANCED PRACTICE MIDWIFE
Payer: COMMERCIAL

## 2021-09-13 VITALS — SYSTOLIC BLOOD PRESSURE: 98 MMHG | WEIGHT: 141 LBS | DIASTOLIC BLOOD PRESSURE: 72 MMHG | BODY MASS INDEX: 22.76 KG/M2

## 2021-09-13 VITALS — DIASTOLIC BLOOD PRESSURE: 60 MMHG | RESPIRATION RATE: 16 BRPM | TEMPERATURE: 98 F | SYSTOLIC BLOOD PRESSURE: 108 MMHG

## 2021-09-13 DIAGNOSIS — O26.843 UTERINE SIZE-DATE DISCREPANCY IN THIRD TRIMESTER: ICD-10-CM

## 2021-09-13 DIAGNOSIS — O26.899 CRAMPING AFFECTING PREGNANCY, ANTEPARTUM: ICD-10-CM

## 2021-09-13 DIAGNOSIS — H53.9 VISION CHANGES: ICD-10-CM

## 2021-09-13 DIAGNOSIS — R10.9 CRAMPING AFFECTING PREGNANCY, ANTEPARTUM: ICD-10-CM

## 2021-09-13 DIAGNOSIS — Z34.83 ENCOUNTER FOR SUPERVISION OF OTHER NORMAL PREGNANCY, THIRD TRIMESTER: Primary | ICD-10-CM

## 2021-09-13 PROBLEM — Z36.89 ENCOUNTER FOR TRIAGE IN PREGNANT PATIENT: Status: ACTIVE | Noted: 2021-09-13

## 2021-09-13 LAB
ALBUMIN UR-MCNC: NEGATIVE MG/DL
ALP SERPL-CCNC: 112 U/L (ref 40–150)
AMORPH CRY #/AREA URNS HPF: ABNORMAL /HPF
AMYLASE SERPL-CCNC: 65 U/L (ref 30–110)
ANION GAP SERPL CALCULATED.3IONS-SCNC: 4 MMOL/L (ref 3–14)
APPEARANCE UR: ABNORMAL
BILIRUB SERPL-MCNC: 0.2 MG/DL (ref 0.2–1.3)
BILIRUB UR QL STRIP: NEGATIVE
BUN SERPL-MCNC: 8 MG/DL (ref 7–30)
CALCIUM SERPL-MCNC: 8.2 MG/DL (ref 8.5–10.1)
CHLORIDE BLD-SCNC: 110 MMOL/L (ref 94–109)
CO2 SERPL-SCNC: 25 MMOL/L (ref 20–32)
COLOR UR AUTO: YELLOW
CREAT SERPL-MCNC: 0.61 MG/DL (ref 0.52–1.04)
CREAT UR-MCNC: 124 MG/DL
ERYTHROCYTE [DISTWIDTH] IN BLOOD BY AUTOMATED COUNT: 12.7 % (ref 10–15)
GFR SERPL CREATININE-BSD FRML MDRD: >90 ML/MIN/1.73M2
GLUCOSE BLD-MCNC: 98 MG/DL (ref 70–99)
GLUCOSE UR STRIP-MCNC: NEGATIVE MG/DL
HCT VFR BLD AUTO: 35.1 % (ref 35–47)
HGB BLD-MCNC: 11.4 G/DL (ref 11.7–15.7)
HGB UR QL STRIP: NEGATIVE
KETONES UR STRIP-MCNC: NEGATIVE MG/DL
LEUKOCYTE ESTERASE UR QL STRIP: NEGATIVE
LIPASE SERPL-CCNC: 95 U/L (ref 73–393)
MCH RBC QN AUTO: 31.8 PG (ref 26.5–33)
MCHC RBC AUTO-ENTMCNC: 32.5 G/DL (ref 31.5–36.5)
MCV RBC AUTO: 98 FL (ref 78–100)
NITRATE UR QL: NEGATIVE
PH UR STRIP: 6.5 [PH] (ref 5–7)
PLATELET # BLD AUTO: 212 10E3/UL (ref 150–450)
POTASSIUM BLD-SCNC: 3.6 MMOL/L (ref 3.4–5.3)
PROT UR-MCNC: 0.18 G/L
PROT/CREAT 24H UR: 0.15 G/G CR (ref 0–0.2)
RBC # BLD AUTO: 3.59 10E6/UL (ref 3.8–5.2)
RBC #/AREA URNS AUTO: ABNORMAL /HPF
SODIUM SERPL-SCNC: 139 MMOL/L (ref 133–144)
SP GR UR STRIP: 1.02 (ref 1–1.03)
UROBILINOGEN UR STRIP-ACNC: 1 E.U./DL
WBC # BLD AUTO: 9 10E3/UL (ref 4–11)
WBC #/AREA URNS AUTO: ABNORMAL /HPF

## 2021-09-13 PROCEDURE — 36415 COLL VENOUS BLD VENIPUNCTURE: CPT | Performed by: ADVANCED PRACTICE MIDWIFE

## 2021-09-13 PROCEDURE — 76815 OB US LIMITED FETUS(S): CPT

## 2021-09-13 PROCEDURE — 84075 ASSAY ALKALINE PHOSPHATASE: CPT | Performed by: ADVANCED PRACTICE MIDWIFE

## 2021-09-13 PROCEDURE — 82150 ASSAY OF AMYLASE: CPT | Performed by: ADVANCED PRACTICE MIDWIFE

## 2021-09-13 PROCEDURE — 83690 ASSAY OF LIPASE: CPT | Performed by: ADVANCED PRACTICE MIDWIFE

## 2021-09-13 PROCEDURE — 84450 TRANSFERASE (AST) (SGOT): CPT | Performed by: ADVANCED PRACTICE MIDWIFE

## 2021-09-13 PROCEDURE — 76700 US EXAM ABDOM COMPLETE: CPT

## 2021-09-13 PROCEDURE — 80048 BASIC METABOLIC PNL TOTAL CA: CPT | Performed by: ADVANCED PRACTICE MIDWIFE

## 2021-09-13 PROCEDURE — 99207 PR PRENATAL VISIT: CPT | Performed by: ADVANCED PRACTICE MIDWIFE

## 2021-09-13 PROCEDURE — 250N000013 HC RX MED GY IP 250 OP 250 PS 637: Performed by: ADVANCED PRACTICE MIDWIFE

## 2021-09-13 PROCEDURE — 81001 URINALYSIS AUTO W/SCOPE: CPT | Performed by: ADVANCED PRACTICE MIDWIFE

## 2021-09-13 PROCEDURE — 84460 ALANINE AMINO (ALT) (SGPT): CPT | Performed by: ADVANCED PRACTICE MIDWIFE

## 2021-09-13 PROCEDURE — 85027 COMPLETE CBC AUTOMATED: CPT | Performed by: ADVANCED PRACTICE MIDWIFE

## 2021-09-13 PROCEDURE — 82247 BILIRUBIN TOTAL: CPT | Performed by: ADVANCED PRACTICE MIDWIFE

## 2021-09-13 PROCEDURE — 87086 URINE CULTURE/COLONY COUNT: CPT | Performed by: ADVANCED PRACTICE MIDWIFE

## 2021-09-13 PROCEDURE — 84156 ASSAY OF PROTEIN URINE: CPT | Performed by: ADVANCED PRACTICE MIDWIFE

## 2021-09-13 RX ORDER — NALOXONE HYDROCHLORIDE 0.4 MG/ML
0.2 INJECTION, SOLUTION INTRAMUSCULAR; INTRAVENOUS; SUBCUTANEOUS
Status: DISCONTINUED | OUTPATIENT
Start: 2021-09-13 | End: 2021-09-14 | Stop reason: HOSPADM

## 2021-09-13 RX ORDER — NALOXONE HYDROCHLORIDE 0.4 MG/ML
0.4 INJECTION, SOLUTION INTRAMUSCULAR; INTRAVENOUS; SUBCUTANEOUS
Status: DISCONTINUED | OUTPATIENT
Start: 2021-09-13 | End: 2021-09-14 | Stop reason: HOSPADM

## 2021-09-13 RX ORDER — LIDOCAINE 40 MG/G
CREAM TOPICAL
Status: DISCONTINUED | OUTPATIENT
Start: 2021-09-13 | End: 2021-09-14 | Stop reason: HOSPADM

## 2021-09-13 RX ORDER — OXYCODONE HYDROCHLORIDE 5 MG/1
5 TABLET ORAL EVERY 4 HOURS PRN
Status: DISCONTINUED | OUTPATIENT
Start: 2021-09-13 | End: 2021-09-14 | Stop reason: HOSPADM

## 2021-09-13 RX ADMIN — OXYCODONE HYDROCHLORIDE 5 MG: 5 TABLET ORAL at 21:38

## 2021-09-13 NOTE — NURSING NOTE
"Chief Complaint   Patient presents with     Prenatal Care     33w 6d c/o contractions daily-min 10 a day, also headaches, blurred vision and black spots-denies swelling       Initial BP 98/72 (BP Location: Left arm, Cuff Size: Adult Regular)   Wt 64 kg (141 lb)   LMP 2021 (Approximate)   BMI 22.76 kg/m   Estimated body mass index is 22.76 kg/m  as calculated from the following:    Height as of 21: 1.676 m (5' 6\").    Weight as of this encounter: 64 kg (141 lb).  BP completed using cuff size: regular    Questioned patient about current smoking habits.  Pt. has never smoked.          The following HM Due: NONE    "

## 2021-09-13 NOTE — PROGRESS NOTES
"S: Feels well,  Baby active.  Denies  vaginal bleeding or leaking of fluid. Reports good fetal movement.     Lorrie reports occasional cramping and pelvic pressure. She states it typically happens at the end of the day. She reports \"just when I think I should go to the hospital, they go away\". She reports sometimes having 6-10 ctx an hour, then they resolve. We discussed PTL v dewey currie contractions at length. She is not having any ctx today. I advised her to call and go to LD for rule out labor the next time she has regular contractions every 10 mins. She states understanding. Of note, she had a negative FFN 2 wks ago.     She also reports visual changes that started over the weekend. Denies headache, ruq or epigastric pain. Bps have been stable in this pregnancy. She reports \"floaters\" and blurred vision. We discussed visual changes can be concerning for development of preeclampsia. Reviewed s/s or preeclampsia at length, and advised to present to LD if this happens again. PIH labs to be collected today.     Lorrie is asking about elective induction. Her  is a  and often goes 3-4 hrs away in [place]. She worries she will go into labor and he won't be able to make it. She would have her mom present at delivery if her  was not able to come. We discussed elective IOL prior to 41 wks is not typically offered. We also discussed that IOL with unripe cervix can increase risk of c/s and may take several days. Discussed that in rare cases for psychosocial factors and induction could be considered but typically has to be approved by OB chair.     O: Vitals: BP 98/72 (BP Location: Left arm, Cuff Size: Adult Regular)   Wt 64 kg (141 lb)   LMP 01/25/2021 (Approximate)   BMI 22.76 kg/m    BMI= Body mass index is 22.76 kg/m .  Exam:  Constitutional: healthy, alert and no distress  Respiratory: respirations even and unlabored  Gastrointestinal: Abdomen soft, non-tender. Fundus measures appropriate " for gestational age. Fetal heart tones hear without difficulty and within normal limits  : Deferred  Psychiatric: mentation appears normal and affect normal/bright  A/P:    1. (Z34.83) Encounter for supervision of other normal pregnancy, third trimester  (primary encounter diagnosis)      2. (H53.9) Vision changes  Plan: CBC with platelets, AST, ALT, Protein  random         urine with Creat Ratio  - Preeclampsia s/s discussed at length. Advised to present to LD for evaluation if she develops visual changes again    3. (O26.899,  R10.9) Cramping affecting pregnancy, antepartum  Plan: UA with Microscopic, Urine Culture  - PTL precautions reviewed, present to LD with regular contractions or 6ctx/hr    4. (O26.843) Uterine size-date discrepancy in third trimester  Growth US ordered at 32wk visit  EFW 41%, 4lb 8oz  Measuring 3cm behind today but good interval growth and reassuring EFW on last US       Discussed GBS screen for next visit. Discussed plans for labor.     Encouraged patient to call with any questions or concerns.  Return to clinic 2 weeks    ARIAN Bal, THEO

## 2021-09-13 NOTE — PATIENT INSTRUCTIONS
SIGNS OF  LABOR    Labor is  if it happens more than three weeks before your due date.    It can be hard to know if you are in labor, since the symptoms can be like the normal feelings of pregnancy.  Often, the only difference is the symptoms increase or they don't go away.     Signs of  labor can include:      Contractions which can feel like period cramps or gas pain.  You may feel it in the lower part of your abdomen, in your back, or as a pressure feeling in your bottom.  It is often regular, coming every 5 or 10 minutes, and  lasting about 30-60 seconds. Some contractions are normal during pregnancy (Stoddard currie contractions) but if you are feeling more than 5-6 in one hour that is NOT normal    If this occurs empty your bladder, then drink 2-3 glasses of water, eat a snack, and lay down on your left side. Put your hand on your abdomen to count the contractions.  If after one hour of resting you have still had 5-6 contractions call your clinic right away.      If you feel a pop, gush, or trickle of fluid it may mean that your bag of water has broken and you should contact the clinic       You may also experience loose stools and/or rectal pressure       Listen to your body, if something doesn't seem right please call us at the clinic    Risk Factors      Previous  delivery    Bacterial Vaginosis- if you notice a fishy smell to your discharge or experience vaginal itching/discomfort you should be evaluated for infection    Smoking    Drug abuse    Adolescent (teen) pregnancy or advanced maternal age (AMA) age 35 and over    Dehydration (this may not cause  labor but it can cause contractions)    If you think you are in  labor we may do some lab testing in the clinic or send you to the hospital for evaluation    Please call us if you are concerned you are in  labor.    [facility]  [phone]    PREECLAMPSIA SIGNS AND SYMPTOMS    Preeclampsia is a dangerous  "condition that some women develop in the second half of pregnancy. It can also begin after the baby is born.  Preeclampsia causes high blood pressure and can cause problems with many organ systems in your body.  It can also affect the growth of your baby. The exact cause of preeclampsia is unknown, however, there are signs and symptoms to watch for:    -A bad headache that doesn't improve with Tylenol  -Visual changes such as spots, flashes of light, blurry vision  -Pain in the upper right part of your abdomen, especially under the ribs that doesn't go away  -Nausea and/or vomiting  -Feeling extremely tired  -Yellowing of the skin and/or eyes  -Feeling \"not quite right\" or that something is wrong  -An extreme amount of swelling (some swelling in pregnancy is very normal)    If your midwife feels that you are developing preeclampsia, you will have lab tests drawn and will be monitored very closely.     If you are experiencing anyof these symptoms,   Call [facility]   [phone]    "

## 2021-09-14 PROBLEM — M54.9 BACK PAIN AFFECTING PREGNANCY IN THIRD TRIMESTER: Status: ACTIVE | Noted: 2021-09-14

## 2021-09-14 PROBLEM — O99.891 BACK PAIN AFFECTING PREGNANCY IN THIRD TRIMESTER: Status: ACTIVE | Noted: 2021-09-14

## 2021-09-14 LAB
ALT SERPL W P-5'-P-CCNC: 16 U/L (ref 0–50)
AST SERPL W P-5'-P-CCNC: 10 U/L (ref 0–45)
BACTERIA UR CULT: NO GROWTH

## 2021-09-14 PROCEDURE — G0463 HOSPITAL OUTPT CLINIC VISIT: HCPCS

## 2021-09-14 PROCEDURE — 99214 OFFICE O/P EST MOD 30 MIN: CPT | Mod: 24 | Performed by: ADVANCED PRACTICE MIDWIFE

## 2021-09-14 NOTE — DISCHARGE INSTRUCTIONS
Discharge Instruction for Undelivered Patients      You were seen for: abdominal pain  We Consulted: Portia Bhardwaj CNM  You had (Test or Medicine): abdominal and fetal ultrasound, basic metabolic panel, uterine and fetal monitoring, sterile vaginal exam     Diet:   Drink 8 to 12 glasses of liquids (milk, juice, water) every day.  You may eat meals and snacks.     Activity:  Count fetal kicks everyday (see handout)  Call your doctor or nurse midwife if your baby is moving less than usual.     Call your provider if you notice:  Swelling in your face or increased swelling in your hands or legs.  Headaches that are not relieved by Tylenol (acetaminophen).  Changes in your vision (blurring: seeing spots or stars.)  Nausea (sick to your stomach) and vomiting (throwing up).   Weight gain of 5 pounds or more per week.  Heartburn that doesn't go away.  Signs of bladder infection: pain when you urinate (use the toilet), need to go more often and more urgently.  The bag of hernandez (rupture of membranes) breaks, or you notice leaking in your underwear.  Bright red blood in your underwear.  Abdominal (lower belly) or stomach pain.  For first baby: Contractions (tightening) less than 5 minutes apart for one hour or more.  Second (plus) baby: Contractions (tightening) less than 10 minutes apart and getting stronger.  *If less than 34 weeks: Contractions (tightening) more than 6 times in one hour.  Increase or change in vaginal discharge (note the color and amount)    Follow-up:  As scheduled in the clinic

## 2021-09-14 NOTE — PROVIDER NOTIFICATION
09/14/21 0035   Provider Notification   Provider Name/Title THEO Pearce   Method of Notification At Bedside   Request Evaluate in Person   Notification Reason Status Update     CNM at bedside to discuss POC, lab work and imaging normal, offered OB consult in clinic, pt declines for now, plan to discharge to home and follow up in clinic as scheduled.

## 2021-09-14 NOTE — PLAN OF CARE
Data: Patient assessed in the [facility] for abdominal pain.  Cervical exam closed.  Membranes intact.  Contractions not present.  Action:  Presumed adequate fetal oxygenation documented (see flow record). Discharge instructions reviewed.  Patient instructed to report change in fetal movement, vaginal leaking of fluid or bleeding, abdominal pain, or any concerns related to the pregnancy to her nurse/physician.    Response: Orders to discharge home per .  Patient verbalized understanding of education and verbalized agreement with plan. Discharged to home at 0045.

## 2021-09-14 NOTE — PROGRESS NOTES
MATERNAL ASSESSMENT CENTER CNM TRIAGE NOTE    Nicollette M Armijos is a 21 year old  with and IUP at 33w6d who presents with complaint of acute pain/cramping that is in her lower back and in abdomen.  The pain is mid to lower back and around her lower abdomen. Started around 630-645 this evening.  Does not feel like the dewey currie she has had before or like contractions. Worse [place] [facility] [place] She does mention hx of ovarian cyst and that those were about as painful as what she is having.   She denies vagina bleeding, leaking, HA or s/s preeclampsia. Denies nausea.  Baby is active.   She was seen in clinic today and Pre-e labs were drawn. Liver panel is pending but plates and C/P ratio are WNL.   She is here with Faustino, who is supportive.     Patient states baby is active.  Denies ROM   Denies vaginal bleeding  Present OB History at [facility] with the CNMs.     Problems this pregnancy:   1. Anxiety in pregnancy  2. Uterine size discrepancy @ 32 weeks with following ultrasound showing baby in 41% EFW and normal PAGE.   3. Lower back and abdominal pain in third trimester    ROS:  Patient is alert and oriented    PHYSICAL EXAM:  LMP 2021 (Approximate)   EXAM:  Constitutional: healthy, alert and mild distress   Abdomen: Abdomen soft   FHT's 130 with moderate variability  Accelerations: present   Decelerations:  absent        Contractions: Pt is not licha     Abdomen: gravid,   Bloody show: no  Cervix: closed/ 50%/ Mid/ average/ -1  Membranes are intact       ASSESSMENT :   21 year old  with gutierrez IUP 33w6d not in labor with back and lower abdominal pain.   NST reactive  Fetal ultrasound is reassuring tonight  GBS unknown and membranes intact  No cholelithiasis or cholecystitis or hydronephrosis seen on abdominal ultrasound.       PLAN:  Pain helped by medication.   Abdominal and fetal ultrasound reviewed.   BMP, total bilirubin, alk phosphatase, serum amylase and  lipase are all wnl. Liver panel is pending.     Discharge home.Offered OB consult but they wish to wait and see if this occurs again.   Continue routine prenatal care     Teaching done r/t to s/s of labor, SROM, decreased fetal movement, comfort measures in third trimester.  Instructed to please refer to the discharge handouts, the RN triage line or on-call CNM for any questions or concerns.  Pt verbalizes understanding and agreement with current plan of care.    Portia Bhardwaj CNM     EXAM: US OB >14 WEEKS LIMITED WO FETAL MEASUREMENT  LOCATION: [facility]  DATE/TIME: 9/13/2021 9:47 PM     INDICATION: Abdominal and pelvic pain while pregnant.  COMPARISON: 09/07/2021.  TECHNIQUE: Transabdominal and endovaginal ultrasound.     FINDINGS:     Single living fetus, vertex presentation.     HEART RATE: 136 bpm.  SDP 4.2 cm.  PLACENTA: Anterior. No previa.  CERVIX: Not well seen and not measured.     No gross fetal morphologic abnormality. No biometric data obtained.                                                                    IMPRESSION:  Single living intrauterine gestation in vertex position.

## 2021-09-14 NOTE — TELEPHONE ENCOUNTER
"S: Having sharp pain in her chest up her back and she is not sure if her pain is contractions. The last time she went to labor and delivery it was more contractions.  The sharpness is now coming and going and its all over her abdomen not just in her abdomen. Feels like she is constipated but like \" a thousand\".   P: Come to triage for assessment. Pt agreeable and  can drive her in.  L&D notified.   Portia Bhardwaj CNM    "

## 2021-09-14 NOTE — PLAN OF CARE
Data: Patient presented to [facility]: 2021  8:57 PM.  Reason for maternal/fetal assessment is abdominal pain. Patient reports abdominal pain started around 1830, reports constant dull abdominal pain and back pain with intense waves of pain up to 9/10 in upper abdomen which radiate down abdomen slightly.  Patient is a .  Prenatal record reviewed. Pregnancy  has been complicated by has been uncomplicated.  Gestational Age 33w6d. VSS. Fetal movement present. Patient denies leaking of vaginal fluid/rupture of membranes, vaginal bleeding, nausea, vomiting, headache, visual disturbances, epigastric or URQ pain, significant edema. Support person Faustino is present.   Action: Verbal consent for EFM. Triage assessment completed. Bill of rights reviewed.  Response: Patient verbalized agreement with plan. Will contact Dr Portia Bhardwaj with update and further orders.

## 2021-09-14 NOTE — TELEPHONE ENCOUNTER
"OB Triage Call      Is patient's OB/Midwife with the [facility] or [facility]? [facility]- Is patient's primary OB a Midwife? No- Proceed with triage.     Reason for call: \"I am almost 34 weeks pregnant(first baby) and I am having abdominal pain in my belly and into my back. It's the left side to center area.   It's a 9/10.It's actually hard to breathe. It started about an hour ago.\"  Assessment: Denies leakage of fluids, bleeding or other sx  Triaged and advised L&D          Plan: Paged on call mid wife Portia Bhardwaj through page op(Rajat) at 8:20 pm to FNA       Patient's primary OB Provider is Beck.    Patient plans to [facility] ([phone]).  Labor and Deliver notified of patient's pending arrival.  Report given to Izabel BEVERLY .        33w6d    Estimated Date of Delivery: Oct 26, 2021        OB History    Para Term  AB Living   3 0 0 0 2 0   SAB TAB Ectopic Multiple Live Births   1 0 0 0 0      # Outcome Date GA Lbr Isidro/2nd Weight Sex Delivery Anes PTL Lv   3 Current            2 SAB  4w0d          1 AB  6w0d    AB, COMPLETE          No results found for: GBS       Cris Boggs RN 21 8:25 PM  [facility] Nurse Advisor    Reason for Disposition    MODERATE-SEVERE abdominal pain (e.g., interferes with normal activities, awakens from sleep)    Protocols used: PREGNANCY - ABDOMINAL PAIN GREATER THAN 20 WEEKS EGA-A-AH      "

## 2021-09-14 NOTE — PROVIDER NOTIFICATION
09/13/21 2113   Provider Notification   Provider Name/Title THEO Pearce   Method of Notification At Bedside   Request Evaluate in Person     CNM at bedside to meet patient and discuss POC, review of FHT, ordered imaging and pain medication, see CNM note for details.

## 2021-09-18 ENCOUNTER — HEALTH MAINTENANCE LETTER (OUTPATIENT)
Age: 21
End: 2021-09-18

## 2021-09-24 ENCOUNTER — TELEPHONE (OUTPATIENT)
Dept: OBGYN | Facility: CLINIC | Age: 21
End: 2021-09-24

## 2021-09-24 ENCOUNTER — APPOINTMENT (OUTPATIENT)
Dept: ULTRASOUND IMAGING | Facility: CLINIC | Age: 21
End: 2021-09-24
Attending: ADVANCED PRACTICE MIDWIFE
Payer: COMMERCIAL

## 2021-09-24 ENCOUNTER — HOSPITAL ENCOUNTER (OUTPATIENT)
Facility: CLINIC | Age: 21
Discharge: HOME | End: 2021-09-24
Attending: ADVANCED PRACTICE MIDWIFE | Admitting: ADVANCED PRACTICE MIDWIFE
Payer: COMMERCIAL

## 2021-09-24 VITALS
DIASTOLIC BLOOD PRESSURE: 70 MMHG | WEIGHT: 139 LBS | BODY MASS INDEX: 22.34 KG/M2 | HEIGHT: 66 IN | RESPIRATION RATE: 17 BRPM | TEMPERATURE: 98 F | SYSTOLIC BLOOD PRESSURE: 110 MMHG

## 2021-09-24 DIAGNOSIS — O23.599 BACTERIAL VAGINOSIS IN PREGNANCY: Primary | ICD-10-CM

## 2021-09-24 DIAGNOSIS — B96.89 BACTERIAL VAGINOSIS IN PREGNANCY: Primary | ICD-10-CM

## 2021-09-24 LAB
ALT SERPL W P-5'-P-CCNC: 15 U/L (ref 0–50)
AST SERPL W P-5'-P-CCNC: 15 U/L (ref 0–45)
CLUE CELLS: PRESENT
CREAT UR-MCNC: 135 MG/DL
ERYTHROCYTE [DISTWIDTH] IN BLOOD BY AUTOMATED COUNT: 12.6 % (ref 10–15)
HCT VFR BLD AUTO: 32.9 % (ref 35–47)
HGB BLD-MCNC: 10.7 G/DL (ref 11.7–15.7)
MCH RBC QN AUTO: 31 PG (ref 26.5–33)
MCHC RBC AUTO-ENTMCNC: 32.5 G/DL (ref 31.5–36.5)
MCV RBC AUTO: 95 FL (ref 78–100)
PLATELET # BLD AUTO: 199 10E3/UL (ref 150–450)
PROT UR-MCNC: 0.18 G/L
PROT/CREAT 24H UR: 0.13 G/G CR (ref 0–0.2)
RBC # BLD AUTO: 3.45 10E6/UL (ref 3.8–5.2)
TRICHOMONAS, WET PREP: ABNORMAL
WBC # BLD AUTO: 11.7 10E3/UL (ref 4–11)
WBC'S/HIGH POWER FIELD, WET PREP: ABNORMAL
YEAST, WET PREP: ABNORMAL

## 2021-09-24 PROCEDURE — 85027 COMPLETE CBC AUTOMATED: CPT | Performed by: ADVANCED PRACTICE MIDWIFE

## 2021-09-24 PROCEDURE — 87210 SMEAR WET MOUNT SALINE/INK: CPT | Performed by: ADVANCED PRACTICE MIDWIFE

## 2021-09-24 PROCEDURE — 84460 ALANINE AMINO (ALT) (SGPT): CPT | Performed by: ADVANCED PRACTICE MIDWIFE

## 2021-09-24 PROCEDURE — 84156 ASSAY OF PROTEIN URINE: CPT | Performed by: ADVANCED PRACTICE MIDWIFE

## 2021-09-24 PROCEDURE — G0463 HOSPITAL OUTPT CLINIC VISIT: HCPCS | Mod: 25

## 2021-09-24 PROCEDURE — 59025 FETAL NON-STRESS TEST: CPT | Mod: 59

## 2021-09-24 PROCEDURE — 84450 TRANSFERASE (AST) (SGOT): CPT | Performed by: ADVANCED PRACTICE MIDWIFE

## 2021-09-24 PROCEDURE — 76819 FETAL BIOPHYS PROFIL W/O NST: CPT

## 2021-09-24 RX ORDER — METRONIDAZOLE 500 MG/1
500 TABLET ORAL 2 TIMES DAILY
Qty: 14 TABLET | Refills: 0 | Status: SHIPPED | OUTPATIENT
Start: 2021-09-24 | End: 2021-10-01

## 2021-09-24 RX ORDER — LIDOCAINE 40 MG/G
CREAM TOPICAL
Status: DISCONTINUED | OUTPATIENT
Start: 2021-09-24 | End: 2021-09-24 | Stop reason: HOSPADM

## 2021-09-24 ASSESSMENT — MIFFLIN-ST. JEOR: SCORE: 1402.25

## 2021-09-24 NOTE — PROVIDER NOTIFICATION
09/24/21 1732   Provider Notification   Provider Name/Title THEO Lambert   Method of Notification Phone   Request Evaluate - Remote   Notification Reason Status Update     CNM updated on patient's BPP result of 8/8.  CNM would like AST, ALT, and a CBC with a diff as well as urine protein creatinine ratio since the patient admitted to having floaters in her vision.  Will update as necessary.

## 2021-09-24 NOTE — DISCHARGE INSTRUCTIONS
Discharge Instruction for Undelivered Patients      You were seen for: possible rupture of membranes  We Consulted: THOE Lambert  You had (Test or Medicine):Biophysical profile, pre-eclampsia lab work, fetal and uterine monitoring, sterile speculum exam, and wet prep     Diet:   Drink 8 to 12 glasses of liquids (milk, juice, water) every day.  You may eat meals and snacks.     Activity:  Count fetal kicks everyday (see handout)  Call your doctor or nurse midwife if your baby is moving less than usual.     Call your provider if you notice:  Swelling in your face or increased swelling in your hands or legs.  Headaches that are not relieved by Tylenol (acetaminophen).  Changes in your vision (blurring: seeing spots or stars.)  Nausea (sick to your stomach) and vomiting (throwing up).   Weight gain of 5 pounds or more per week.  Heartburn that doesn't go away.  Signs of bladder infection: pain when you urinate (use the toilet), need to go more often and more urgently.  The bag of hernandez (rupture of membranes) breaks, or you notice leaking in your underwear.  Bright red blood in your underwear.  Abdominal (lower belly) or stomach pain.  For first baby: Contractions (tightening) less than 5 minutes apart for one hour or more.  Second (plus) baby: Contractions (tightening) less than 10 minutes apart and getting stronger.  *If less than 34 weeks: Contractions (tightening) more than 6 times in one hour.  Increase or change in vaginal discharge (note the color and amount)  Other: call your doctor or midwife with any questions or concerns.     Follow-up:  As scheduled in the clinic.  Flagyl prescription 500 mg to be taken twice a day for one week.

## 2021-09-24 NOTE — PROVIDER NOTIFICATION
09/24/21 1640   Provider Notification   Provider Name/Title THEO Lambert   Method of Notification At Bedside   Request Evaluate in Person   Notification Reason Lab/Diagnostic Study     CNM at the bedside for sterile speculum exam.  Wet prep collected and BPP ordered to check amniotic fluid level. Will update as necessary.

## 2021-09-24 NOTE — TELEPHONE ENCOUNTER
Pt calling with LOF, was sitting on her couch and started having fluid leak, got up to the BR and went.  Still noted leaking after BR and trickled down her leg.    Ctx off and on, some spotting yesterday, normal FM.    Advised to go to L&D for eval.  L&D notified.  On call CNM paged to notify.    Tatyana Diaz RN

## 2021-09-24 NOTE — PLAN OF CARE
Data: Patient assessed in the [facility] for leaking vaginal fluid.  Cervical exam not examined.  Membranes intact.  Contractions/uterine assessment patient licha irregularly with irritability. FHT category 1.  Action:  Presumed adequate fetal oxygenation documented (see flow record). Discharge instructions reviewed.  Patient instructed to report change in fetal movement, vaginal leaking of fluid or bleeding, abdominal pain, or any concerns related to the pregnancy to her nurse/physician.    Response: Orders to discharge home per Petra Jackson.  Patient verbalized understanding of education and verbalized agreement with plan. Discharged to home at 1840.

## 2021-09-24 NOTE — PROVIDER NOTIFICATION
21 1630   Provider Notification   Provider Name/Title Petra THEO   Method of Notification Phone   Request Evaluate in Person     Patient updated on patient arrival.  She is a  at 35 3/7ths weeks.  Patient states that she thinks her water may have broken around 1530 this afternoon, clear fluid, no odor.  She denies any pain or bleeding and is still feeling baby move.  Per patient report she has had an uncomplicated pregnancy, but prenatal record has not been reviewed at this time.  Category 1 FHT, 2 contractions noted with the toco.  She does report that she is having some floaters in her vision almost daily, but denies any headache or epigastric pain.  Patellar reflexes 2+, with no clonus, bilaterally.  BP, WDL.  NATALYM will be to the unit.

## 2021-09-24 NOTE — PROVIDER NOTIFICATION
09/24/21 1820   Provider Notification   Provider Name/Title THEO Lambert   Method of Notification Phone   Request Evaluate - Remote   Notification Reason Lab/Diagnostic Study     CNM updated on lab results.  She does have clue cells from her wet prep, so she is going to send a prescription for Flagyl to be taken BID for 1 week.  Orders to discharge to home, undelivered with education on when to return.

## 2021-09-24 NOTE — PROGRESS NOTES
"MATERNAL ASSESSMENT CENTER CNM TRIAGE NOTE    Nicollette M Armijos is a 21 year old  with and IUP at 35w3d who presents with complaint of \"gush of fluid\" this afternoon when she was sitting oh the couch. She got up to go to the bathroom, and when she stood up she noticed fluid \"dripping down the leg\". Denies any further episodes of loss of fluid.   She also reports occasional vision floaters, which she has mentioned at previous OB visits as well and have remained stable. Last PIH labs collected  and wnl. Bps have been stable this pregnancy.     Patient states baby is active.  Denies ROM   Denies vaginal bleeding  Present OB History at [facility] with the CNMs.    Problems this pregnancy:   Patient Active Problem List    Back pain affecting pregnancy in third trimester         Priority: Medium [2]         Date Noted: 2021            Seen in MAC . Lower abdominal and mid to lower            back pain. Fetal             ultrasound WNL.       Encounter for triage in pregnant patient         Priority: Medium [2]         Date Noted: 2021      Uterine size-date discrepancy in third trimester         Priority: Medium [2]         Date Noted: 2021            Size < dates            Growth US ordered at 32wk visit            EFW 41%, 4lb 8oz      Indication for care in labor or delivery         Priority: Medium [2]         Date Noted: 2021      Nausea/vomiting in pregnancy         Priority: Medium [2]         Date Noted: 2021            Reglan not effective            Rx given for Zofran at 13wks      Encounter for supervision of other normal pregnancy, third trimester         Priority: Medium [2]         Date Noted: 2021                        Clinic/Hospital: [facility]            Partner Name: Faustino            Ultrasound predicts sex: male            Childrens names/ages: 7 yo stepson-- Bently, lives            with Jennifer             1/2 time            Previous " labor experiences: n/a            Waterbirth (interest, declined, ineligible): n/a            Level of education/occupation: 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, will             graduate before baby is born            Pertinent History:             PP contraception: NFP            Hx PPD/Depression/Anxiety: depression/anxiety,            d/c'd Zoloft prior to             pregnancy, open to counseling             Birth Ed: Discussed free videos available through            [facility]            Plans for labor pain management: planning epidural            Plans for post partum recovery/time off: 6wks off,            Faustino will have 2wks off, baby will be watched by            Lorrie's friend who is a SAHM            Feeding preference: formula            Breast pump: na            Peds provider:  Unsure as of 32wks            Car seat:             Circumcision: planning            Discussed 2 week visit:             Tdap:             Flu:             Covid vaccine: completed      Anxiety during pregnancy         Priority: Medium [2]         Date Noted: 04/19/2021            Discontinued Zoloft approx 6-7 before pregnancy            due to her symptoms being             stable            BH referral placed at IOB [x]            Open to meds later in pregnancy if needed      BRIANNA (generalized anxiety disorder)         Priority: Medium [2]         Date Noted: 11/04/2017      Asthma, exercise induced         Priority: Medium [2]         Date Noted: 03/15/2014      Abdominal pain, generalized         Priority: Medium [2]         Date Noted: 09/24/2013            Formatting of this note might be different from            the original.Had Colonoscopy and EGD at [facility] on                         11/14/13.  EGD showed small degree of irritation            in the lower esophagus.  Colonoscopy was normal.             May have constipation issues.  Recommendation was             "             to use omeprazole 20 mg daily for a couple of            months, and Miralax 17 gm daily until constipation            resolves.  See EMR for details.      Major depressive disorder, single episode, moderate (H)         Priority: Medium [2]         Date Noted: 08/13/2012            Formatting of this note might be different from            the original.Jannet Sheth at [facility].    Phone number            [phone].        ROS:  Patient is alert and oriented  Review Of Systems  Skin: negative  Eyes: floaters  Ears/Nose/Throat: negative  Respiratory: No shortness of breath, dyspnea on exertion, cough, or hemoptysis and Shortness of breath-  Cardiovascular: negative  Gastrointestinal: negative  Genitourinary: leaking fluid  Musculoskeletal: negative  Neurologic: negative  Psychiatric: negative  Hematologic/Lymphatic/Immunologic: negative  Endocrine: negative    PHYSICAL EXAM:  /70 (BP Location: Right arm)   Temp 98  F (36.7  C) (Oral)   Resp 17   Ht 1.676 m (5' 6\")   Wt 63 kg (139 lb)   LMP 01/25/2021 (Approximate)   BMI 22.44 kg/m      FHT's 135 with moderate variability  Accelerations: present   Decelerations:  absent        Contractions: Pt licha irregularly.      Abdomen: gravid, , nontender   Bloody show: no  Cervix: appears long, thick, FT dilated. No pooling or leaking of fluid with valsalva. Scant amount of thin white physiologic discharge in vaginal vault, no odor.   Membranes are intact     Results for orders placed or performed during the hospital encounter of 09/24/21   US Fetal Biophys Prof w/o Non Stress Test     Status: None    Narrative    EXAM: US OB FETAL BIOPHY PROFILE W/O NON STRESS SINGLE  LOCATION: [facility]  DATE/TIME: 9/24/2021 4:55 PM    INDICATION: Leaking fluid, pregnant, suspect SROM  COMPARISON: Studies dating back to 04/12/2021    FINDINGS:  Single living fetus, cephalic " presentation.    HEART RATE: 152 bpm.  SDP 6.9 cm. PAGE 9.2 cm  PLACENTA: Anterior. No previa.  CERVIX: Approximately 3.3 cm, obscured from fetal head position.     CORD DOPPLER: S/D ratio: N/A. RI: N/A.    2/2 fetal breathing  2/2 fetal movements  2/2 fetal tone  2/2 amniotic fluid    Total biophysical profile       Impression    IMPRESSION:  1.  Single living intrauterine gestation in cephalic presentation.  2.  Normal  biophysical profile.  3.  Normal amniotic fluid volume.   AST     Status: Normal   Result Value Ref Range    AST 15 0 - 45 U/L   ALT     Status: Normal   Result Value Ref Range    ALT 15 0 - 50 U/L   CBC with platelets     Status: Abnormal   Result Value Ref Range    WBC Count 11.7 (H) 4.0 - 11.0 10e3/uL    RBC Count 3.45 (L) 3.80 - 5.20 10e6/uL    Hemoglobin 10.7 (L) 11.7 - 15.7 g/dL    Hematocrit 32.9 (L) 35.0 - 47.0 %    MCV 95 78 - 100 fL    MCH 31.0 26.5 - 33.0 pg    MCHC 32.5 31.5 - 36.5 g/dL    RDW 12.6 10.0 - 15.0 %    Platelet Count 199 150 - 450 10e3/uL   Protein  random urine with Creat Ratio     Status: None   Result Value Ref Range    Total Protein Random Urine g/L 0.18 g/L    Total Protein Urine g/gr Creatinine 0.13 0.00 - 0.20 g/g Cr    Creatinine Urine mg/dL 135 mg/dL   Wet prep     Status: Abnormal    Specimen: Vagina; Swab   Result Value Ref Range    Trichomonas Absent Absent    Yeast Absent Absent    Clue Cells Present (A) Absent    WBCs/high power field 1+ (A) None       ASSESSMENT :   21 year old  with gutierrez IUP 35w3d not in labor  NST  reactive  GBS unknown and membranes intact  PIH labs wnl  BPP 8, PAGE 9.2cm  Bacterial vaginosis - wet prep shows clue cells    PLAN:  Start Flagyl 500mg BID x 7days  Discharge home  Continue routine prenatal care    Return with any further sensation of leaking fluid    Teaching done r/t to s/s of labor, SROM, decreased fetal movement, comfort measures in third trimester.  Instructed to please refer to the discharge handouts,  the RN triage line or on-call CNM for any questions or concerns.  Pt verbalizes understanding and agreement with current plan of care.    Petra Jackson CNM

## 2021-09-28 ENCOUNTER — PRENATAL OFFICE VISIT (OUTPATIENT)
Dept: OBGYN | Facility: CLINIC | Age: 21
End: 2021-09-28
Payer: COMMERCIAL

## 2021-09-28 VITALS — SYSTOLIC BLOOD PRESSURE: 102 MMHG | DIASTOLIC BLOOD PRESSURE: 70 MMHG | WEIGHT: 143.3 LBS | BODY MASS INDEX: 23.13 KG/M2

## 2021-09-28 DIAGNOSIS — Z34.83 ENCOUNTER FOR SUPERVISION OF OTHER NORMAL PREGNANCY, THIRD TRIMESTER: Primary | ICD-10-CM

## 2021-09-28 DIAGNOSIS — O26.10 LOW WEIGHT GAIN DURING PREGNANCY, ANTEPARTUM: ICD-10-CM

## 2021-09-28 PROCEDURE — 87653 STREP B DNA AMP PROBE: CPT | Performed by: ADVANCED PRACTICE MIDWIFE

## 2021-09-28 PROCEDURE — 99207 PR PRENATAL VISIT: CPT | Performed by: ADVANCED PRACTICE MIDWIFE

## 2021-09-28 NOTE — PROGRESS NOTES
S: Feels well, ready for baby! Endorses occasional cramping/BH, no vaginal bleeding or LOF. No further visual changes, epigastric pain, or headache. Baby active. 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    O: Vitals: /70 (BP Location: Right arm, Cuff Size: Adult Regular)   Wt 65 kg (143 lb 4.8 oz)   LMP 01/25/2021 (Approximate)   Breastfeeding No   BMI 23.13 kg/m    BMI= Body mass index is 23.13 kg/m .  Exam:  Constitutional: Healthy, alert and no distress  Respiratory: Respirations even and unlabored  Gastrointestinal: Abdomen soft, non-tender. Fundus measures appropriate for gestational age. Fetal heart tones heard without difficulty and within normal limits. Cephalic per Leopold's maneuver.  : Normal external genitalia without lesions. VE: 1/80%/-1/mid/average, vertex palpated.  Psychiatric: Mentation appears normal and affect normal/bright    A/P:  (Z34.83) Encounter for supervision of other normal pregnancy, third trimester  (primary encounter diagnosis)  Plan: Group B strep PCR  - GBS collected; will notify patient of results. Reports GI upset with PCNs/Abx, but has never entirely avoided this class or told she should.  - Pain medication options reviewed  - Discussed elective IOL prior to 41 weeks if cervix favorable. She is agreeable to cervical exams at subsequent visits leading up to due date.    (O26.10) Low weight gain during pregnancy, antepartum  - TWG 11 lbs, below expected range of 25-35 lbs, but with consistent interval trends  - Growth ultrasound at 33 wks for S/D discrepancy normal, EFW 41%; S=D today    Encouraged patient to call with any questions or concerns.  Return to clinic in 1 weeks      Susana Pretty, MARY, APRN, CNM

## 2021-09-28 NOTE — NURSING NOTE
"Chief Complaint   Patient presents with     Prenatal Care       Initial /70 (BP Location: Right arm, Cuff Size: Adult Regular)   Wt 65 kg (143 lb 4.8 oz)   LMP 2021 (Approximate)   Breastfeeding No   BMI 23.13 kg/m   Estimated body mass index is 23.13 kg/m  as calculated from the following:    Height as of 21: 1.676 m (5' 6\").    Weight as of this encounter: 65 kg (143 lb 4.8 oz).  BP completed using cuff size: regular    Questioned patient about current smoking habits.  Pt. has never smoked.          "

## 2021-09-28 NOTE — PATIENT INSTRUCTIONS
GROUP B STREP    Group B Strep (GBS) is a common bacteria that is sometimes found in the vagina, urinary tract or rectum.  It is not harmful typically to adults but can cause serious illness in newborns.  It occasionally is passed from mother to baby during birth.   It is important to test in pregnancy.  When a woman is found to be positive for GBS, either at the first prenatal visit or by taking a culture at 36 weeks, treatment will be offered to reduce the chance of spreading the bacteria to the baby.       Treatment consists of either oral antibiotics early in pregnancy or antibiotics given by IV during labor if testing is positive at 36 weeks.      Even without treatment the baby rarely (1-2% of the time) gets infected.  With treatment the baby almost never gets infected.       There really isn't anything you can do to keep from getting or being positive for GBS.  It isn't sexually transmitted and there are no symptoms if you are positive.       Your midwife will discuss your results with you and make recommendations for treatment.    Weeks 37 thru 40 - Gestational Age (Fetal Age - Weeks 35 thru 38):  At 38 weeks the fetus is considered full term and is ready to make its appearance at any time. As your baby becomes bigger, you may notice a change in fetal movement. If you notice a decrease in fetal movement, make sure to talk with your doctor. The fingernails have grown long and will need to be cut soon after birth. Small breast buds are present on both sexes. The mother is supplying the fetus with antibodies that will help protect against disease. All organs are developed, with the lungs maturing all the way until the day of delivery. The fetus is about 19 - 21 inches in length and weighs anywhere from 6   lbs to 10 lbs.

## 2021-09-29 LAB
GP B STREP DNA SPEC QL NAA+PROBE: NEGATIVE
PATIENT PENICILLIN, AMOXICILLIN, CEPHALOSPORINS ALLERGY: NO

## 2021-10-01 ENCOUNTER — TELEPHONE (OUTPATIENT)
Dept: OBGYN | Facility: CLINIC | Age: 21
End: 2021-10-01

## 2021-10-04 ENCOUNTER — PRENATAL OFFICE VISIT (OUTPATIENT)
Dept: OBGYN | Facility: CLINIC | Age: 21
End: 2021-10-04
Payer: COMMERCIAL

## 2021-10-04 ENCOUNTER — HOSPITAL ENCOUNTER (OUTPATIENT)
Facility: CLINIC | Age: 21
Discharge: HOME | End: 2021-10-04
Attending: ADVANCED PRACTICE MIDWIFE | Admitting: ADVANCED PRACTICE MIDWIFE
Payer: COMMERCIAL

## 2021-10-04 ENCOUNTER — NURSE TRIAGE (OUTPATIENT)
Dept: NURSING | Facility: CLINIC | Age: 21
End: 2021-10-04

## 2021-10-04 VITALS — BODY MASS INDEX: 23.34 KG/M2 | SYSTOLIC BLOOD PRESSURE: 102 MMHG | WEIGHT: 144.6 LBS | DIASTOLIC BLOOD PRESSURE: 72 MMHG

## 2021-10-04 VITALS — TEMPERATURE: 98.2 F | SYSTOLIC BLOOD PRESSURE: 128 MMHG | DIASTOLIC BLOOD PRESSURE: 69 MMHG | RESPIRATION RATE: 18 BRPM

## 2021-10-04 DIAGNOSIS — O26.10 LOW WEIGHT GAIN DURING PREGNANCY, ANTEPARTUM: ICD-10-CM

## 2021-10-04 DIAGNOSIS — Z34.83 ENCOUNTER FOR SUPERVISION OF OTHER NORMAL PREGNANCY, THIRD TRIMESTER: Primary | ICD-10-CM

## 2021-10-04 LAB
ALBUMIN UR-MCNC: 20 MG/DL
APPEARANCE UR: ABNORMAL
BACTERIA #/AREA URNS HPF: ABNORMAL /HPF
BILIRUB UR QL STRIP: NEGATIVE
COLOR UR AUTO: YELLOW
GLUCOSE UR STRIP-MCNC: NEGATIVE MG/DL
HGB UR QL STRIP: NEGATIVE
KETONES UR STRIP-MCNC: NEGATIVE MG/DL
LEUKOCYTE ESTERASE UR QL STRIP: NEGATIVE
MUCOUS THREADS #/AREA URNS LPF: PRESENT /LPF
NITRATE UR QL: NEGATIVE
PH UR STRIP: 6.5 [PH] (ref 5–7)
RBC URINE: 1 /HPF
SP GR UR STRIP: 1.02 (ref 1–1.03)
SQUAMOUS EPITHELIAL: 3 /HPF
UROBILINOGEN UR STRIP-MCNC: 6 MG/DL
WBC URINE: 3 /HPF

## 2021-10-04 PROCEDURE — 87086 URINE CULTURE/COLONY COUNT: CPT | Performed by: ADVANCED PRACTICE MIDWIFE

## 2021-10-04 PROCEDURE — 96372 THER/PROPH/DIAG INJ SC/IM: CPT | Performed by: ADVANCED PRACTICE MIDWIFE

## 2021-10-04 PROCEDURE — 250N000013 HC RX MED GY IP 250 OP 250 PS 637: Performed by: ADVANCED PRACTICE MIDWIFE

## 2021-10-04 PROCEDURE — 99213 OFFICE O/P EST LOW 20 MIN: CPT | Performed by: ADVANCED PRACTICE MIDWIFE

## 2021-10-04 PROCEDURE — 81001 URINALYSIS AUTO W/SCOPE: CPT | Performed by: ADVANCED PRACTICE MIDWIFE

## 2021-10-04 PROCEDURE — 250N000011 HC RX IP 250 OP 636: Performed by: ADVANCED PRACTICE MIDWIFE

## 2021-10-04 PROCEDURE — 99207 PR PRENATAL VISIT: CPT | Performed by: ADVANCED PRACTICE MIDWIFE

## 2021-10-04 PROCEDURE — G0463 HOSPITAL OUTPT CLINIC VISIT: HCPCS | Mod: 25

## 2021-10-04 RX ORDER — ACETAMINOPHEN 325 MG/1
975 TABLET ORAL ONCE
Status: COMPLETED | OUTPATIENT
Start: 2021-10-04 | End: 2021-10-04

## 2021-10-04 RX ORDER — LIDOCAINE 40 MG/G
CREAM TOPICAL
Status: DISCONTINUED | OUTPATIENT
Start: 2021-10-04 | End: 2021-10-04 | Stop reason: HOSPADM

## 2021-10-04 RX ORDER — DIPHENHYDRAMINE HCL 25 MG
50 CAPSULE ORAL ONCE
Status: COMPLETED | OUTPATIENT
Start: 2021-10-04 | End: 2021-10-04

## 2021-10-04 RX ORDER — MORPHINE SULFATE 10 MG/ML
10 INJECTION, SOLUTION INTRAMUSCULAR; INTRAVENOUS ONCE
Status: COMPLETED | OUTPATIENT
Start: 2021-10-04 | End: 2021-10-04

## 2021-10-04 RX ADMIN — MORPHINE SULFATE 10 MG: 10 INJECTION INTRAVENOUS at 21:11

## 2021-10-04 RX ADMIN — ACETAMINOPHEN 975 MG: 325 TABLET, FILM COATED ORAL at 20:39

## 2021-10-04 RX ADMIN — DIPHENHYDRAMINE HYDROCHLORIDE 50 MG: 25 CAPSULE ORAL at 21:11

## 2021-10-04 NOTE — TELEPHONE ENCOUNTER
"OB Triage Call      Is patient's OB/Midwife with the formerly [facility] or [facility]? [facility]- Proceed with triage     Reason for call: \"I am having contractions. They are every 1-7 minutes, lasting 1 minute each. I was seen today in clinic, see [facility] and I was licha there. She said to monitor and if they were consistent to call back . They have been very consistent and I also have some lower abdominal and rectal pressure. No bleeding and baby is active. This has been since today at 2 pm\"  Assessment: Denies other sx  Triaged and advised L&D per protocol   Paged on eric midwife Kathy Angulo see below.        Plan: L&D    Patient plans to deliver at [facility]     Patient's primary OB Provider is: Sukhwinder     Per protocol recommendations Patient to be evaluated in L&D. Patient's primary OB is [facility]. Paged on-call midwife for patient's primary OB clinic (refer to where patient is seen as midwives may go to multiple locations) Kathy Angulo midwife paged to FNA to call FNA back at 6:30pm.  Call returned at 6:32 pm and advised on triage assessment. Does midwife recommend L&D evaluation? Yes-  Labor and delivery at [facility] ([phone]) notified of patient's pending arrival. Patient notified to go to L&D by Midwife     Is patient's delivering hospital on divert? No      36w6d    Estimated Date of Delivery: Oct 26, 2021        OB History    Para Term  AB Living   3 0 0 0 2 0   SAB TAB Ectopic Multiple Live Births   1 0 0 0 0      # Outcome Date GA Lbr Isidro/2nd Weight Sex Delivery Anes PTL Lv   3 Current            2 SAB  4w0d          1 AB  6w0d    AB, COMPLETE          No results found for: GBS       Cris Boggs RN 10/04/21 6:18 PM  [facility] Nurse Advisor    Reason for Disposition    [1] First baby (primipara) AND [2] contractions < 6 minutes apart  AND [3] present 2 hours    Additional Information    Negative: Passed out (i.e., lost consciousness, collapsed and was not " "responding)    Negative: Shock suspected (e.g., cold/pale/clammy skin, too weak to stand, low BP, rapid pulse)    Negative: Difficult to awaken or acting confused (e.g., disoriented, slurred speech)    Negative: [1] SEVERE abdominal pain (e.g., excruciating) AND [2] constant AND [3] present > 1 hour    Negative: Severe bleeding (e.g., continuous red blood from vagina, or large blood clots)    Negative: Umbilical cord hanging out of the vagina (shiny, white, curled appearance, \"like telephone cord\")    Negative: Uncontrollable urge to push (i.e., feels like baby is coming out now)    Negative: Can see baby    Negative: Sounds like a life-threatening emergency to the triager    Protocols used: PREGNANCY - LABOR-A-AH      "

## 2021-10-04 NOTE — PROGRESS NOTES
S: Uncomfortable with contractions today. Endorses onset between 7-8 am which have progressively become closer together and noticeably more intense since 2:15 this afternoon. Also having icreased lower back pain with contractions and aching down her thighs. Baby remains active. Denies LOF or vaginal bleeding. Slightly nauseated this morning but has been eating appropriately and staying hydrated.     O: Vitals: /72 (BP Location: Right arm, Cuff Size: Adult Regular)   Wt 65.6 kg (144 lb 9.6 oz)   LMP 01/25/2021 (Approximate)   Breastfeeding No   BMI 23.34 kg/m    BMI= Body mass index is 23.34 kg/m .  Exam:  Constitutional: Healthy, alert and no distress  Respiratory: Respirations even and unlabored  Gastrointestinal: Abdomen soft, non-tender. Fundus measures appropriate for gestational age. Fetal heart tones heard without difficulty and within normal limits. Cephalic per Leopold's maneuver.   : Normal external genitalia without lesions. VE: 1/90%/-2/mid/medium  Psychiatric: mentation appears normal and affect normal/bright    A/P:  (Z34.83) Encounter for supervision of other normal pregnancy, third trimester  (primary encounter diagnosis)  - Labor signs/symptoms and warning signs discussed, aware of numbers to call, reviewed 5-1-1 rule  - Reviewed counting fetal movement  - Encouraged walks, warm baths, upright positioning, use of exercise ball, side-lying release to promote fetal rotation    (O26.10) Low weight gain during pregnancy, antepartum  - TWG 12.5 lbs, appropriate interval gain since last visit  - Growth 41% at 33wks, S=D today    Call if contractions continue to intensify or LOF for labor evaluation. Otherwise, return to clinic in 1 week.      Susana Pretty, MARY, APRN, CNM

## 2021-10-04 NOTE — PATIENT INSTRUCTIONS
"Weeks 37 thru 40 - Gestational Age (Fetal Age - Weeks 35 thru 38):  At 38 weeks the fetus is considered full term and is ready to make its appearance at any time. As your baby becomes bigger, you may notice a change in fetal movement. If you notice a decrease in fetal movement, make sure to talk with your doctor. The fingernails have grown long and will need to be cut soon after birth. Small breast buds are present on both sexes. The mother is supplying the fetus with antibodies that will help protect against disease. All organs are developed, with the lungs maturing all the way until the day of delivery. The fetus is about 19 - 21 inches in length and weighs anywhere from 6   lbs to 10 lbs.    Labor Instructions for Midwife Patients    When to call:  Both during and after office hours call [phone]. There is a Nurse Midwife available to take your calls and answer your questions 24 hours a day.     When to call:  Call anytime you have important concerns about you or your baby.     Call if:    You are having contractions at regular intervals about 5-6 minutes apart lasting 30-60 seconds and becoming increasingly more intense     You have an uncontrollable gush of fluid from your vagina or feel a pop and gush like your water has broken    You have HEAVY bleeding, like heavy period, blood running down your legs, or  soaking a pad.     Some bleeding after a pelvic exam, after intercourse, or in labor when your cervix is dilating is normal and is referred to as \"bloody show\"    You have severe, continuous back or abdominal pain    You feel it is time to go to the hospital    If this is your first labor, call when contractions are very intense and have been about every 3-4 minutes for about an hour    If it is your second labor or more, call when contractions are strong and about every 3-5 minutes or sooner depending on your level of discomfort.     Keep in mind we are always here for you! If you have questions, " concerns please don't hesitate to call us.     What to eat/drink in labor: Drink plenty of fluid (water most importantly, juice, soda or tea without caffeine). Eat rice, pasta, soup, cereal, bread/toast, and fruit. Avoid dairy and greasy food as they are difficult to digest and you may experience some nausea during labor.    Comfort measures:    Baths and showers (ok even with ruptured membranes, it may temporarily slow contractions if you are still in the early stage of labor)    Warm/hot packs for back pain or discomfort    Back, belly, or thigh massages    Standing, rocking, walking, leaning over bed or tables, side-lying and sleeping    Miscellaneous:     Contractions are timed from the beginning of one to the beginning of the next    Try hard to sleep during the early stage of labor when you are not that uncomfortable. Timing of contractions at this point is not important    Even if you cannot sleep, resting in bed or on the couch can help you maintain your energy for labor    When you arrive at the hospital the nurse will check your baby's heartbeat, check your cervix, and will call us. The midwife on call will come in and be with you when you are in active labor    After hours you need to enter the hospital through the emergency room      [email or web address] for helpful positioning techniques

## 2021-10-05 NOTE — DISCHARGE INSTRUCTIONS
Discharge Instruction for Undelivered Patients      You were seen for: Labor Assessment  We Consulted: Kathy Angulo CNM  You had (Test or Medicine): Fetal and Uterine Monitoring, cervical exam (1cm/90%/-2), Tylenol 975mg at 2040, and Morphine IM and Benadryl 50mg at 2115    Diet:   Drink 8 to 12 glasses of liquids (milk, juice, water) every day.  You may eat meals and snacks.     Activity:  Call your doctor or nurse midwife if your baby is moving less than usual.     Call your provider if you notice:  Swelling in your face or increased swelling in your hands or legs.  Headaches that are not relieved by Tylenol (acetaminophen).  Changes in your vision (blurring: seeing spots or stars.)  Nausea (sick to your stomach) and vomiting (throwing up).   Weight gain of 5 pounds or more per week.  Heartburn that doesn't go away.  Signs of bladder infection: pain when you urinate (use the toilet), need to go more often and more urgently.  The bag of hernandez (rupture of membranes) breaks, or you notice leaking in your underwear.  Bright red blood in your underwear.  Abdominal (lower belly) or stomach pain.  For first baby: Contractions (tightening) less than 5 minutes apart for one hour or more.  Increase or change in vaginal discharge (note the color and amount)      Follow-up:  As scheduled in the clinic   Text Kathy Angulo CNM at: [phone] if questions or concerns tonight. The midwife on-call will change at 8AM 10/05.

## 2021-10-05 NOTE — PLAN OF CARE
Data: Patient presented to [facility]: 10/4/2021  7:31 PM.  Reason for maternal/fetal assessment is uterine contractions she feels in her lower abd and back with constant pressure in perineum (has had pressure for some time now). Patient reports contractions started this morning as cramping and early this afternoon contractions have been more consistent. Currently licha every 1-7 minutes that pt states she's able to talk through if she needs to.  Patient is a .  Prenatal record reviewed. Pregnancy has been uncomplicated.  Gestational Age 36w6d. VSS. Fetal movement active. Patient denies leaking of vaginal fluid/rupture of membranes, vaginal bleeding, nausea, vomiting, headache, visual disturbances, epigastric or URQ pain, significant edema. Support person is present.   Action: Verbal consent for EFM. Triage assessment completed. Bill of rights reviewed.  Response: Patient verbalized agreement with plan. Will contact CNM Kathy Angulo with update and for further orders.

## 2021-10-05 NOTE — PROVIDER NOTIFICATION
10/04/21 1940   Provider Notification   Provider Name/Title THEO Chavez   Method of Notification At Bedside   Request Evaluate - Remote   CNM at bedside assessing pt and determining plan of care.     Orders UA, orally hydrate, and going to complete fetal and uterine monitoring.

## 2021-10-05 NOTE — PLAN OF CARE
Data: Patient assessed in the [facility] for uterine contractions.  Cervical exam 1/90/-2.  Membranes intact.  Contractions/uterine assessment per toco rare with irritability.  Action:  Presumed adequate fetal oxygenation documented (see flow record). Discharge instructions reviewed.  Patient instructed to report change in fetal movement, vaginal leaking of fluid or bleeding, abdominal pain, or any concerns related to the pregnancy to her nurse/physician.    Response: Orders to discharge home per Kathy Angulo.  Patient verbalized understanding of education and verbalized agreement with plan. Discharged to home at 2125.

## 2021-10-05 NOTE — PROVIDER NOTIFICATION
10/04/21 2021   Provider Notification   Provider Name/Title Kathy NATALYMONICA   Method of Notification At Bedside   Request Evaluate in Person   CNM at bedside completing SVE - unchanged from clinic (1/90/-2). Pt continues to report painful contractions that she grimaces through. Per toco rare contraction with irritability. FHT Category I with moderate variability and accelerations, no decels.     Plan is to await UA results that are still in process and give tylenol.

## 2021-10-05 NOTE — PROVIDER NOTIFICATION
10/04/21 2100   Provider Notification   Provider Name/Title KathyTHEO   Method of Notification At Bedside   Request Evaluate in Person   Notification Reason Lab/Diagnostic Study   CNM reviews UA results with pt. Pt reports she's been having cramping that hasn't allowed her to sleep well for days now. Pt doesn't think she'd be able to sleep well with current contractions.    Encourages continued po hydration, orders IM morphine and vistaril, may discontinue monitoring at this time, and discharge home.

## 2021-10-05 NOTE — PROGRESS NOTES
[facility]ER[facility]ESSMENT CENTER CNM TRIAGE NOTE    Nicollette M Armijos is a 21 year old  with and IUP at 36w6d who presents with complaint of irregular contractions and back pain since earlier today. Upon arriving at hospital reports that contractions are ~ 1-7 minutes apart. Intermittent sharp low back pain.    Patient states baby is active.  Denies ROM   Denies vaginal bleeding  Present OB History at [facility] with the CNMs.     Problems this pregnancy:   1. Low weight gain in pregnancy    ROS:  Patient is alert and oriented      PHYSICAL EXAM:  LMP 2021 (Approximate)     FHT's 134 with moderate variability  Accelerations: present   Decelerations:  absent        Contractions: Pt is licha in an irregular pattern, seems more like irritabiliy    Abdomen: gravid, nontender  Bloody show: no  Cervix: 1/ 80% / Mid/ average/ -2  Membranes are intact       ASSESSMENT :   21 year old  with gutierrez IUP 36w6d not in labor  NST  reactive  GBS negative and membranes intact       Results for orders placed or performed during the hospital encounter of 10/04/21   UA with Microscopic     Status: Abnormal   Result Value Ref Range    Color Urine Yellow Colorless, Straw, Light Yellow, Yellow    Appearance Urine Slightly Cloudy (A) Clear    Glucose Urine Negative Negative mg/dL    Bilirubin Urine Negative Negative    Ketones Urine Negative Negative mg/dL    Specific Gravity Urine 1.024 1.003 - 1.035    Blood Urine Negative Negative    pH Urine 6.5 5.0 - 7.0    Protein Albumin Urine 20  (A) Negative mg/dL    Urobilinogen Urine 6.0 (A) Normal, 2.0 mg/dL    Nitrite Urine Negative Negative    Leukocyte Esterase Urine Negative Negative    Bacteria Urine Few (A) None Seen /HPF    Mucus Urine Present (A) None Seen /LPF    RBC Urine 1 <=2 /HPF    WBC Urine 3 <=5 /HPF    Squamous Epithelials Urine 3 (H) <=1 /HPF       PLAN:  Therapeutic rest, Morphine 10mg IM/Diphenhydramine 50mg PO now  Patient has partner, Faustino  to drive her home.  Discharge home  Continue routine prenatal care      Teaching done r/t to s/s of labor, SROM, decreased fetal movement, comfort measures in third trimester.  Instructed to please refer to the discharge handouts, the RN triage line or on-call CNM for any questions or concerns.  Pt verbalizes understanding and agreement with current plan of care.    AGUILAR OHARA CNM

## 2021-10-06 ENCOUNTER — PRENATAL OFFICE VISIT (OUTPATIENT)
Dept: OBGYN | Facility: CLINIC | Age: 21
End: 2021-10-06
Payer: COMMERCIAL

## 2021-10-06 VITALS — DIASTOLIC BLOOD PRESSURE: 76 MMHG | WEIGHT: 144.5 LBS | SYSTOLIC BLOOD PRESSURE: 106 MMHG | BODY MASS INDEX: 23.32 KG/M2

## 2021-10-06 DIAGNOSIS — Z34.83 ENCOUNTER FOR SUPERVISION OF OTHER NORMAL PREGNANCY, THIRD TRIMESTER: Primary | ICD-10-CM

## 2021-10-06 LAB — BACTERIA UR CULT: NO GROWTH

## 2021-10-06 PROCEDURE — 99207 PR PRENATAL VISIT: CPT | Performed by: ADVANCED PRACTICE MIDWIFE

## 2021-10-06 NOTE — PROGRESS NOTES
Feeling uncomfortable.  Baby is active. Denies any leaking of fluid, vaginal bleeding, regular uterine contractions, or headaches or other concerns.  Discussed strategies for sleeping.  Benadryl helps get to sleep.  Declines vistaril. Sounds like it hurts everywhere.    Sometimes her chest hurts, especially if she is laying down.  Not short of breath.  We discussed that this can be from anxiety, exhaustion, less space for lungs.  If it continues and/or she has shortness of breath she should go to the emergency department.  Appears well, uncomfortable, wondering when the baby will come.    She is still working as a dental assistant.  Thinking about stopping.  We discussed.    Reviewed to call for contractions, loss of fluid, vaginal bleeding, decreased fetal movement or any other questions or concerns.    RTC in 1 weeks.  Beth Michael, MARY, APRN, CNM

## 2021-10-06 NOTE — NURSING NOTE
"Chief Complaint   Patient presents with     Prenatal Care     Reports that the stomach pain feels like period cramps. Denies stomach hardening with this. Worse at night, hard to sleep. Also reports chest pain       Initial /76 (BP Location: Right arm, Cuff Size: Adult Regular)   Wt 65.5 kg (144 lb 8 oz)   LMP 2021 (Approximate)   BMI 23.32 kg/m   Estimated body mass index is 23.32 kg/m  as calculated from the following:    Height as of 21: 1.676 m (5' 6\").    Weight as of this encounter: 65.5 kg (144 lb 8 oz).  BP completed using cuff size: regular    Questioned patient about current smoking habits.  Pt. has never smoked.          "

## 2021-10-07 ENCOUNTER — TELEPHONE (OUTPATIENT)
Dept: OBGYN | Facility: CLINIC | Age: 21
End: 2021-10-07

## 2021-10-07 ENCOUNTER — NURSE TRIAGE (OUTPATIENT)
Dept: NURSING | Facility: CLINIC | Age: 21
End: 2021-10-07

## 2021-10-07 ENCOUNTER — HOSPITAL ENCOUNTER (OUTPATIENT)
Facility: CLINIC | Age: 21
Discharge: HOME | End: 2021-10-07
Attending: ADVANCED PRACTICE MIDWIFE | Admitting: ADVANCED PRACTICE MIDWIFE
Payer: COMMERCIAL

## 2021-10-07 VITALS — RESPIRATION RATE: 18 BRPM | SYSTOLIC BLOOD PRESSURE: 108 MMHG | TEMPERATURE: 98.8 F | DIASTOLIC BLOOD PRESSURE: 70 MMHG

## 2021-10-07 PROCEDURE — G0463 HOSPITAL OUTPT CLINIC VISIT: HCPCS

## 2021-10-07 RX ORDER — METOCLOPRAMIDE 10 MG/1
10 TABLET ORAL EVERY 6 HOURS PRN
Status: DISCONTINUED | OUTPATIENT
Start: 2021-10-07 | End: 2021-10-07 | Stop reason: HOSPADM

## 2021-10-07 RX ORDER — ONDANSETRON 2 MG/ML
4 INJECTION INTRAMUSCULAR; INTRAVENOUS EVERY 6 HOURS PRN
Status: DISCONTINUED | OUTPATIENT
Start: 2021-10-07 | End: 2021-10-07 | Stop reason: HOSPADM

## 2021-10-07 RX ORDER — PROCHLORPERAZINE 25 MG
25 SUPPOSITORY, RECTAL RECTAL EVERY 12 HOURS PRN
Status: DISCONTINUED | OUTPATIENT
Start: 2021-10-07 | End: 2021-10-07 | Stop reason: HOSPADM

## 2021-10-07 RX ORDER — PROCHLORPERAZINE MALEATE 10 MG
10 TABLET ORAL EVERY 6 HOURS PRN
Status: DISCONTINUED | OUTPATIENT
Start: 2021-10-07 | End: 2021-10-07 | Stop reason: HOSPADM

## 2021-10-07 RX ORDER — METOCLOPRAMIDE HYDROCHLORIDE 5 MG/ML
10 INJECTION INTRAMUSCULAR; INTRAVENOUS EVERY 6 HOURS PRN
Status: DISCONTINUED | OUTPATIENT
Start: 2021-10-07 | End: 2021-10-07 | Stop reason: HOSPADM

## 2021-10-07 RX ORDER — ONDANSETRON 4 MG/1
4 TABLET, ORALLY DISINTEGRATING ORAL EVERY 6 HOURS PRN
Status: DISCONTINUED | OUTPATIENT
Start: 2021-10-07 | End: 2021-10-07 | Stop reason: HOSPADM

## 2021-10-07 NOTE — DISCHARGE INSTRUCTIONS
Discharge Instruction for Undelivered Patients      You were seen for: Labor Assessment  We Consulted: Kathy Angulo CNM  You had (Test or Medicine):fetal and uterine monitoring, cervical exam     Diet:   Drink 8 to 12 glasses of liquids (milk, juice, water) every day.     Activity:  Call your doctor or nurse midwife if your baby is moving less than usual.     Call your provider if you notice:  Swelling in your face or increased swelling in your hands or legs.  Headaches that are not relieved by Tylenol (acetaminophen).  Changes in your vision (blurring: seeing spots or stars.)  Nausea (sick to your stomach) and vomiting (throwing up).   Weight gain of 5 pounds or more per week.  Heartburn that doesn't go away.  Signs of bladder infection: pain when you urinate (use the toilet), need to go more often and more urgently.  The bag of hernandez (rupture of membranes) breaks, or you notice leaking in your underwear.  Bright red blood in your underwear.  Abdominal (lower belly) or stomach pain.  For first baby: Contractions (tightening) less than 5 minutes apart for one hour or more.  Second (plus) baby: Contractions (tightening) less than 10 minutes apart and getting stronger.  *If less than 34 weeks: Contractions (tightening) more than 6 times in one hour.  Increase or change in vaginal discharge (note the color and amount)      Follow-up:  As scheduled in the clinic

## 2021-10-07 NOTE — TELEPHONE ENCOUNTER
"Patient paged CNM on call. Reports that she has been having contractions since 0400. Contractions are about 5 minutes apart. Baby is \"wiggling\". No vaginal bleeding or leaking of fluid. GBS negative. Discussed options and patient prefers to come in and be checked, but needs to arrange for a ride to hospital. Lives 10 minutes from [facility]. Will call her mom and thinks she can be there~ 1 hour. Advised [facility] of patient situation and plan. Asked that they check her cervix upon arrival call me.       AIRAN Yang, CNM  "

## 2021-10-07 NOTE — PROVIDER NOTIFICATION
CNM Kathy Angulo informed of patient arrival and assessment including the following:    Reason for maternal/fetal assessment uterine contractions. Pt reports getting membranes swept in clinic yesterday and feeling crammpy with low back pain since.. Fetal status normal baseline, moderate variability, accelerations present and no decelerations. Plan per provider/orders received for monitor until CNM on unit.

## 2021-10-07 NOTE — PLAN OF CARE
Data: [facility] presented to [facility]: 10/7/2021  7:05 AM.  Reason for maternal/fetal assessment is uterine contractions. Patient reports getting membranes swept in clinic yesterday and feeling crammpy with low back pain since.  Patient is a .  Prenatal record reviewed. Pregnancy has been uncomplicated..  Gestational Age 37w2d. VSS. Fetal movement active. Patient denies leaking of vaginal fluid/rupture of membranes, vaginal bleeding, pelvic pressure, nausea, vomiting, headache, visual disturbances, epigastric or URQ pain, significant edema. Support person is not present.   Action: Verbal consent for EFM. Triage assessment completed. Bill of rights reviewed.  Response: Patient verbalized agreement with plan. Will contact M Kathy Angulo with update and for further orders.

## 2021-10-07 NOTE — PLAN OF CARE
Data: Patient presented to the [facility] at 0700.   Reason for maternal/fetal assessment per patient is Rule Out Labor  . Patient is a . Prenatal record reviewed.      OB History    Para Term  AB Living   3 0 0 0 2 0   SAB TAB Ectopic Multiple Live Births   1 0 0 0 0      # Outcome Date GA Lbr Isidro/2nd Weight Sex Delivery Anes PTL Lv   3 Current            2 SAB  4w0d          1 AB  6w0d    AB, COMPLETE         Medical History:   Past Medical History:   Diagnosis Date     Asthma      Constipation      Depressive disorder     depression and anxiety; pre-pregnancy on zoloft (nothing now)     Ovarian cyst, left      Urinary tract infection      Varicella     vaccinated   . Gestational Age 37w2d. VSS. Cervix: /-2.  Fetal movement present. Patient denies cramping, backache, vaginal discharge, pelvic pressure, UTI symptoms, GI problems, bloody show, vaginal bleeding, edema, headache, visual disturbances, epigastric or URQ pain, abdominal pain, rupture of membranes. Support persons not present.  Action: Verbal consent for EFM. Triage assessment completed. EFM applied for fetal wellbing. Uterine assessment with TOCO. Fetal assessment: Presumed adequate fetal oxygenation documented (see flow record). Patient instructed to report change in fetal movement, vaginal leaking of fluid or bleeding, abdominal pain, or any concerns related to the pregnancy to her nurse/physician.   Response: THEO Pearce informed of tracing and sve. Plan per provider is discharge to home. Patient verbalized understanding of education and verbalized agreement with plan. Discharged ambulatory at 0837.

## 2021-10-07 NOTE — PROVIDER NOTIFICATION
CMN updated on cat 1 tracing and pt status. Orders to recheck cervix around 0820 and discharge to home if no change.

## 2021-10-08 ENCOUNTER — HOSPITAL (OUTPATIENT)
Dept: OBGYN | Facility: CLINIC | Age: 21
End: 2021-10-08

## 2021-10-08 NOTE — PROGRESS NOTES
Late Entry: Visit date was Oct 7 2021.   MATERNAL ASSESSMENT CENTER CNM TRIAGE NOTE    Nicollette M Armijos is a 21 year old  with and IUP at 37w2d who presents with complaint of contractions starting around 0400 and coming about every 5 min.. She denies any vaginal bleeding or LOF and baby is active.      Patient states baby is active.  Denies ROM   Denies vaginal bleeding  Present OB History at [facility] with the CNMs.    Problems this pregnancy:   1. Supervision of normal pregnancy  2. Anxiety  3. Abdominal pain   4. Depression   5. Back pain in pregnancy    ROS:  Patient is alert and oriented      PHYSICAL EXAM:  LMP 2021 (Approximate)     FHT's with moderate variability  Accelerations: present    Decelerations:  absent      Contractions: Pt is licha irregularly with uterine irritation  Cervix 1/90%/-2, med   Abdomen: gravid  Bloody show: none     Membranes  intact     ASSESSMENT :   21 year old  with gutierrez IUP 37w2d not in labor. Cervical exam unchanged x one hour.   NST reactive.   GBS neg    Membranes are not ruptured.     PLAN:  Discharge home  Continue routine prenatal care      Teaching done r/t to s/s of labor, SROM, decreased fetal movement, comfort measures in third trimester.  Instructed to please refer to the discharge handouts, the RN triage line or on-call CNM for any questions or concerns.  Pt verbalizes understanding and agreement with current plan of care.    Portia Bhardwaj CNM

## 2021-10-13 ENCOUNTER — PRENATAL OFFICE VISIT (OUTPATIENT)
Dept: OBGYN | Facility: CLINIC | Age: 21
End: 2021-10-13
Payer: COMMERCIAL

## 2021-10-13 VITALS — DIASTOLIC BLOOD PRESSURE: 64 MMHG | WEIGHT: 146 LBS | SYSTOLIC BLOOD PRESSURE: 102 MMHG | BODY MASS INDEX: 23.57 KG/M2

## 2021-10-13 DIAGNOSIS — Z34.83 ENCOUNTER FOR SUPERVISION OF OTHER NORMAL PREGNANCY, THIRD TRIMESTER: Primary | ICD-10-CM

## 2021-10-13 PROCEDURE — 99207 PR PRENATAL VISIT: CPT | Performed by: ADVANCED PRACTICE MIDWIFE

## 2021-10-13 NOTE — PROGRESS NOTES
S: Endorses lower back pain, pressure with walking, lost mucous plug 4-5 days ago. Having no LOF, headache, epigastric pain, or visual disturbances. Baby active. Interested in scheduling elective induction at 39 weeks if possible.    O: Vitals: /64 (BP Location: Right arm, Cuff Size: Adult Regular)   Wt 66.2 kg (146 lb)   LMP 01/25/2021 (Approximate)   BMI 23.57 kg/m    BMI= Body mass index is 23.57 kg/m .  Exam:  Constitutional: Healthy, alert and no distress  Respiratory: Respirations even and unlabored  Gastrointestinal: Abdomen soft, non-tender. Fundus measures appropriate for gestational age. Fetal heart tones heard without difficulty and within normal limits. Cephalic per leopold's maneuver.   : Normal external genitalia without lesions. VE: 1.5/80%/-1/average/posterior, Sullivan=7  Psychiatric: Mentation appears normal and affect normal/bright    A/P:  (Z34.83) Encounter for supervision of other normal pregnancy, third trimester  (primary encounter diagnosis)  - Reviewed R/B/I for IOL. As cervix unfavorable today, recommend she return by Monday, 10/18/21 to reassess cervix.  - Reviewed labor symptoms, warning signs, when to call.  - Reviewed counting fetal movement.    Encouraged patient to call with any questions or concerns.  Return to clinic in 1 week.    Susana Pretty, DNP, APRN, CNM

## 2021-10-13 NOTE — NURSING NOTE
"Chief Complaint   Patient presents with     Prenatal Care     38w 1d, had ctx last night again, stopped after a few hours +FM       Initial /64 (BP Location: Right arm, Cuff Size: Adult Regular)   Wt 66.2 kg (146 lb)   LMP 2021 (Approximate)   BMI 23.57 kg/m   Estimated body mass index is 23.57 kg/m  as calculated from the following:    Height as of 21: 1.676 m (5' 6\").    Weight as of this encounter: 66.2 kg (146 lb).  BP completed using cuff size: regular    Questioned patient about current smoking habits.  Pt. has never smoked.          The following HM Due: NONE    "

## 2021-10-14 ENCOUNTER — TELEPHONE (OUTPATIENT)
Dept: OBGYN | Facility: CLINIC | Age: 21
End: 2021-10-14

## 2021-10-14 NOTE — TELEPHONE ENCOUNTER
Pt calling back.  Had no further leaking, but now is feeling lots of low pelvic pain/pressure.  Also has more leaking for the past hour, not as much volume but more watery than usual.  Unsure if it is soaking through her underwear.  Denies ctx/cramping now.  Normal FM.    On call CNM, Anne Pretty paged and discussed.    Pt is ok to monitor at this time.  Will CB if leaking soaks her underwear, DFM or ctx start.    Anne will call pt to check in later this evening as well.    Tatyana Diaz RN

## 2021-10-14 NOTE — TELEPHONE ENCOUNTER
Spoke to pt. Nothing has came out after lying down.  She will continue to monitor closely and call with any concern.    Susanne MAYER RN BSN

## 2021-10-15 NOTE — TELEPHONE ENCOUNTER
Called patient to check in re: leaking of fluid. No answer; message left. Encouraged her to call nurse line with any further questions or concerns tonight.    Susana Pretty, MARY, APRN, CNM

## 2021-10-18 ENCOUNTER — LAB (OUTPATIENT)
Dept: LAB | Facility: CLINIC | Age: 21
End: 2021-10-18
Attending: ADVANCED PRACTICE MIDWIFE
Payer: COMMERCIAL

## 2021-10-18 ENCOUNTER — PRENATAL OFFICE VISIT (OUTPATIENT)
Dept: OBGYN | Facility: CLINIC | Age: 21
End: 2021-10-18
Payer: COMMERCIAL

## 2021-10-18 VITALS — BODY MASS INDEX: 23.73 KG/M2 | WEIGHT: 147 LBS | DIASTOLIC BLOOD PRESSURE: 72 MMHG | SYSTOLIC BLOOD PRESSURE: 100 MMHG

## 2021-10-18 DIAGNOSIS — Z34.83 ENCOUNTER FOR SUPERVISION OF OTHER NORMAL PREGNANCY, THIRD TRIMESTER: Primary | ICD-10-CM

## 2021-10-18 DIAGNOSIS — Z34.83 ENCOUNTER FOR SUPERVISION OF OTHER NORMAL PREGNANCY, THIRD TRIMESTER: ICD-10-CM

## 2021-10-18 LAB — SARS-COV-2 RNA RESP QL NAA+PROBE: NEGATIVE

## 2021-10-18 PROCEDURE — 99207 PR PRENATAL VISIT: CPT | Performed by: ADVANCED PRACTICE MIDWIFE

## 2021-10-18 PROCEDURE — U0003 INFECTIOUS AGENT DETECTION BY NUCLEIC ACID (DNA OR RNA); SEVERE ACUTE RESPIRATORY SYNDROME CORONAVIRUS 2 (SARS-COV-2) (CORONAVIRUS DISEASE [COVID-19]), AMPLIFIED PROBE TECHNIQUE, MAKING USE OF HIGH THROUGHPUT TECHNOLOGIES AS DESCRIBED BY CMS-2020-01-R: HCPCS

## 2021-10-18 NOTE — PATIENT INSTRUCTIONS
Patient Education     Labor Induction  What You Need to Know  What is labor induction?  In most cases, it is best to go into labor naturally. When labor does not start on its own, we may use medicine or other methods to start (induce) labor.   This is called an induction of labor. It helps the uterus to contract. It also helps to thin, soften and open the cervix. (The cervix is the opening of the uterus.)  When is induction used?  There are two types of induction:    Medical induction is done to protect the health of the mom or baby. We may start labor if:  ? There are medical concerns for you or your baby.  ? You haven't had your baby by 41 weeks.    Elective induction is done for non-medical reasons. This may be done if:  ? You live far from the hospital.  ? You have been having contractions for many days.  ? You have given birth quickly in the past.  We will not perform an elective induction before 39 weeks. Studies show that babies born before 39 weeks may struggle with breathing, feeding, sleeping and staying warm. They are more likely to have health problems. They may need to stay in the hospital longer.  If we start your labor for medical reasons, the benefits will outweigh these risks.   We will talk to you about your risks, benefits and alternatives (other options) before we start your labor.  What might happen if my labor is induced?  Some of this depends on how your labor is started and how your body responds. Your labor may be more complicated. You and your baby may need more medical treatments. In general:    You may not go into labor right away. If so, we may send you home with follow-up instructions.    You may not be able to move around during labor. You will have two belts with monitors attached to your belly. These allow the nurse to watch your contractions and your baby's heart rate.    Your labor may be longer and more painful. It might take more than one day.    A long labor may increase the  risk of infection in mother and baby.    Your labor may not progress as planned. This could lead to a  birth.  How is induction done?  We may start your labor by doing one or more of the following:    We may place medicine in your vagina, near your cervix. This can help to open and prepare the cervix for labor. It is only used when there are medical reasons to start labor.    After your cervix is ready:  ? We may give you medicine through an IV (a small tube placed in your vein). This medicine is called pitocin. It helps the muscles of your uterus to contract.  ? We may make a small opening in your bag of water (the sac around your baby). This is called an amniotomy. It may help your uterus contract and your cervix open.  Can I plan the date of my delivery?  After 39 weeks, you may ask about planning the date of your delivery. This is only an option if your body--and your baby--are ready.   We will check your cervix and your baby's heart rate.     If you are ready to be induced, we will give you a date and time to come to the hospital. If many mothers are in labor that day, we may need to start your labor at another time.    If you are not ready, we will not start your labor. It will be safer for your baby to come on its own.  What else do I need to know?  Before you have an induction, make sure you understand the reasons, risks and benefits. Ask your doctor or nurse-midwife:    Why do I need to be induced?    What are the risks to my baby?    How will you start my labor?    How will you know if my baby is ready to be born?    How will you know if my body is ready to give birth?  Where can I get more information?  To learn more about induction, you may visit these websites:  The American College of Nurse-Midwives: www.mymidwife.org  The American College of Obstetricians and Gynecologists: www.acog.org  Childbirth Connection: www.childbirthconnection.org  [employer]: www.[employer].Yub  For  informational purposes only. Not to replace the advice of your health care provider.   Copyright   2008 [employer]. All rights reserved. Clinically reviewed by the  [employer] team. [employer] 105584 - REV .  For informational purposes only. Not to replace the advice of your health care provider.  Copyright   2018 [employer]. All rights reserved.

## 2021-10-18 NOTE — PROGRESS NOTES
S: Feels tired of being pregnant. Requesting to be induced.  Baby active.  Denies uterine cramping, vaginal bleeding or leaking of fluid. No headache, increase in edema, no epigastric pain.   O: Vitals: /72 (BP Location: Left arm, Cuff Size: Adult Regular)   Wt 66.7 kg (147 lb)   LMP 2021 (Approximate)   BMI 23.73 kg/m    BMI= Body mass index is 23.73 kg/m .  Exam:  Constitutional: healthy, alert and no distress  Respiratory: respirations even and unlabored  Gastrointestinal: Abdomen soft, non-tender. Fundus measures appropriate for gestational age. Fetal heart tones hear without difficulty and within normal limits  : Normal external genitalia without lesions  Psychiatric: mentation appears normal and affect normal/bright  A:     ICD-10-CM    1. Encounter for supervision of other normal pregnancy, third trimester  Z34.83 Asymptomatic COVID-19 Virus (Coronavirus) by PCR     P: Labor signs and symptoms discussed, aware of numbers to call.  Discussed risk/benefit profile of elective induction, including increased risk of  section  GBS screen completed.   Encouraged patient to call with any questions or concerns.  Return in 2 days for elective induction    ARIAN Yang, CNM

## 2021-10-18 NOTE — NURSING NOTE
"Chief Complaint   Patient presents with     Prenatal Care     38w 6d, c/o floaters turned from \"dark to light\", cervix check       Initial /72 (BP Location: Left arm, Cuff Size: Adult Regular)   Wt 66.7 kg (147 lb)   LMP 2021 (Approximate)   BMI 23.73 kg/m   Estimated body mass index is 23.73 kg/m  as calculated from the following:    Height as of 21: 1.676 m (5' 6\").    Weight as of this encounter: 66.7 kg (147 lb).  BP completed using cuff size: regular    Questioned patient about current smoking habits.  Pt. has never smoked.          The following HM Due: NONE      "

## 2021-10-20 ENCOUNTER — HOSPITAL ENCOUNTER (INPATIENT)
Facility: CLINIC | Age: 21
LOS: 2 days | Discharge: HOME | End: 2021-10-22
Attending: ADVANCED PRACTICE MIDWIFE | Admitting: ADVANCED PRACTICE MIDWIFE
Payer: COMMERCIAL

## 2021-10-20 ENCOUNTER — ANESTHESIA EVENT (OUTPATIENT)
Dept: OBGYN | Facility: CLINIC | Age: 21
End: 2021-10-20
Payer: COMMERCIAL

## 2021-10-20 ENCOUNTER — ANESTHESIA (OUTPATIENT)
Dept: OBGYN | Facility: CLINIC | Age: 21
End: 2021-10-20
Payer: COMMERCIAL

## 2021-10-20 LAB
ABO/RH(D): NORMAL
ANTIBODY SCREEN: NEGATIVE
HGB BLD-MCNC: 10.6 G/DL (ref 11.7–15.7)
SPECIMEN EXPIRATION DATE: NORMAL
T PALLIDUM AB SER QL: NONREACTIVE

## 2021-10-20 PROCEDURE — 370N000003 HC ANESTHESIA WARD SERVICE

## 2021-10-20 PROCEDURE — 250N000011 HC RX IP 250 OP 636: Performed by: ADVANCED PRACTICE MIDWIFE

## 2021-10-20 PROCEDURE — 258N000003 HC RX IP 258 OP 636: Performed by: ADVANCED PRACTICE MIDWIFE

## 2021-10-20 PROCEDURE — 250N000011 HC RX IP 250 OP 636: Performed by: ANESTHESIOLOGY

## 2021-10-20 PROCEDURE — 250N000009 HC RX 250: Performed by: ADVANCED PRACTICE MIDWIFE

## 2021-10-20 PROCEDURE — 86780 TREPONEMA PALLIDUM: CPT | Performed by: ADVANCED PRACTICE MIDWIFE

## 2021-10-20 PROCEDURE — 85018 HEMOGLOBIN: CPT | Performed by: ADVANCED PRACTICE MIDWIFE

## 2021-10-20 PROCEDURE — 250N000009 HC RX 250: Performed by: ANESTHESIOLOGY

## 2021-10-20 PROCEDURE — 86901 BLOOD TYPING SEROLOGIC RH(D): CPT | Performed by: ADVANCED PRACTICE MIDWIFE

## 2021-10-20 PROCEDURE — 120N000001 HC R&B MED SURG/OB

## 2021-10-20 RX ORDER — NALOXONE HYDROCHLORIDE 0.4 MG/ML
0.2 INJECTION, SOLUTION INTRAMUSCULAR; INTRAVENOUS; SUBCUTANEOUS
Status: DISCONTINUED | OUTPATIENT
Start: 2021-10-20 | End: 2021-10-21

## 2021-10-20 RX ORDER — NALOXONE HYDROCHLORIDE 0.4 MG/ML
0.4 INJECTION, SOLUTION INTRAMUSCULAR; INTRAVENOUS; SUBCUTANEOUS
Status: DISCONTINUED | OUTPATIENT
Start: 2021-10-20 | End: 2021-10-21

## 2021-10-20 RX ORDER — BUPIVACAINE HYDROCHLORIDE 2.5 MG/ML
INJECTION, SOLUTION EPIDURAL; INFILTRATION; INTRACAUDAL PRN
Status: DISCONTINUED | OUTPATIENT
Start: 2021-10-20 | End: 2021-10-21

## 2021-10-20 RX ORDER — PROCHLORPERAZINE 25 MG
25 SUPPOSITORY, RECTAL RECTAL EVERY 12 HOURS PRN
Status: DISCONTINUED | OUTPATIENT
Start: 2021-10-20 | End: 2021-10-21

## 2021-10-20 RX ORDER — METOCLOPRAMIDE 10 MG/1
10 TABLET ORAL EVERY 6 HOURS PRN
Status: DISCONTINUED | OUTPATIENT
Start: 2021-10-20 | End: 2021-10-21

## 2021-10-20 RX ORDER — TRANEXAMIC ACID 10 MG/ML
1 INJECTION, SOLUTION INTRAVENOUS EVERY 30 MIN PRN
Status: DISCONTINUED | OUTPATIENT
Start: 2021-10-20 | End: 2021-10-21

## 2021-10-20 RX ORDER — KETOROLAC TROMETHAMINE 30 MG/ML
30 INJECTION, SOLUTION INTRAMUSCULAR; INTRAVENOUS
Status: DISCONTINUED | OUTPATIENT
Start: 2021-10-20 | End: 2021-10-21

## 2021-10-20 RX ORDER — TERBUTALINE SULFATE 1 MG/ML
0.25 INJECTION, SOLUTION SUBCUTANEOUS
Status: DISCONTINUED | OUTPATIENT
Start: 2021-10-20 | End: 2021-10-21

## 2021-10-20 RX ORDER — LIDOCAINE HYDROCHLORIDE AND EPINEPHRINE 15; 5 MG/ML; UG/ML
INJECTION, SOLUTION EPIDURAL PRN
Status: DISCONTINUED | OUTPATIENT
Start: 2021-10-20 | End: 2021-10-21

## 2021-10-20 RX ORDER — OXYTOCIN/0.9 % SODIUM CHLORIDE 30/500 ML
1-24 PLASTIC BAG, INJECTION (ML) INTRAVENOUS CONTINUOUS
Status: DISCONTINUED | OUTPATIENT
Start: 2021-10-20 | End: 2021-10-21

## 2021-10-20 RX ORDER — CARBOPROST TROMETHAMINE 250 UG/ML
250 INJECTION, SOLUTION INTRAMUSCULAR
Status: DISCONTINUED | OUTPATIENT
Start: 2021-10-20 | End: 2021-10-21

## 2021-10-20 RX ORDER — ONDANSETRON 4 MG/1
4 TABLET, ORALLY DISINTEGRATING ORAL EVERY 6 HOURS PRN
Status: DISCONTINUED | OUTPATIENT
Start: 2021-10-20 | End: 2021-10-21

## 2021-10-20 RX ORDER — NALBUPHINE HYDROCHLORIDE 10 MG/ML
2.5-5 INJECTION, SOLUTION INTRAMUSCULAR; INTRAVENOUS; SUBCUTANEOUS EVERY 6 HOURS PRN
Status: DISCONTINUED | OUTPATIENT
Start: 2021-10-20 | End: 2021-10-21

## 2021-10-20 RX ORDER — OXYTOCIN/0.9 % SODIUM CHLORIDE 30/500 ML
100-340 PLASTIC BAG, INJECTION (ML) INTRAVENOUS CONTINUOUS PRN
Status: DISCONTINUED | OUTPATIENT
Start: 2021-10-20 | End: 2021-10-21

## 2021-10-20 RX ORDER — METHYLERGONOVINE MALEATE 0.2 MG/ML
200 INJECTION INTRAVENOUS
Status: DISCONTINUED | OUTPATIENT
Start: 2021-10-20 | End: 2021-10-21

## 2021-10-20 RX ORDER — ACETAMINOPHEN 325 MG/1
650 TABLET ORAL EVERY 4 HOURS PRN
Status: DISCONTINUED | OUTPATIENT
Start: 2021-10-20 | End: 2021-10-21

## 2021-10-20 RX ORDER — PROCHLORPERAZINE MALEATE 10 MG
10 TABLET ORAL EVERY 6 HOURS PRN
Status: DISCONTINUED | OUTPATIENT
Start: 2021-10-20 | End: 2021-10-21

## 2021-10-20 RX ORDER — OXYTOCIN 10 [USP'U]/ML
10 INJECTION, SOLUTION INTRAMUSCULAR; INTRAVENOUS
Status: DISCONTINUED | OUTPATIENT
Start: 2021-10-20 | End: 2021-10-21

## 2021-10-20 RX ORDER — SODIUM CHLORIDE, SODIUM LACTATE, POTASSIUM CHLORIDE, CALCIUM CHLORIDE 600; 310; 30; 20 MG/100ML; MG/100ML; MG/100ML; MG/100ML
INJECTION, SOLUTION INTRAVENOUS CONTINUOUS
Status: DISCONTINUED | OUTPATIENT
Start: 2021-10-20 | End: 2021-10-21

## 2021-10-20 RX ORDER — LIDOCAINE 40 MG/G
CREAM TOPICAL
Status: DISCONTINUED | OUTPATIENT
Start: 2021-10-20 | End: 2021-10-21

## 2021-10-20 RX ORDER — OXYTOCIN/0.9 % SODIUM CHLORIDE 30/500 ML
340 PLASTIC BAG, INJECTION (ML) INTRAVENOUS CONTINUOUS PRN
Status: DISCONTINUED | OUTPATIENT
Start: 2021-10-20 | End: 2021-10-21

## 2021-10-20 RX ORDER — METOCLOPRAMIDE HYDROCHLORIDE 5 MG/ML
10 INJECTION INTRAMUSCULAR; INTRAVENOUS EVERY 6 HOURS PRN
Status: DISCONTINUED | OUTPATIENT
Start: 2021-10-20 | End: 2021-10-21

## 2021-10-20 RX ORDER — MISOPROSTOL 200 UG/1
400 TABLET ORAL
Status: DISCONTINUED | OUTPATIENT
Start: 2021-10-20 | End: 2021-10-21

## 2021-10-20 RX ORDER — IBUPROFEN 600 MG/1
600 TABLET, FILM COATED ORAL
Status: DISCONTINUED | OUTPATIENT
Start: 2021-10-20 | End: 2021-10-21

## 2021-10-20 RX ORDER — FENTANYL CITRATE 50 UG/ML
50-100 INJECTION, SOLUTION INTRAMUSCULAR; INTRAVENOUS
Status: DISCONTINUED | OUTPATIENT
Start: 2021-10-20 | End: 2021-10-21

## 2021-10-20 RX ORDER — EPHEDRINE SULFATE 50 MG/ML
5 INJECTION, SOLUTION INTRAMUSCULAR; INTRAVENOUS; SUBCUTANEOUS
Status: DISCONTINUED | OUTPATIENT
Start: 2021-10-20 | End: 2021-10-21

## 2021-10-20 RX ORDER — FENTANYL CITRATE-0.9 % NACL/PF 10 MCG/ML
100 PLASTIC BAG, INJECTION (ML) INTRAVENOUS EVERY 5 MIN PRN
Status: DISCONTINUED | OUTPATIENT
Start: 2021-10-20 | End: 2021-10-21

## 2021-10-20 RX ORDER — ONDANSETRON 2 MG/ML
4 INJECTION INTRAMUSCULAR; INTRAVENOUS EVERY 6 HOURS PRN
Status: DISCONTINUED | OUTPATIENT
Start: 2021-10-20 | End: 2021-10-21

## 2021-10-20 RX ORDER — MISOPROSTOL 200 UG/1
800 TABLET ORAL
Status: DISCONTINUED | OUTPATIENT
Start: 2021-10-20 | End: 2021-10-21

## 2021-10-20 RX ADMIN — FENTANYL CITRATE 100 MCG: 50 INJECTION INTRAMUSCULAR; INTRAVENOUS at 18:52

## 2021-10-20 RX ADMIN — FENTANYL CITRATE 50 MCG: 50 INJECTION INTRAMUSCULAR; INTRAVENOUS at 16:37

## 2021-10-20 RX ADMIN — FENTANYL CITRATE 100 MCG: 50 INJECTION INTRAMUSCULAR; INTRAVENOUS at 20:04

## 2021-10-20 RX ADMIN — SODIUM CHLORIDE, POTASSIUM CHLORIDE, SODIUM LACTATE AND CALCIUM CHLORIDE: 600; 310; 30; 20 INJECTION, SOLUTION INTRAVENOUS at 12:24

## 2021-10-20 RX ADMIN — Medication 2 MILLI-UNITS/MIN: at 12:40

## 2021-10-20 RX ADMIN — ONDANSETRON 4 MG: 2 INJECTION INTRAMUSCULAR; INTRAVENOUS at 19:28

## 2021-10-20 RX ADMIN — Medication: at 20:28

## 2021-10-20 RX ADMIN — FENTANYL CITRATE 100 MCG: 50 INJECTION INTRAMUSCULAR; INTRAVENOUS at 17:46

## 2021-10-20 RX ADMIN — BUPIVACAINE HYDROCHLORIDE 5 ML: 2.5 INJECTION, SOLUTION EPIDURAL; INFILTRATION; INTRACAUDAL at 20:32

## 2021-10-20 RX ADMIN — BUPIVACAINE HYDROCHLORIDE 5 ML: 2.5 INJECTION, SOLUTION EPIDURAL; INFILTRATION; INTRACAUDAL at 20:28

## 2021-10-20 RX ADMIN — LIDOCAINE HYDROCHLORIDE,EPINEPHRINE BITARTRATE 3 ML: 15; .005 INJECTION, SOLUTION EPIDURAL; INFILTRATION; INTRACAUDAL; PERINEURAL at 20:25

## 2021-10-20 ASSESSMENT — ACTIVITIES OF DAILY LIVING (ADL)
ADLS_ACUITY_SCORE: 3

## 2021-10-20 ASSESSMENT — ENCOUNTER SYMPTOMS: DYSRHYTHMIAS: 0

## 2021-10-20 NOTE — PROGRESS NOTES
CNM PROGRESS NOTE    SUBJECTIVE:  Patient feeling some discomfort, tried Nitrous and did not tolerate. Has had x1 dose fentanyl and feeling relief.  Would like SVE.     OBJECTIVE:  /60   Temp 98  F (36.7  C) (Oral)   Resp 16   LMP 01/25/2021 (Approximate)     Fetal heart tones: Baseline 135   Variability: moderate   Accelerations: present  Decelerations: absent    Contractions: Pt is licha every 2-3 minutes, lasting 60-80 seconds and palpates moderate    Cervix: 2.5/ 90% / -1, Vtx  ROM: not ruptured    Pitocin- 8 mu/min.  Antibiotics- none  Cervical ripening: N/A    ASSESSMENT:  IUP @ 39w1d early labor IOL elective   GBS- negative     PLAN:   comfort measures prn has birthing ball in room   Pain medication planning labor epidural when in active labor   Continue Labor augmentation with Pitocin  Discussed AROM, will wait at this time for more cervical change and active labor. Discussed risk of infection with prolonged rupture.   reevaluate in 2-4 hours/PRN    ARIAN Schulte CNM

## 2021-10-20 NOTE — H&P
THEO Labor Admission History & Physical    Nicollette M Armijos is a 21 year old  with an IUP at 39w1d  ; partnered,   Partner/support Person: Faustino   Language Barrier: English  Clinic: [facility]   Provider: Michael Lara     Nicollette M Armijos is admitted to the Birthplace at [facility] on 10/20/2021 at 12:07 PM       History of present inllness/Chief Complaint:    Here for: elective IOL   Patient reports contractions are Occasional       Baby active Yes  Membranes are intact.  Bloody show No   Reports last two days has had more vaginal discharge and light spotting, associated with occasional contractions. Has not continued to leak.   Any changes with medical history since last prenatal visit No  Declines syphilis screening.      Obstetrical history  Estimated Date of Delivery: Oct 26, 2021 determined by LMP and early dating ultrasound   Patient's last menstrual period was 2021 (approximate).   Dating U/S: 21    Fetal anatomic survey: Normal  Placenta: anterior     PRENATAL COURSE  Prenatal care began at 12 wks gestation for a total of 12 prenatal visits.  Total wt gain 15; There is no height or weight on file to calculate BMI.  Prenatal Blood Pressure: WNL  Prenatal course was   complicated by    Patient Active Problem List    Diagnosis Date Noted     Labor and delivery indication for care or intervention 10/20/2021     Priority: Medium     Bacterial vaginosis in pregnancy 2021     Priority: Medium     Dx in triage , 35w3d ega  Rx sent for flagyl         Back pain affecting pregnancy in third trimester 2021     Priority: Medium     Seen in MAC . Lower abdominal and mid to lower back pain. Fetal ultrasound WNL.        Encounter for triage in pregnant patient 2021     Priority: Medium     Uterine size-date discrepancy in third trimester 2021     Priority: Medium     Size < dates  Growth US ordered at 32wk visit  EFW 41%, 4lb 8oz       Indication  for care in labor or delivery 08/22/2021     Priority: Medium     Nausea/vomiting in pregnancy 04/26/2021     Priority: Medium     Reglan not effective  Rx given for Zofran at 13wks       Encounter for supervision of other normal pregnancy, third trimester 04/19/2021     Priority: Medium       Clinic/Hospital: [facility]  Partner Name: Faustino  Ultrasound predicts sex: male  Childrens names/ages: 7 yo stepson-- Bently, lives with Jennifer 1/2 time  Previous labor experiences: n/a  Waterbirth (interest, declined, ineligible): n/a  Level of education/occupation: 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, 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 before baby is born  Pertinent History:   PP contraception: NFP  Hx PPD/Depression/Anxiety: depression/anxiety, d/c'd Zoloft prior to pregnancy, open to counseling   Birth Ed: Discussed free videos available through [facility]  Plans for labor pain management: planning epidural  Plans for post partum recovery/time off: 6wks off,  Faustino will have 2wks off, baby will be watched by Lorrie's friend who is a SAHM  Feeding preference: formula  Breast pump: na  Peds provider:  [facility]  Car seat: has, base installed  Circumcision: planning  Discussed 2 week visit:   Tdap:   Flu:   Covid vaccine: completed       Anxiety during pregnancy 04/19/2021     Priority: Medium     Discontinued Zoloft approx 6-7 before pregnancy due to her symptoms being stable  BH referral placed at IOB [x]  Open to meds later in pregnancy if needed       BRIANNA (generalized anxiety disorder) 11/04/2017     Priority: Medium     Asthma, exercise induced 03/15/2014     Priority: Medium     Abdominal pain, generalized 09/24/2013     Priority: Medium     Formatting of this note might be different from the original.  Had Colonoscopy and EGD at [facility] on   11/14/13.  EGD showed small degree of irritation in the lower esophagus.    Colonoscopy was normal.  May have constipation issues.  Recommendation was   to use  omeprazole 20 mg daily for a couple of months, and Miralax 17 gm   daily until constipation resolves.  See EMR for details.       Major depressive disorder, single episode, moderate (H) 08/13/2012     Priority: Medium     Formatting of this note might be different from the original.  Jannet Sheth at [facility].    Phone number [phone].       Tdap: given   Rhogam: O+    Patient Active Problem List   Diagnosis     Abdominal pain, generalized     Asthma, exercise induced     BRIANNA (generalized anxiety disorder)     Major depressive disorder, single episode, moderate (H)     Encounter for supervision of other normal pregnancy, third trimester     Anxiety during pregnancy     Nausea/vomiting in pregnancy     Indication for care in labor or delivery     Uterine size-date discrepancy in third trimester     Encounter for triage in pregnant patient     Back pain affecting pregnancy in third trimester     Bacterial vaginosis in pregnancy     Labor and delivery indication for care or intervention       HISTORY  Allergies   Allergen Reactions     Penicillins Nausea and Vomiting     Milk-Related Compounds Unknown     Past Medical History:   Diagnosis Date     Asthma      Constipation      Depressive disorder     depression and anxiety; pre-pregnancy on zoloft (nothing now)     Ovarian cyst, left      Urinary tract infection      Varicella     vaccinated     Past Surgical History:   Procedure Laterality Date     DILATION AND CURETTAGE SUCTION N/A 09/25/2014    Procedure: DILATION AND CURETTAGE SUCTION;  Surgeon: Petra Chase MD;  Location: [facility] OR     ESOPHAGOSCOPY, GASTROSCOPY, DUODENOSCOPY (EGD), COMBINED  11/14/2013    Procedure: COMBINED ESOPHAGOSCOPY, GASTROSCOPY, DUODENOSCOPY (EGD);  COLONOSCOPY & ESOPHAGOSCOPY, GASTROSCOPY, DUODENOSCOPY (EGD);  Surgeon: Esthela Regalado MD;  Location: [facility] OR     INSERT INTRAUTERINE DEVICE N/A 09/25/2014    Procedure: INSERT INTRAUTERINE DEVICE;   Surgeon: Petra Chase MD;  Location: [facility] OR     SIGMOIDOSCOPY FLEXIBLE  2013    Procedure: SIGMOIDOSCOPY FLEXIBLE;;  Surgeon: Esthela Regalado MD;  Location: [facility] OR     TONSILLECTOMY       Family History   Problem Relation Age of Onset     No Known Problems Mother      Heart Disease Father      Depression Father      No Known Problems Brother      Ovarian Cancer Maternal Grandmother      Colon Cancer Maternal Grandmother      No Known Problems Paternal Grandmother      Diabetes Paternal Grandfather      Heart Disease Paternal Grandfather      Diabetes Brother      Social History     Tobacco Use     Smoking status: Never Smoker     Smokeless tobacco: Never Used   Substance Use Topics     Alcohol use: No     OB History    Para Term  AB Living   3 0 0 0 2 0   SAB TAB Ectopic Multiple Live Births   1 0 0 0 0      # Outcome Date GA Lbr Isidro/2nd Weight Sex Delivery Anes PTL Lv   3 Current            2 SAB  4w0d          1 AB  6w0d    AB, COMPLETE          LABS:  Lab Results   Component Value Date    ABO O 2021    RH Pos 2021    AS Neg 2021    HGB 10.7 (L) 2021    HEPBANG Nonreactive 2021    CHPCRT  2014     Negative   Negative for C. trachomatis rRNA by transcription mediated amplification.   A negative result by transcription mediated amplification does not preclude the   presence of C. trachomatis infection because results are dependent on proper   and adequate collection, absence of inhibitors, and sufficient rRNA to be   detected.      GCPCRT  2014     Negative   Negative for N. gonorrhoeae rRNA by transcription mediated amplification.   A negative result by transcription mediated amplification does not preclude the   presence of N. gonorrhoeae infection because results are dependent on proper   and adequate collection, absence of inhibitors, and sufficient rRNA to be   detected.         GBS Status:   No results found for: GBS  Rubella:  Immune    HIV: Non-Reactive   Platelets:  228  1hr GCT:  109    ROS   Pt is alert and oriented  Pt denies significant constitutional symptoms including fever and/or malaise.    Pt denies significant respiratory, cardiovacular, GI, or muscular/skeletal complaints.    Neuro: Denies HA and visual changes, reflexes +2 no clonus   Muscoloskeletal: Denies except for discomforts r/t pregnancy     PHYSICAL EXAM:  /67   Temp 98  F (36.7  C) (Oral)   Resp 18   LMP 2021 (Approximate)   General appearance:  healthy, alert and active   Heart: RRR  Lungs: CTA bilaterally, normal respiratory effort  Abdomen: gravid, single vertex fetus via leopolds, non-tender, EFW 7 lbs.   Legs: reflexes 2+ bilaterally, no clonus, no edema     Contractions: Pt is not licha    Fetal heart tones: Baseline 140   Variability: moderate   Accelerations: present  Decelerations: absent    NST: reactive    Cervix: 2/ 90% / Posterior/ soft/ -2, Vtx  Bloody show: no  Membranes:  Intact     ASSESSMENT:  21 year old  with gutierrez IUP 39w1d for induction of labor.  Indication elective   NST reactive  GBS negative and membranes intact    PLAN:  Routine CN care  Labs ordered: Hemoglobin and type and screen  Teaching done r/t comfort measures, pain management options, and labor processes.   Admit - see IP orders  Labor induction with Pitocin, discussed risks and benefits patient and partner    Anticipate ALL Christie, ARIAN CNM

## 2021-10-20 NOTE — PLAN OF CARE
Data: Patient presented to Birthplace: 10/20/2021 11:03 AM.  Reason for maternal/fetal assessment is elective IOL. Patient reports prodromal labor.  Patient is a .  Prenatal record reviewed. Pregnancy has been uncomplicated..  Gestational Age 39w1d. VSS. Fetal movement active. Patient denies uterine contractions, leaking of vaginal fluid/rupture of membranes, vaginal bleeding, abdominal pain, pelvic pressure, nausea, vomiting, headache, visual disturbances, epigastric or URQ pain, significant edema. Support person is present.   Action: Verbal consent for EFM. Triage assessment completed. Bill of rights reviewed.  Response: Patient verbalized agreement with plan. Will contact THEO Christie with update and for further orders.     THEO Christie informed of patient arrival and assessment including the following:    Reason for maternal/fetal assessment Elective IOL. Pt reports prodromal labor. SVE tight 2/90/-2. Fetal status normal baseline, moderate variability, accelerations present and no decelerations. Plan per provider/orders received for come discuss and make POC with patient.

## 2021-10-20 NOTE — PROVIDER NOTIFICATION
10/20/21 1224   Provider Notification   Provider Name/Title Lopez CNM   Method of Notification At Bedside   Request Evaluate in Person   Notification Reason Status Update;Other (Comment)  (POC)   Jasmyn LAZO CNM at bedside. POC made for pitocin augmentation at this time. OK for regular diet, encouraged light meals at this time. OK for epidural when patient is ready. Will continue to monitor and update as needed.

## 2021-10-21 PROCEDURE — 250N000009 HC RX 250: Performed by: ADVANCED PRACTICE MIDWIFE

## 2021-10-21 PROCEDURE — 3E033VJ INTRODUCTION OF OTHER HORMONE INTO PERIPHERAL VEIN, PERCUTANEOUS APPROACH: ICD-10-PCS | Performed by: ADVANCED PRACTICE MIDWIFE

## 2021-10-21 PROCEDURE — 258N000003 HC RX IP 258 OP 636: Performed by: ADVANCED PRACTICE MIDWIFE

## 2021-10-21 PROCEDURE — 10907ZC DRAINAGE OF AMNIOTIC FLUID, THERAPEUTIC FROM PRODUCTS OF CONCEPTION, VIA NATURAL OR ARTIFICIAL OPENING: ICD-10-PCS | Performed by: ADVANCED PRACTICE MIDWIFE

## 2021-10-21 PROCEDURE — 59400 OBSTETRICAL CARE: CPT | Performed by: ADVANCED PRACTICE MIDWIFE

## 2021-10-21 PROCEDURE — 250N000011 HC RX IP 250 OP 636: Performed by: ANESTHESIOLOGY

## 2021-10-21 PROCEDURE — 722N000001 HC LABOR CARE VAGINAL DELIVERY SINGLE

## 2021-10-21 PROCEDURE — 250N000013 HC RX MED GY IP 250 OP 250 PS 637: Performed by: ADVANCED PRACTICE MIDWIFE

## 2021-10-21 PROCEDURE — 250N000011 HC RX IP 250 OP 636: Performed by: ADVANCED PRACTICE MIDWIFE

## 2021-10-21 PROCEDURE — 120N000001 HC R&B MED SURG/OB

## 2021-10-21 RX ORDER — HYDROCORTISONE 2.5 %
CREAM (GRAM) TOPICAL 3 TIMES DAILY PRN
Status: DISCONTINUED | OUTPATIENT
Start: 2021-10-21 | End: 2021-10-22 | Stop reason: HOSPADM

## 2021-10-21 RX ORDER — BISACODYL 10 MG
10 SUPPOSITORY, RECTAL RECTAL DAILY PRN
Status: DISCONTINUED | OUTPATIENT
Start: 2021-10-21 | End: 2021-10-22 | Stop reason: HOSPADM

## 2021-10-21 RX ORDER — IBUPROFEN 800 MG/1
800 TABLET, FILM COATED ORAL EVERY 6 HOURS PRN
Status: DISCONTINUED | OUTPATIENT
Start: 2021-10-21 | End: 2021-10-22 | Stop reason: HOSPADM

## 2021-10-21 RX ORDER — OXYTOCIN/0.9 % SODIUM CHLORIDE 30/500 ML
340 PLASTIC BAG, INJECTION (ML) INTRAVENOUS CONTINUOUS PRN
Status: DISCONTINUED | OUTPATIENT
Start: 2021-10-21 | End: 2021-10-22 | Stop reason: HOSPADM

## 2021-10-21 RX ORDER — ACETAMINOPHEN 325 MG/1
650 TABLET ORAL EVERY 4 HOURS PRN
Status: DISCONTINUED | OUTPATIENT
Start: 2021-10-21 | End: 2021-10-22 | Stop reason: HOSPADM

## 2021-10-21 RX ORDER — TRANEXAMIC ACID 10 MG/ML
1 INJECTION, SOLUTION INTRAVENOUS EVERY 30 MIN PRN
Status: DISCONTINUED | OUTPATIENT
Start: 2021-10-21 | End: 2021-10-22 | Stop reason: HOSPADM

## 2021-10-21 RX ORDER — DOCUSATE SODIUM 100 MG/1
100 CAPSULE, LIQUID FILLED ORAL DAILY
Status: DISCONTINUED | OUTPATIENT
Start: 2021-10-21 | End: 2021-10-22 | Stop reason: HOSPADM

## 2021-10-21 RX ORDER — METHYLERGONOVINE MALEATE 0.2 MG/ML
200 INJECTION INTRAVENOUS
Status: DISCONTINUED | OUTPATIENT
Start: 2021-10-21 | End: 2021-10-22 | Stop reason: HOSPADM

## 2021-10-21 RX ORDER — MISOPROSTOL 200 UG/1
400 TABLET ORAL
Status: DISCONTINUED | OUTPATIENT
Start: 2021-10-21 | End: 2021-10-22 | Stop reason: HOSPADM

## 2021-10-21 RX ORDER — OXYTOCIN 10 [USP'U]/ML
10 INJECTION, SOLUTION INTRAMUSCULAR; INTRAVENOUS
Status: DISCONTINUED | OUTPATIENT
Start: 2021-10-21 | End: 2021-10-22 | Stop reason: HOSPADM

## 2021-10-21 RX ORDER — CARBOPROST TROMETHAMINE 250 UG/ML
250 INJECTION, SOLUTION INTRAMUSCULAR
Status: DISCONTINUED | OUTPATIENT
Start: 2021-10-21 | End: 2021-10-22 | Stop reason: HOSPADM

## 2021-10-21 RX ORDER — MISOPROSTOL 200 UG/1
800 TABLET ORAL
Status: DISCONTINUED | OUTPATIENT
Start: 2021-10-21 | End: 2021-10-22 | Stop reason: HOSPADM

## 2021-10-21 RX ORDER — MODIFIED LANOLIN
OINTMENT (GRAM) TOPICAL
Status: DISCONTINUED | OUTPATIENT
Start: 2021-10-21 | End: 2021-10-22 | Stop reason: HOSPADM

## 2021-10-21 RX ADMIN — ACETAMINOPHEN 650 MG: 325 TABLET, FILM COATED ORAL at 00:03

## 2021-10-21 RX ADMIN — IBUPROFEN 800 MG: 800 TABLET, FILM COATED ORAL at 05:56

## 2021-10-21 RX ADMIN — DOCUSATE SODIUM 100 MG: 100 CAPSULE, LIQUID FILLED ORAL at 08:31

## 2021-10-21 RX ADMIN — ACETAMINOPHEN 650 MG: 325 TABLET, FILM COATED ORAL at 15:23

## 2021-10-21 RX ADMIN — BENZOCAINE: 11.4 AEROSOL, SPRAY TOPICAL at 19:59

## 2021-10-21 RX ADMIN — ACETAMINOPHEN 650 MG: 325 TABLET, FILM COATED ORAL at 06:29

## 2021-10-21 RX ADMIN — IBUPROFEN 800 MG: 800 TABLET, FILM COATED ORAL at 19:56

## 2021-10-21 RX ADMIN — LIDOCAINE HYDROCHLORIDE 10 ML: 10 INJECTION, SOLUTION EPIDURAL; INFILTRATION; INTRACAUDAL; PERINEURAL at 05:05

## 2021-10-21 RX ADMIN — ACETAMINOPHEN 650 MG: 325 TABLET, FILM COATED ORAL at 19:57

## 2021-10-21 RX ADMIN — IBUPROFEN 800 MG: 800 TABLET, FILM COATED ORAL at 12:49

## 2021-10-21 RX ADMIN — ONDANSETRON 4 MG: 2 INJECTION INTRAMUSCULAR; INTRAVENOUS at 01:18

## 2021-10-21 RX ADMIN — SODIUM CHLORIDE, POTASSIUM CHLORIDE, SODIUM LACTATE AND CALCIUM CHLORIDE: 600; 310; 30; 20 INJECTION, SOLUTION INTRAVENOUS at 01:00

## 2021-10-21 RX ADMIN — ACETAMINOPHEN 650 MG: 325 TABLET, FILM COATED ORAL at 23:34

## 2021-10-21 RX ADMIN — Medication: at 03:12

## 2021-10-21 ASSESSMENT — ACTIVITIES OF DAILY LIVING (ADL)
ADLS_ACUITY_SCORE: 3

## 2021-10-21 NOTE — PROGRESS NOTES
CNM PROGRESS NOTE    SUBJECTIVE:  Breathing through contractions partner at bedside providing labor support. Has had three doses of Fentanyl and considering labor epidural. Would like SVE.     OBJECTIVE:  /60   Temp 98.1  F (36.7  C) (Oral)   Resp (P) 17   LMP 01/25/2021 (Approximate)     Fetal heart tones: Baseline 120  Variability: moderate   Accelerations: present  Decelerations: absent    Contractions: Pt is licha every 1.5-2 minutes, lasting 70-90 seconds and palpates moderate    Cervix: 3/ 90% / -1, Vtx  ROM: not ruptured    Pitocin- 8 mu/min.  Antibiotics- none  Cervical ripening: N/A    ASSESSMENT:  IUP @ 39w1d minimal/no progress, elective IOL   GBS- negative     PLAN:     comfort measures prn   Pain medication patient desires labor epidural at this time   Continue Labor induction with Pitocin, monitor contractions closely, borderline tachysystole   reevaluate in 2-4 hours/PRN    ARIAN Schulte CNM

## 2021-10-21 NOTE — PROVIDER NOTIFICATION
10/20/21 2117   Provider Notification   Provider Name/Title Jasmyn VENEGAS   Method of Notification At Bedside   Request Evaluate in Person   Notification Reason Membrane Status   Jasmyn LAZO CNM at bedside to evaluate.  SVE and AROM performed with clear fluids. SVE 3/90/-1

## 2021-10-21 NOTE — PROVIDER NOTIFICATION
10/21/21 0139   Provider Notification   Provider Name/Title THEO Vines   Method of Notification At Bedside   Request Evaluate in Person   Notification Reason SVE;Status Update     CNM at bedside. SVE 9/90/2. Pt feeling constant pressure and intermittent contraction pain, utilizing PCEA. Will reassess cervix in 30 minutes.

## 2021-10-21 NOTE — PROGRESS NOTES
CNM PROGRESS NOTE    SUBJECTIVE:  Comfortable with labor epidural. Having low back pain. Agreeable to SVE, partner supportive at bedside.     OBJECTIVE:  BP 95/53   Temp 98.5  F (36.9  C) (Oral)   Resp 16   LMP 01/25/2021 (Approximate)   SpO2 100%     Fetal heart tones: Baseline 115   Variability: moderate   Accelerations: present  Decelerations: present early and variable, while in side lying release had variable deceleration into 90s corrected with positional change     Contractions: Pt is licha every 1.5-2 minutes, lasting 50-60 seconds and palpates strong    Cervix: 5.5/ 90% / -1, Vtx, suspect ROT   ROM: clear fluid AROM @2112    Pitocin- 12 mu/min.  Antibiotics- none  Cervical ripening: low dose pitocin    ASSESSMENT:  IUP @ 39w2d active labor elective IOL   GBS- negative     PLAN:   Performed side lying release to facilitate fetal rotation   Continue Labor augmentation with Pitocin  reevaluate in 2-4 hours/PRN    ARIAN Schulte CNM

## 2021-10-21 NOTE — PROVIDER NOTIFICATION
10/21/21 0032   Provider Notification   Provider Name/Title THEO Vines   Method of Notification In Department   Request Evaluate - Remote   Notification Reason Uterine Activity     RN started fluid bolus of 500 mL LR due to uterine tachysystole. CNM states to decrease pitocin by half at this time. CNM reviewed FHR tracing, intermittent variable decels present. Update in 30 minutes if contraction pattern is unchanged.

## 2021-10-21 NOTE — PROVIDER NOTIFICATION
10/20/21 2328   Provider Notification   Provider Name/Title THEO Vines   Method of Notification At Bedside   Request Evaluate in Person   Notification Reason Status Update;SVE     CNM at bedside. SVE 5.5/90/-1. Occiput transverse. Position changes implemented, side lying release. Pt comfortable with her epidural but experiencing back pain and a mild headache, will give Tylenol. Notify CNM when appropriate for next cervical exam.

## 2021-10-21 NOTE — PROVIDER NOTIFICATION
10/21/21 0130   Provider Notification   Provider Name/Title THEO Vines   Method of Notification Phone   Request Evaluate in Person   Notification Reason Status Update     CNM updated. Pt now reporting constant rectal pressure. CNM will come to bedside for SVE.

## 2021-10-21 NOTE — PLAN OF CARE
Meeting all expected goals. Voiding adequately. Discomfort controlled with pharm and non-pharm interventions. Pt is up ambulating in room and bonding is evident with baby. SO at bedside, supportive and involved.

## 2021-10-21 NOTE — PROVIDER NOTIFICATION
10/21/21 0232   Provider Notification   Provider Name/Title THEO Vines   Method of Notification At Bedside   Request Evaluate in Person   Notification Reason SVE;Status Update     CNM at bedside. SVE 10/100/2. Will begin pushing.

## 2021-10-21 NOTE — L&D DELIVERY NOTE
OB Vaginal Delivery Note    Nicollette M Armijos MRN# [id number]   Age: 21 year old YOB: 2000       GA: 39w2d  GP:   Labor Complications: None   EBL:   mL  Delivery QBL: 65 mL  Delivery Type: Vaginal, Spontaneous   ROM to Delivery Time: (Delivered) Hours: 7 Minutes: 40   Weight:   7lb 6oz   1 Minute 5 Minute 10 Minute   Apgar Totals: 9   9        PIPPA BATES;JUAN GARG     Delivery Details:  Nicollette M Armijos, a 21 year old  female delivered a viable infant with apgars of 9  and 9 . Patient was fully dilated and pushing after 2   hours   7 minutes in active labor. Delivery was via vaginal, spontaneous  to a sterile field under epidural  anesthesia. Infant delivered in vertex  left  occiput  anterior  position through loose nuchal cord. Anterior and posterior shoulders delivered without difficulty. The cord was clamped, cut twice and 3 vessels  were noted. Cord blood was obtained in routine fashion with the following disposition: lab .      Cord complications: nuchal   Placenta delivered at 10/21/2021  4:58 AM . Placental disposition was Hospital disposal . Fundal massage performed and fundus found to be firm.     Episiotomy: none    Perineum, vagina, cervix were inspected, and the following lacerations were noted:   Perineal lacerations: 1st     labial laceration: right         Lower skin laceration left perineum at 4 o'clock position was repaired with 4-0 vicryl interrupted.   Any lacerations were repaired in the usual fashion using 3-0 vicryl and 4-0 vicryl.    Excellent hemostasis was noted. Needle count correct. Infant and patient in delivery room in good and stable condition.        Armijos, Male-Nicollette [[id number]]    Labor Event Times    Labor onset date: 10/20/21 Onset time: 11:31 PM   Dilation complete date: 10/21/21 Complete time:  2:34 AM   Start pushing date/time: 10/21/2021 0245      Labor Events     labor?: No   steroids:  None  Labor Type: Induction/Cervical ripening, AROM  Predominate monitoring during 1st stage: continuous electronic fetal monitoring     Antibiotics received during labor?: No     Rupture date/time: 10/20/21 2112   Rupture type: Artificial Rupture of Membranes  Fluid color: Clear  Fluid odor: Normal     Induction date/time:     Cervical ripening date/time:     Indications for induction: Elective     Augmentation: Oxytocin     Delivery/Placenta Date and Time    Delivery Date: 10/21/21 Delivery Time:  4:52 AM   Placenta Date/Time: 10/21/2021  4:58 AM  Delivering clinician: Jasmyn Christie APRN CNM   Other personnel present at delivery:  Provider Role   Savannah Bates RN Delivery Nurse   Liv Pepe RN Registered Nurse         Vaginal Counts     Initial count performed by 2 team members:  Two Team Members   THEO Oconnor RN       Needles Suture Needles Sponges (RETIRED) Instruments   Initial counts 2  5    Added to count  3     Relief counts       Final counts 2 3 5          Placed during labor Accounted for at the end of labor   FSE No NA   IUPC No NA   Cervadil No NA              Final count performed by 2 team members:  Two Team Members   THEO Oconnor RN      Final count correct?: Yes     Apgars    Living status: Living   1 Minute 5 Minute 10 Minute 15 Minute 20 Minute   Skin color: 1  1       Heart rate: 2  2       Reflex irritability: 2  2       Muscle tone: 2  2       Respiratory effort: 2  2       Total: 9  9       Apgars assigned by: RAYMOND BATES RN     Cord    Vessels: 3 Vessels    Cord Complications: Nuchal   Nuchal Intervention: delivered through         Nuchal cord description: loose nuchal cord         Cord Blood Disposition: Lab    Gases Sent?: No    Delayed cord clamping?: Yes    Cord Clamping Delay (seconds):  seconds    Stem cell collection?: No        Resuscitation    Methods: None     Skin to Skin and Feeding Plan     Skin to skin initiation date/time: 1/10/1841    Skin to skin with: Mother  Skin to skin end date/time:        Labor Events and Shoulder Dystocia    Fetal Tracing Prior to Delivery: Category 2  Shoulder dystocia present?: Neg     Delivery (Maternal) (Provider to Complete) ([id number])    Episiotomy: None  Perineal lacerations: 1st Repaired?: Yes   Labial laceration: right Repaired?: Yes   Repair suture: 3-0 Vicryl, 4-0 Vicryl  Number of repair packets: 3  Genital tract inspection done: Pos     Blood Loss  Mother: Lorrie Soria #[id number]   Start of Mother's Information    Delivery Blood Loss  10/20/21 2331 - 10/21/21 0548    Delivery QBL (mL) Hospital Encounter 65 mL    Total  65 mL         End of Mother's Information  Mother: Lorrie Soria #[id number]          Delivery - Provider to Complete ([id number])    Delivering clinician: Jasmyn Christie APRN CNM  CNM Care: Any CNM care in labor  Attempted Delivery Types (Choose all that apply): Spontaneous Vaginal Delivery  Delivery Type (Choose the 1 that will go to the Birth History): Vaginal, Spontaneous                   Other personnel:  Provider Role   Savannah Mitchell, RN Delivery Nurse   Liv Pepe RN Registered Nurse                Placenta    Date/Time: 10/21/2021  4:58 AM  Removal: Spontaneous  Disposition: Hospital disposal           Anesthesia    Method: Epidural                Presentation and Position    Presentation: Vertex    Position: Left Occiput Anterior                 ARIAN Schulte CNM

## 2021-10-21 NOTE — PROGRESS NOTES
CNM PROGRESS NOTE    SUBJECTIVE:  Patient called reporting constant pressure. Feeling pelvic pain with contractions. Agreeable to SVE.     OBJECTIVE:  /66   Temp 98.6  F (37  C) (Oral)   Resp 16   LMP 2021 (Approximate)   SpO2 98%     Fetal heart tones: Baseline 115   Variability: moderate   Accelerations: present  Decelerations: absent    Contractions: Pt is licha every 2-3 minutes, lasting 60-80 seconds and palpates strong    Cervix: 9.5/ 90% / +2, Vtx  ROM: clear fluid    Pitocin- 6 mu/min.  Antibiotics- none  Cervical ripening: N/A    ASSESSMENT:  IUP @ 39w2d active labor and good progress   GBS- negative     PLAN:   Labor induction with Pitocin  reevaluate in 2-4 hours/PRN  Anticipate      Jasmyn Christie, ARIAN CNM

## 2021-10-21 NOTE — PLAN OF CARE
Data: Nicollette M Armijos transferred to [facility] via wheelchair at 0815. Baby transferred via parent's arms.  Action: Receiving unit notified of transfer: Yes. Patient and family notified of room change. Belongings sent to receiving unit. Accompanied by Registered Nurse. Oriented patient to surroundings. Call light within reach. ID bands double-checked with receiving RN.  Response: Patient tolerated transfer and is stable.    Patients mobililty level scored using the bedside mobility assistance tool (BMAT). Patient is at a mobility level test number: 4. Mobility equipment used: none required. Required assist of 0 staff members. Further use of BMAT scoring not required.

## 2021-10-21 NOTE — ANESTHESIA PREPROCEDURE EVALUATION
Anesthesia Pre-Procedure Evaluation    Patient: Nicollette M Armijos   MRN: [id number] : 2000        Preoperative Diagnosis: * No surgery found *    Procedure :           Past Medical History:   Diagnosis Date     Asthma      Constipation      Depressive disorder     depression and anxiety; pre-pregnancy on zoloft (nothing now)     Ovarian cyst, left      Urinary tract infection      Varicella     vaccinated      Past Surgical History:   Procedure Laterality Date     DILATION AND CURETTAGE SUCTION N/A 2014    Procedure: DILATION AND CURETTAGE SUCTION;  Surgeon: Petra Chase MD;  Location: [facility] OR     ESOPHAGOSCOPY, GASTROSCOPY, DUODENOSCOPY (EGD), COMBINED  2013    Procedure: COMBINED ESOPHAGOSCOPY, GASTROSCOPY, DUODENOSCOPY (EGD);  COLONOSCOPY & ESOPHAGOSCOPY, GASTROSCOPY, DUODENOSCOPY (EGD);  Surgeon: Esthela Regalado MD;  Location: [facility] OR     INSERT INTRAUTERINE DEVICE N/A 2014    Procedure: INSERT INTRAUTERINE DEVICE;  Surgeon: Petra Chase MD;  Location: [facility]     SIGMOIDOSCOPY FLEXIBLE  2013    Procedure: SIGMOIDOSCOPY FLEXIBLE;;  Surgeon: Esthela Regalado MD;  Location: [facility] OR     TONSILLECTOMY  2017      Allergies   Allergen Reactions     Penicillins Nausea and Vomiting     Milk-Related Compounds Unknown      Social History     Tobacco Use     Smoking status: Never Smoker     Smokeless tobacco: Never Used   Substance Use Topics     Alcohol use: No      Wt Readings from Last 1 Encounters:   10/18/21 66.7 kg (147 lb)        Anesthesia Evaluation            ROS/MED HX  ENT/Pulmonary:     (+) asthma     Neurologic:  - neg neurologic ROS     Cardiovascular:    (-) hypertension, CAD, CHF, arrhythmias, pulmonary hypertension and dyslipidemia   METS/Exercise Tolerance:     Hematologic:    (-) anemia   Musculoskeletal:    (-) arthritis   GI/Hepatic:     (+) GERD,     Renal/Genitourinary:  - neg Renal ROS     Endo:       Psychiatric/Substance Use:     (+) psychiatric  history depression     Infectious Disease:  - neg infectious disease ROS     Malignancy:  - neg malignancy ROS     Other:            Physical Exam    Airway        Mallampati: II   TM distance: > 3 FB   Neck ROM: full   Mouth opening: > 3 cm    Respiratory Devices and Support         Dental           Cardiovascular   cardiovascular exam normal          Pulmonary   pulmonary exam normal            Other findings: Lab Test        10/20/21     09/24/21     09/13/21     07/30/21     07/30/21                       1233          1751          1015          1017          1017          WBC           --          11.7*        9.0           --          10.0          HGB          10.6*        10.7*        11.4*          < >        11.5*         MCV           --          95           98            --          101*          PLT           --          199          212           --          218            < > = values in this interval not displayed.                  Lab Test        09/13/21     03/27/21     01/28/15                       2254          2109          1830          NA           139          140          136           POTASSIUM    3.6          3.4          3.5           CHLORIDE     110*         105          103           CO2          25           23           23            BUN          8            6*           7             CR           0.61         0.65         0.70          ANIONGAP     4            12           10            CHENG          8.2*         8.8          8.8*          GLC          98           81           104*            OUTSIDE LABS:  CBC:   Lab Results   Component Value Date    WBC 11.7 (H) 09/24/2021    WBC 9.0 09/13/2021    HGB 10.6 (L) 10/20/2021    HGB 10.7 (L) 09/24/2021    HCT 32.9 (L) 09/24/2021    HCT 35.1 09/13/2021     09/24/2021     09/13/2021     BMP:   Lab Results   Component Value Date     09/13/2021     03/27/2021    POTASSIUM 3.6 09/13/2021    POTASSIUM 3.4  03/27/2021    CHLORIDE 110 (H) 09/13/2021    CHLORIDE 105 03/27/2021    CO2 25 09/13/2021    CO2 23 03/27/2021    BUN 8 09/13/2021    BUN 6 (L) 03/27/2021    CR 0.61 09/13/2021    CR 0.65 03/27/2021    GLC 98 09/13/2021    GLC 81 03/27/2021     COAGS: No results found for: PTT, INR, FIBR  POC:   Lab Results   Component Value Date    HCG Negative 08/20/2015    HCGS Negative 01/28/2015     HEPATIC:   Lab Results   Component Value Date    ALBUMIN 3.6 03/27/2021    PROTTOTAL 6.8 03/27/2021    ALT 15 09/24/2021    AST 15 09/24/2021    ALKPHOS 112 09/13/2021    BILITOTAL 0.2 09/13/2021     OTHER:   Lab Results   Component Value Date    LACT 0.8 01/28/2015    A1C 4.8 05/17/2021    CHENG 8.2 (L) 09/13/2021    LIPASE 95 09/13/2021    AMYLASE 65 09/13/2021    TSH 0.76 09/24/2013    CRP 18.6 (H) 08/20/2015    SED 7 09/24/2013       Anesthesia Plan    ASA Status:  2      Anesthesia Type: Epidural.              Consents    Anesthesia Plan(s) and associated risks, benefits, and realistic alternatives discussed. Questions answered and patient/representative(s) expressed understanding.     - Discussed with:  Patient      - Extended Intubation/Ventilatory Support Discussed: No.      - Patient is DNR/DNI Status: No    Use of blood products discussed: No .     Postoperative Care       PONV prophylaxis: Ondansetron (or other 5HT-3)     Comments:                Brent Hill MD

## 2021-10-22 VITALS
SYSTOLIC BLOOD PRESSURE: 102 MMHG | HEART RATE: 76 BPM | OXYGEN SATURATION: 97 % | TEMPERATURE: 98 F | RESPIRATION RATE: 14 BRPM | DIASTOLIC BLOOD PRESSURE: 64 MMHG

## 2021-10-22 PROCEDURE — 250N000013 HC RX MED GY IP 250 OP 250 PS 637: Performed by: ADVANCED PRACTICE MIDWIFE

## 2021-10-22 RX ADMIN — DOCUSATE SODIUM 100 MG: 100 CAPSULE, LIQUID FILLED ORAL at 08:52

## 2021-10-22 RX ADMIN — IBUPROFEN 800 MG: 800 TABLET, FILM COATED ORAL at 03:29

## 2021-10-22 RX ADMIN — ACETAMINOPHEN 650 MG: 325 TABLET, FILM COATED ORAL at 08:52

## 2021-10-22 RX ADMIN — IBUPROFEN 800 MG: 800 TABLET, FILM COATED ORAL at 10:10

## 2021-10-22 RX ADMIN — ACETAMINOPHEN 650 MG: 325 TABLET, FILM COATED ORAL at 03:29

## 2021-10-22 ASSESSMENT — ACTIVITIES OF DAILY LIVING (ADL)
ADLS_ACUITY_SCORE: 3

## 2021-10-22 NOTE — PROGRESS NOTES
Kailash Rodriguez CNM Progress Note: Postpartum Day #1    2021  10:04 AM    SUBJECTIVE:  Patient is stable and is tolerating acitivity well  Baby is rooming in. Faustino at bedside providing continuous support with mom/baby cares.  Complications since 2 hours post delivery: None  Pain is well controlled.  Patient is taking pain medications prn.  Breastfeeding status: formula fed   Elimination:  She is voiding without difficulty.  She has not had a bowel movement.  Desired contraception: Natural family planning/condoms  Denies heavy bleeding and passing large clots.  Feels happy about birth experience, pleased with an uncomplicated induction.    OBJECTIVE:  /64   Pulse 76   Temp 98  F (36.7  C) (Oral)   Resp 14   LMP 2021 (Approximate)   SpO2 97%   Breastfeeding Unknown     Constitutional: healthy, alert and no distress  Breasts: Soft, wearing supportive tank  Fundus: Uterine fundus is firm, non-tender and at -2 below the level of the umbilicus.  Perineum: Perineum is intact and/or well approximated, with minimal swelling  Lochia: Lochia is appropriate for the duration of time since delivery.     ASSESSMENT:  PPD #1    Doing well.  Hemoglobin   Date Value Ref Range Status   10/20/2021 10.6 (L) 11.7 - 15.7 g/dL Final   2021 12.9 11.7 - 15.7 g/dL Final   ]      PLAN:  Continue routine care  Discussed ways to manage engorgement prn/preventing breast stimulation/lactation triggers, Sudafed prn  Reviewed postpartum warning signs  Reviewed postpartum blues and postpartum depression warning signs  Plans NFP/condoms for contraception postpartum. Will reevaluate at 6 weeks.  Anticipated discharge this afternoon.        ARIAN Conte CNM

## 2021-10-22 NOTE — PLAN OF CARE
Patient stable and ready for discharge. AVS reviewed and all questions answered. She is aware to make 2 and 6 week appointments for follow up. No discharge medications. No breast pump due to formula feeding.  present and supportive. All belongings sent with family at time of discharge. Ambulated to the exit with staff escort at 1610.

## 2021-10-22 NOTE — DISCHARGE SUMMARY
[facility]    Discharge Summary  Obstetrics    Date of Admission:  10/20/2021  Date of Discharge:  10/22/2021  Discharging Provider: Susana Pretty  Date of Service (when I saw the patient): 10/22/21    Discharge Diagnoses       History of Present Illness   Nicollette M Armijos is a 21 year old female who presented for elective IOL at 39 weeks. Sullivan 8 on admission and labor induced with Pitocin and progressed as expected to uncomplicated .    Hospital Course   The patient's hospital course was unremarkable.  She recovered as anticipated and experienced no post-delivery complications. On discharge, her pain was well controlled. Vaginal bleeding is stable.  Voiding without difficulty.  Ambulating well and tolerating a normal diet.  No fever.  She is formula feeding per her preference.  Infant is stable and has passed all  testing.  She was discharged on post-partum day #1.    Post-partum hemoglobin:   Hemoglobin   Date Value Ref Range Status   10/20/2021 10.6 (L) 11.7 - 15.7 g/dL Final   2021 12.9 11.7 - 15.7 g/dL Final       Susana Pretty, APRN CNM    Discharge Disposition   Discharged to home   Condition at discharge: Stable    Primary Care Physician   Kathrin Pedraza    Consultations This Hospital Stay   ANESTHESIOLOGY IP CONSULT  HOME CARE POST PARTUM/ IP CONSULT  LACTATION IP CONSULT    Discharge Orders      Activity    Activity as tolerated     Reason for your hospital stay    Maternity care     Follow Up    Follow up with provider in 2 weeks and 6 weeks for post-delivery checks     Breast pump - Manual/Electric    Breast Pump Documentation:  Manual/Electric Pump: To support adequate breast milk production and nutrition for infant.     I, the undersigned, certify that the above prescribed supplies are medically necessary for this patient and is both reasonable and necessary in reference to accepted standards of medical and necessary in reference to  accepted standards of medical practice in the treatment of this patient's condition and is not prescribed as a convenience.     Diet    Resume previous diet     Discharge Medications   Current Discharge Medication List      CONTINUE these medications which have NOT CHANGED    Details   docusate sodium (COLACE) 100 MG capsule Take 100 mg by mouth daily      Prenatal Vit-Fe Fumarate-FA (PRENATAL VITAMIN) 27-0.8 MG TABS            Allergies   Allergies   Allergen Reactions     Penicillins Nausea and Vomiting     Milk-Related Compounds Unknown

## 2021-10-22 NOTE — ANESTHESIA POSTPROCEDURE EVALUATION
Patient: Nicollette M Armijos    Procedure: * No procedures listed *       Diagnosis:* No pre-op diagnosis entered *  Diagnosis Additional Information: No value filed.    Anesthesia Type:  Epidural    Note:  Disposition: Inpatient   Postop Pain Control:    PONV:    Neuro/Psych:    Airway/Respiratory:    CV/Hemodynamics:    Other NRE:    DID A NON-ROUTINE EVENT OCCUR?     Event details/Postop Comments:  Labor epidural analgesia satisfactory.  L&D RN removed epidural catheterNo residual sensorimotor blockade.  Patient denies headache, fever or chills.  She will notify postpartum RN of any problems or questions.           Last vitals:  Vitals Value Taken Time   BP     Temp     Pulse     Resp     SpO2         Electronically Signed By: Janes Chong MD  October 22, 2021  10:32 AM

## 2021-10-22 NOTE — PLAN OF CARE
VSS.  Pain well managed with ibuprofen and Tylenol. Abdominal binder and medicated spray given this shift for comfort.  Ambulating and voiding without difficulty. Formula feeding infant every 2-3 hours. Independent with infant cares. SO at bedside and supportive and attentive to patient and infant needs. Positive bonding behaviors observed.

## 2021-10-22 NOTE — DISCHARGE INSTRUCTIONS
Postpartum Support/Helpline: www.ppsupportmn.org    [facility]: [phone]    Postpartum Vaginal Delivery Instructions    Activity       Ask family and friends for help when you need it.    Do not place anything in your vagina for 6 weeks.    You are not restricted on other activities, but take it easy for a few weeks to allow your body to recover from delivery.  You are able to do any activities you feel up to that point.    No driving until you have stopped taking your pain medications (usually two weeks after delivery).     Call your health care provider if you have any of these symptoms:       Increased pain, swelling, redness, or fluid around your stiches from an episiotomy or perineal tear.    A fever above 100.4 F (38 C) with or without chills when placing a thermometer under your tongue.    You soak a sanitary pad with blood within 1 hour, or you see blood clots larger than a golf ball.    Bleeding that lasts more than 6 weeks.    Vaginal discharge that smells bad.    Severe pain, cramping or tenderness in your lower belly area.    A need to urinate more frequently (use the toilet more often), more urgently (use the toilet very quickly), or it burns when you urinate.    Nausea and vomiting.    Redness, swelling or pain around a vein in your leg.    Problems breastfeeding or a red or painful area on your breast.    Chest pain and cough or are gasping for air.    Problems coping with sadness, anxiety, or depression.  If you have any concerns about hurting yourself or the baby, call your provider immediately.     You have questions or concerns after you return home.     Keep your hands clean:  Always wash your hands before touching your perineal area and stitches.  This helps reduce your risk of infection.  If your hands aren't dirty, you may use an alcohol hand-rub to clean your hands. Keep your nails clean and short.

## 2021-10-27 ENCOUNTER — TELEPHONE (OUTPATIENT)
Dept: OBGYN | Facility: CLINIC | Age: 21
End: 2021-10-27

## 2021-10-27 NOTE — TELEPHONE ENCOUNTER
"Pt calling with \"excruciating back pain\" that she noticed the day she was discharged.    Taking Tyelonol 100mg every 6 hr and 800mg ibu every 6.  Tried heat, little relief.  Hard to fall asleep, but able to stay asleep once she does.  Is doing epsom salt baths, having her spouse massage/put pressure (which helps while he is doing it.)    States it is all over, but worse just above the mid level on the left side.   on 10/20, pushed for about 2 1/2 hours.  Reviewed that after deliveries muscle soreness can be common from pushing positions.    Reviewed gentle stretches to try to work out the area should be ok.    Please advise what else pt should do, do you want to see her in clinic?    Tatyana Diaz RN    "

## 2021-10-28 NOTE — TELEPHONE ENCOUNTER
Would recommend patient be seen for evaluation if no improvement in symptoms      ARIAN Yang, CNM

## 2021-11-03 ENCOUNTER — PRENATAL OFFICE VISIT (OUTPATIENT)
Dept: OBGYN | Facility: CLINIC | Age: 21
End: 2021-11-03
Payer: COMMERCIAL

## 2021-11-03 VITALS
BODY MASS INDEX: 20.6 KG/M2 | SYSTOLIC BLOOD PRESSURE: 102 MMHG | WEIGHT: 128.2 LBS | HEIGHT: 66 IN | DIASTOLIC BLOOD PRESSURE: 78 MMHG

## 2021-11-03 DIAGNOSIS — M54.6 ACUTE MIDLINE THORACIC BACK PAIN: ICD-10-CM

## 2021-11-03 PROCEDURE — 99212 OFFICE O/P EST SF 10 MIN: CPT | Mod: 24 | Performed by: ADVANCED PRACTICE MIDWIFE

## 2021-11-03 RX ORDER — CYCLOBENZAPRINE HCL 5 MG
5 TABLET ORAL 3 TIMES DAILY PRN
Qty: 20 TABLET | Refills: 0 | Status: SHIPPED | OUTPATIENT
Start: 2021-11-03 | End: 2022-04-13

## 2021-11-03 ASSESSMENT — MIFFLIN-ST. JEOR: SCORE: 1353.26

## 2021-11-03 NOTE — PROGRESS NOTES
Midwife Postpartum 2 Week Visit    Nicollette M Armijos is a 21 year old here for a 2 week postpartum checkup.     Delivery date was 10/21/2021. She had a  of a viable boy, weight 7 pounds 6 oz., with no complications. Coleman.     Since delivery, she has not been breast feeding.  She has not had any signs of infection. Her lochia is now light. She can feel pressure on her stiches with bowel movements at times but is taking colase.   She has not had other complications.      She is voiding and having bowel movements without difficulty.       Contraception was discussed and patient desires natural family planning.   She  has not had intercourse since delivery.   She complains of mild  perineal discomfort. Helped by bath and stool softener. She has concerns today abut her back pain. Upper/mid back not at epidural site. Makes it hard to lay flat. She has gone to chiropractic x 3 but finds relieve is short after that visit.     Mood is Stable  Patient screened for postpartum depression.   Depression Rating was: 6 with 0 for number 10.       ROS:   ROS: 10 point ROS neg other than the symptoms noted above in the HPI.    Current Outpatient Medications:      docusate sodium (COLACE) 100 MG capsule, Take 100 mg by mouth daily, Disp: , Rfl:      Prenatal Vit-Fe Fumarate-FA (PRENATAL VITAMIN) 27-0.8 MG TABS, , Disp: , Rfl: .   OB History    Para Term  AB Living   3 1 1 0 2 1   SAB TAB Ectopic Multiple Live Births   1 0 0 0 1      # Outcome Date GA Lbr Isidro/2nd Weight Sex Delivery Anes PTL Lv   3 Term 10/21/21 39w2d 03:03 / 02:18 3.345 kg (7 lb 6 oz) M Vag-Spont EPI N ROBERT      Name: GAETANO ANDRADE-NICOLLETTE      Apgar1: 9  Apgar5: 9   2 SAB  4w0d          1 AB  6w0d    AB, COMPLETE        Last pap:  No results found for: PAP  Hgb in hospital was 10.6    EXAM:  LMP 2021 (Approximate)   BMI: Body mass index is 20.69 kg/m .  Exam:  Constitutional: healthy, alert and no distress  Respiratory: negative,  Percussion normal. Good diaphragmatic excursion.   Psychiatric: mentation appears normal and affect normal/bright    ASSESSMENT:   Normal postpartum exam after .    ICD-10-CM    1. Postpartum exam  Z39.2    2. Acute midline thoracic back pain  M54.6 cyclobenzaprine (FLEXERIL) 5 MG tablet     XR Lumbar Spine 2/3 Views     Physical Therapy Referral     PLAN:  No results found for any visits on 21.  Return for 6 week postpartum visit.  Teaching: exercise and mental health  Family Planning:natural family planning  Encourage Kegels and abdominal exercise.  Continue a multivitamin/prenatal supplement, especially if breastfeeding.  Postpartum Hgb was not done today.  Back Pain: Rx for muscle relaxer for short term us.   Referral for PT  X ray to assess for fracture or compression.   Portia Bhardwaj CNM

## 2021-11-03 NOTE — NURSING NOTE
"Chief Complaint   Patient presents with     Postpartum Care     2 week postpartum       Initial /78 (BP Location: Right arm, Cuff Size: Adult Regular)   Ht 1.676 m (5' 6\")   Wt 58.2 kg (128 lb 3.2 oz)   LMP 2021 (Approximate)   Breastfeeding No   BMI 20.69 kg/m   Estimated body mass index is 20.69 kg/m  as calculated from the following:    Height as of this encounter: 1.676 m (5' 6\").    Weight as of this encounter: 58.2 kg (128 lb 3.2 oz).  BP completed using cuff size: regular    Questioned patient about current smoking habits.  Pt. has never smoked.          "

## 2021-11-03 NOTE — LETTER
[facility] [place] [address]  [address]  [facility] [address]  Phone: [phone]  Fax: [phone]    November 3, 2021        Nicollette M Armijos  [address] [facility] [address]      To whom it may concern:    RE: Nicollette M Armijos Nicollette has back pain after delivery. I prescribe message as needed for this condition.   Please contact me for questions or concerns.    Sincerely,  Portia Bhardwaj CNM

## 2021-12-06 ENCOUNTER — PRENATAL OFFICE VISIT (OUTPATIENT)
Dept: OBGYN | Facility: CLINIC | Age: 21
End: 2021-12-06
Payer: COMMERCIAL

## 2021-12-06 VITALS — DIASTOLIC BLOOD PRESSURE: 78 MMHG | BODY MASS INDEX: 21.14 KG/M2 | SYSTOLIC BLOOD PRESSURE: 104 MMHG | WEIGHT: 131 LBS

## 2021-12-06 PROCEDURE — 99207 PR POST PARTUM EXAM: CPT | Performed by: OBSTETRICS & GYNECOLOGY

## 2021-12-06 NOTE — NURSING NOTE
"Chief Complaint   Patient presents with     Postpartum Care     10/21/2021    7# 6 oz  boy        Initial /78 (BP Location: Right arm, Cuff Size: Adult Regular)   Wt 59.4 kg (131 lb)   LMP 2021 (Exact Date)   Breastfeeding No   BMI 21.14 kg/m   Estimated body mass index is 21.14 kg/m  as calculated from the following:    Height as of 11/3/21: 1.676 m (5' 6\").    Weight as of this encounter: 59.4 kg (131 lb).  BP completed using cuff size: regular    Questioned patient about current smoking habits.  Pt. has never smoked.          The following HM Due: NONE      Laurel Montoya, CMA on 2021 at 8:12 AM           "

## 2021-12-06 NOTE — PROGRESS NOTES
SUBJECTIVE:  Nicollette M Armijos,  is here for a postpartum visit.  She had a  on 10/21/2021.      Last PHQ-9 score on record=   PHQ-9 SCORE 2021   PHQ-9 Total Score 6     No flowsheet data found.    Delivery complications:  No  Breast feeding:  No  Bladder problems:  No  Bowel problems/hemorrhoids:  No  Episiotomy/laceration/incision healed? Yes: Feels fine.  Vaginal flow:  Yes got first menses since delivery on 12/3/2021 - currently on and wants to defer exam  [place]:  No  Contraception: condoms  Emotional adjustment:  doing well and happy  Back to work:  Soon      OBJECTIVE:  Vitals: /78 (BP Location: Right arm, Cuff Size: Adult Regular)   Wt 59.4 kg (131 lb)   LMP 2021 (Exact Date)   Breastfeeding No   BMI 21.14 kg/m    BMI= Body mass index is 21.14 kg/m .  General - pleasant female in no acute distress.  Pelvix exam deferred at Pt's request due to current menses.    ASSESSMENT:    ICD-10-CM    1. Postpartum care following vaginal delivery  Z39.2        PLAN:  May resume normal activities without restrictions.  Last Pap date:  2020  Result: WNL   Full counseling was provided, and all questions were answered.   Return to clinic in one year for an annual visit.  Not due for Pap until .    Phil Land MD    There are no Patient Instructions on file for this visit.

## 2021-12-07 ASSESSMENT — PATIENT HEALTH QUESTIONNAIRE - PHQ9: SUM OF ALL RESPONSES TO PHQ QUESTIONS 1-9: 6

## 2022-04-07 ENCOUNTER — TELEPHONE (OUTPATIENT)
Dept: MIDWIFE SERVICES | Facility: CLINIC | Age: 22
End: 2022-04-07
Payer: COMMERCIAL

## 2022-04-07 DIAGNOSIS — O21.9 NAUSEA AND VOMITING DURING PREGNANCY: Primary | ICD-10-CM

## 2022-04-07 DIAGNOSIS — K59.00 CONSTIPATION: Primary | ICD-10-CM

## 2022-04-07 RX ORDER — METOCLOPRAMIDE 5 MG/1
5-10 TABLET ORAL 3 TIMES DAILY PRN
Qty: 60 TABLET | Refills: 0 | Status: SHIPPED | OUTPATIENT
Start: 2022-04-07 | End: 2022-06-24

## 2022-04-07 RX ORDER — LACTULOSE 10 G/10G
SOLUTION ORAL
Qty: 30 PACKET | Refills: 1 | Status: SHIPPED | OUTPATIENT
Start: 2022-04-07 | End: 2022-04-27

## 2022-04-07 NOTE — TELEPHONE ENCOUNTER
Pt notified via mychart        Ok with lactulose, Rx for reglan sent.     Thanks!     Sara Hernadez, APRN NATALYM

## 2022-04-07 NOTE — TELEPHONE ENCOUNTER
Pt in early pregnancy.      She has N&V, vomiting multiple times a days and other days just nauseous.    She had to use reglan last pregnancy, she wants to get an rx for this.    She also has issues with constipation and asks for lactulose packets like last pregnancy as well.      Susanne MAYER RN BSN

## 2022-04-13 ENCOUNTER — TELEPHONE (OUTPATIENT)
Dept: MIDWIFE SERVICES | Facility: CLINIC | Age: 22
End: 2022-04-13

## 2022-04-13 ENCOUNTER — PRENATAL OFFICE VISIT (OUTPATIENT)
Dept: NURSING | Facility: CLINIC | Age: 22
End: 2022-04-13
Payer: COMMERCIAL

## 2022-04-13 DIAGNOSIS — Z34.80 PRENATAL CARE, SUBSEQUENT PREGNANCY, UNSPECIFIED TRIMESTER: Primary | ICD-10-CM

## 2022-04-13 DIAGNOSIS — O26.859 SPOTTING IN EARLY PREGNANCY: Primary | ICD-10-CM

## 2022-04-13 PROCEDURE — 99207 PR NO CHARGE NURSE ONLY: CPT

## 2022-04-13 NOTE — TELEPHONE ENCOUNTER
Pt calling 7w by LMP.  Has US on 4/27.  Has had spotting for a couple of days pink scant amounts on TP when she wiped, now today was brown and enough to get onto her underwear.  Is having some pain consisitently on her left to mid abdomen.  She believed this to be from constipation.  Has had constipation in the past, had BM yesterday which slightly helped with the pain.    Scheduled for US tomorrow.  Gave bleeding/pain precautions.  Blood type O+    Tatyana Diaz, RN

## 2022-04-14 ENCOUNTER — ANCILLARY PROCEDURE (OUTPATIENT)
Dept: ULTRASOUND IMAGING | Facility: CLINIC | Age: 22
End: 2022-04-14
Payer: COMMERCIAL

## 2022-04-14 DIAGNOSIS — O26.859 SPOTTING IN EARLY PREGNANCY: ICD-10-CM

## 2022-04-14 PROCEDURE — 76817 TRANSVAGINAL US OBSTETRIC: CPT | Performed by: OBSTETRICS & GYNECOLOGY

## 2022-04-14 PROCEDURE — 76801 OB US < 14 WKS SINGLE FETUS: CPT | Performed by: OBSTETRICS & GYNECOLOGY

## 2022-04-19 ENCOUNTER — ANCILLARY PROCEDURE (OUTPATIENT)
Dept: ULTRASOUND IMAGING | Facility: CLINIC | Age: 22
End: 2022-04-19
Attending: ADVANCED PRACTICE MIDWIFE
Payer: COMMERCIAL

## 2022-04-19 ENCOUNTER — NURSE TRIAGE (OUTPATIENT)
Dept: NURSING | Facility: CLINIC | Age: 22
End: 2022-04-19
Payer: COMMERCIAL

## 2022-04-19 ENCOUNTER — PRENATAL OFFICE VISIT (OUTPATIENT)
Dept: MIDWIFE SERVICES | Facility: CLINIC | Age: 22
End: 2022-04-19
Payer: COMMERCIAL

## 2022-04-19 VITALS — SYSTOLIC BLOOD PRESSURE: 100 MMHG | WEIGHT: 134.8 LBS | DIASTOLIC BLOOD PRESSURE: 74 MMHG | BODY MASS INDEX: 21.76 KG/M2

## 2022-04-19 DIAGNOSIS — O20.9 BLEEDING IN EARLY PREGNANCY: Primary | ICD-10-CM

## 2022-04-19 DIAGNOSIS — O20.9 BLEEDING IN EARLY PREGNANCY: ICD-10-CM

## 2022-04-19 PROCEDURE — 76801 OB US < 14 WKS SINGLE FETUS: CPT | Performed by: OBSTETRICS & GYNECOLOGY

## 2022-04-19 PROCEDURE — 99213 OFFICE O/P EST LOW 20 MIN: CPT | Performed by: ADVANCED PRACTICE MIDWIFE

## 2022-04-19 PROCEDURE — 84702 CHORIONIC GONADOTROPIN TEST: CPT | Performed by: ADVANCED PRACTICE MIDWIFE

## 2022-04-19 PROCEDURE — 36415 COLL VENOUS BLD VENIPUNCTURE: CPT | Performed by: ADVANCED PRACTICE MIDWIFE

## 2022-04-19 NOTE — PROGRESS NOTES
SUBJECTIVE:                                                   Nicollette M Armijos is a 21 year old who presents to clinic today for the following health issue(s):  Patient presents with:  Consult: Vaginal bleeding in early pregnancy. States that she has had spotting for about 2 weeks.      HPI:  Lorrie presents today for vaginal bleeding x 2 weeks. Bleeding has been light, but is worried due to history of multiple losses. Slight cramping. Had an ultrasound last week that was normal and confirmed LMP dating.    Patient's last menstrual period was 2022 (within days).  Menstrual History: frequency: every 28-30 days  Patient is sexually active  .  Using nothing for contraception.   Health maintenance updated:  yes  STI infx testing offered:  Declined    Last PHQ-9 score on record =   PHQ-9 SCORE 2021   PHQ-9 Total Score 6     Last GAD7 score on record = No flowsheet data found.      Problem list and histories reviewed & adjusted, as indicated.  Additional history: as documented.    Patient Active Problem List   Diagnosis     Abdominal pain, generalized     Asthma, exercise induced     BRIANNA (generalized anxiety disorder)     Major depressive disorder, single episode, moderate (H)     Encounter for supervision of other normal pregnancy, third trimester     Anxiety during pregnancy     Nausea/vomiting in pregnancy     Indication for care in labor or delivery     Uterine size-date discrepancy in third trimester     Encounter for triage in pregnant patient     Back pain affecting pregnancy in third trimester     Bacterial vaginosis in pregnancy     Labor and delivery indication for care or intervention      (normal spontaneous vaginal delivery)     Past Surgical History:   Procedure Laterality Date     DILATION AND CURETTAGE SUCTION N/A 2014    Procedure: DILATION AND CURETTAGE SUCTION;  Surgeon: Petra Chase MD;  Location:  OR     ESOPHAGOSCOPY, GASTROSCOPY, DUODENOSCOPY (EGD), COMBINED   11/14/2013    Procedure: COMBINED ESOPHAGOSCOPY, GASTROSCOPY, DUODENOSCOPY (EGD);  COLONOSCOPY & ESOPHAGOSCOPY, GASTROSCOPY, DUODENOSCOPY (EGD);  Surgeon: Esthela Regalado MD;  Location: [facility] OR     INSERT INTRAUTERINE DEVICE N/A 09/25/2014    Procedure: INSERT INTRAUTERINE DEVICE;  Surgeon: Petra Chase MD;  Location: [facility] OR     SIGMOIDOSCOPY FLEXIBLE  11/14/2013    Procedure: SIGMOIDOSCOPY FLEXIBLE;;  Surgeon: Esthela Regalado MD;  Location: [facility] OR     TONSILLECTOMY  2017      Social History     Tobacco Use     Smoking status: Never Smoker     Smokeless tobacco: Never Used   Substance Use Topics     Alcohol use: No      Problem (# of Occurrences) Relation (Name,Age of Onset)    Colon Cancer (1) Maternal Grandmother    Depression (1) Father    Diabetes (2) Paternal Grandfather, Brother    Heart Disease (2) Father, Paternal Grandfather    No Known Problems (3) Mother, Brother, Paternal Grandmother    Ovarian Cancer (1) Maternal Grandmother            Current Outpatient Medications   Medication Sig     docusate sodium (COLACE) 100 MG capsule Take 100 mg by mouth daily     lactulose (CEPHULAC) 10 GM packet Take 1-2 packets daily prn for constipation     metoclopramide (REGLAN) 5 MG tablet Take 1-2 tablets (5-10 mg) by mouth 3 times daily as needed (nausea in pregnancy)     Prenatal Vit-Fe Fumarate-FA (PRENATAL VITAMIN) 27-0.8 MG TABS      No current facility-administered medications for this visit.     Allergies   Allergen Reactions     Penicillins Nausea and Vomiting       ROS:  CONSTITUTIONAL: NEGATIVE for fever, chills, change in weight  RESP: NEGATIVE for significant cough or SOB  GI: NEGATIVE for nausea, abdominal pain, heartburn, or change in bowel habits  : NEGATIVE for unusual urinary or vaginal symptoms. Periods were regular.  NEURO: NEGATIVE for weakness, dizziness or paresthesias  HEME/ALLERGY/IMMUNE: NEGATIVE for bleeding problems  PSYCHIATRIC: NEGATIVE for changes in mood or  affect    OBJECTIVE:     /74 (BP Location: Left arm, Cuff Size: Adult Regular)   Wt 61.1 kg (134 lb 12.8 oz)   LMP 02/23/2022 (Within Days)   BMI 21.76 kg/m    Body mass index is 21.76 kg/m .    PHYSICAL EXAM:  Constitutional:  Appearance: Well nourished, well developed alert, in no acute distress  Chest:  Respiratory Effort:  Breathing unlabored.   Neurologic:  Mental Status:  Oriented X3.  Normal strength and tone, sensory exam grossly normal, mentation intact and speech normal.    Psychiatric:  Mentation appears normal and affect normal/bright.     In-Clinic Test Results:  No results found for this or any previous visit (from the past 24 hour(s)).    ASSESSMENT/PLAN:                                                        ICD-10-CM    1. Bleeding in early pregnancy  O20.9 US OB < 14 Weeks Single     HCG quantitative pregnancy     HCG quantitative pregnancy       PLAN:    Discussed possible etiologies for bleeding in early pregnancy. Normal ultrasound last week reassuring. Will get interval ultrasound and serial quantitative HCGs. Blood type is O+ from previous pregnancy. Encouraged patient to call with any questions or concerns.      ARIAN Yang, CNM

## 2022-04-19 NOTE — NURSING NOTE
"Chief Complaint   Patient presents with     Consult     Vaginal bleeding in early pregnancy. States that she has had spotting for about 2 weeks.       Initial /74 (BP Location: Left arm, Cuff Size: Adult Regular)   Wt 61.1 kg (134 lb 12.8 oz)   LMP 2022 (Within Days)   BMI 21.76 kg/m   Estimated body mass index is 21.76 kg/m  as calculated from the following:    Height as of 11/3/21: 1.676 m (5' 6\").    Weight as of this encounter: 61.1 kg (134 lb 12.8 oz).  BP completed using cuff size: regular    Questioned patient about current smoking habits.  Pt. has never smoked.               "

## 2022-04-19 NOTE — TELEPHONE ENCOUNTER
OB Triage Call    Is patient's OB/Midwife with the [facility] or [facility]? [facility]- Proceed with triage     Reason for call: Spotting    Assessment: Pt calling due to spotting for 2 weeks now  She had an ultrasound on 2022  Hx of 2 miscarriages   If patient didn't wear a pad it would soak her underwear; not soaking a pad    Constipation   Cramping on the left side below belly button  Sharp pain periodically on left side    Plan: SEE IN OFFICE TODAY:   * You need to be examined today. Let me give you an appointment.  * IF NO AVAILABLE APPOINTMENTS:  You need to be seen in the Urgent Care Center.     Patient plans to deliver at [facility]     Patient's primary OB Provider is Midwife, Midwives.      Per protocol recommendations Patient to schedule follow up appointment within OB/MIDWIVES.        7w6d    Estimated Date of Delivery: 2022        OB History    Para Term  AB Living   4 1 1 0 2 1   SAB IAB Ectopic Multiple Live Births   2 0 0 0 1      # Outcome Date GA Lbr Isidro/2nd Weight Sex Delivery Anes PTL Lv   4 Current            3 Term 10/21/21 39w2d 03:03 / 02:18 3.345 kg (7 lb 6 oz) M Vag-Spont EPI N ROBERT      Name: ARMIJOS,MALE-NICOLLETTE      Apgar1: 9  Apgar5: 9   2 2014 4w0d          1 2014 6w0d    AB, COMPLETE          No results found for: GBS       Reason for Disposition    Intermittent lower abdominal pain (e.g., cramping) lasting > 24 hours    Additional Information    Negative: Shock suspected (e.g., cold/pale/clammy skin, too weak to stand, low BP, rapid pulse)    Negative: Difficult to awaken or acting confused (e.g., disoriented, slurred speech)    Negative: Passed out (i.e., fainted, collapsed and was not responding)    Negative: Sounds like a life-threatening emergency to the triager    Negative: Vaginal bleeding and pregnant > 20 weeks    Negative: Not pregnant or pregnancy status unknown    Negative: SEVERE abdominal pain (e.g., excruciating)    Negative: SEVERE  vaginal bleeding (e.g., soaking 2 pads / hour, large blood clots) and present for 2 or more hours    Negative: SEVERE dizziness (e.g., unable to stand, requires support to walk, feels like passing out)    Negative: MODERATE vaginal bleeding (e.g., soaking 1 pad) and present > 6 hours    Negative: MODERATE vaginal bleeding (e.g., soaking 1 pad / hour, clots) and pregnant > 12 weeks    Negative: Passed tissue (e.g., gray-white)    Negative: Shoulder pain    Negative: Constant abdominal pain lasting > 1 hour    Negative: Fever > 100.4 F (38.0 C)    Negative: Patient sounds very sick or weak to the triager    Negative: Pale skin (pallor) of new onset or worsening    Negative: MODERATE vaginal bleeding (e.g., soaking 1 pad / hour; clots)    Negative: Pain or burning with passing urine (urination)    Negative: Prior history of 'ectopic pregnancy' or previous tubal surgery (e.g., tubal ligation)    Protocols used: PREGNANCY - VAGINAL BLEEDING LESS THAN 20 WEEKS EGA-A-OH    Tatyana Castaneda RN  [facility] Nurse Advisor 7:51 AM 4/19/2022

## 2022-04-20 LAB — B-HCG SERPL-ACNC: ABNORMAL IU/L (ref 0–5)

## 2022-04-21 ENCOUNTER — LAB (OUTPATIENT)
Dept: LAB | Facility: CLINIC | Age: 22
End: 2022-04-21
Payer: COMMERCIAL

## 2022-04-21 DIAGNOSIS — O20.9 BLEEDING IN EARLY PREGNANCY: ICD-10-CM

## 2022-04-21 PROCEDURE — 84702 CHORIONIC GONADOTROPIN TEST: CPT

## 2022-04-21 PROCEDURE — 36415 COLL VENOUS BLD VENIPUNCTURE: CPT

## 2022-04-22 ENCOUNTER — TELEPHONE (OUTPATIENT)
Dept: OBGYN | Facility: CLINIC | Age: 22
End: 2022-04-22
Payer: COMMERCIAL

## 2022-04-22 LAB — B-HCG SERPL-ACNC: ABNORMAL IU/L (ref 0–5)

## 2022-04-22 NOTE — TELEPHONE ENCOUNTER
Called patient and advised of reassuring ultrasound.     INDICATIONS FOR ULTRASOUND:  OB History: 1st trimester loss (miscarriage)  Present Conditions: Bleeding in pregnancy 1st trimester     CLINICAL INFORMATION     LMP: 23 Feb 2022 - sure EDC: 30 Nov 2022   EGA: 7 w 6 d                                                                                                ===================     MEASUREMENTS  Gest Sac: vis        Position:nl    Contour:smooth/regular.   Yolk sac: 2.1 mm wnl  CRL: 1.68 cm.      EGA:  8w 1d    U/S EDC: 28 Nov 2022 correspond  Cardiac Activity:Yes     FHR: 157 bpm reg     Rt Ov: 4.8 x 3.4 x 3.0 cm, Simple cyst 2.8 x 2.3 x 2.4cm  Lt Ov: 2.4 x 1.6 x 1.8 cm    Wnl  Cul de sac: no free fluid     ======================================  Impression:   Early obstetric transabdominal ultrasound. Living intrauterine pregnancy is seen.      Corresponding sonographic and menstrual EGA and EDC. Based on this, best estimated Date of Delivery: Nov 30, 2022     Normal amniotic fluid seen.     Placenta: not seen, appropriate for this gestational age  Gestational sac: seen and within normal limits      Simple ovarian cyst seen.      Normal 1st trimester ultrasound with viable intrauterine pregnancy.   No apparent source of 1st trimester bleeding identified.         Premenopausal asymptomatic simple cyst:     <= 3 cm: Normal, no follow-up.        Also discussed slightly falling HCG     4/19    103,077  4/21       92,266    Unclear of significance due to reassuring ultrasound. Another ultrasound scheduled for 4/27. Patient reports that bleeding has significantly slowed. Encouraged patient to call with any questions or concerns.      Kathy Angulo, APRN, CNM

## 2022-04-27 ENCOUNTER — PRENATAL OFFICE VISIT (OUTPATIENT)
Dept: MIDWIFE SERVICES | Facility: CLINIC | Age: 22
End: 2022-04-27
Payer: COMMERCIAL

## 2022-04-27 ENCOUNTER — ANCILLARY PROCEDURE (OUTPATIENT)
Dept: ULTRASOUND IMAGING | Facility: CLINIC | Age: 22
End: 2022-04-27
Payer: COMMERCIAL

## 2022-04-27 ENCOUNTER — TELEPHONE (OUTPATIENT)
Dept: MIDWIFE SERVICES | Facility: CLINIC | Age: 22
End: 2022-04-27

## 2022-04-27 VITALS — SYSTOLIC BLOOD PRESSURE: 98 MMHG | DIASTOLIC BLOOD PRESSURE: 56 MMHG | WEIGHT: 134.2 LBS | BODY MASS INDEX: 21.66 KG/M2

## 2022-04-27 DIAGNOSIS — K59.00 CONSTIPATION IN PREGNANCY IN FIRST TRIMESTER: Primary | ICD-10-CM

## 2022-04-27 DIAGNOSIS — K59.00 CONSTIPATION IN PREGNANCY: Primary | ICD-10-CM

## 2022-04-27 DIAGNOSIS — O99.619 CONSTIPATION IN PREGNANCY: Primary | ICD-10-CM

## 2022-04-27 DIAGNOSIS — Z34.80 PRENATAL CARE, SUBSEQUENT PREGNANCY, UNSPECIFIED TRIMESTER: ICD-10-CM

## 2022-04-27 DIAGNOSIS — O99.611 CONSTIPATION IN PREGNANCY IN FIRST TRIMESTER: Primary | ICD-10-CM

## 2022-04-27 DIAGNOSIS — Z34.91 PRENATAL CARE IN FIRST TRIMESTER: ICD-10-CM

## 2022-04-27 LAB
ABO/RH(D): NORMAL
ANTIBODY SCREEN: NEGATIVE
ERYTHROCYTE [DISTWIDTH] IN BLOOD BY AUTOMATED COUNT: 14.1 % (ref 10–15)
HCT VFR BLD AUTO: 37.9 % (ref 35–47)
HGB BLD-MCNC: 12.9 G/DL (ref 11.7–15.7)
MCH RBC QN AUTO: 31.6 PG (ref 26.5–33)
MCHC RBC AUTO-ENTMCNC: 34 G/DL (ref 31.5–36.5)
MCV RBC AUTO: 93 FL (ref 78–100)
PLATELET # BLD AUTO: 250 10E3/UL (ref 150–450)
RBC # BLD AUTO: 4.08 10E6/UL (ref 3.8–5.2)
SPECIMEN EXPIRATION DATE: NORMAL
WBC # BLD AUTO: 11.1 10E3/UL (ref 4–11)

## 2022-04-27 PROCEDURE — 87340 HEPATITIS B SURFACE AG IA: CPT | Performed by: ADVANCED PRACTICE MIDWIFE

## 2022-04-27 PROCEDURE — 87389 HIV-1 AG W/HIV-1&-2 AB AG IA: CPT | Performed by: ADVANCED PRACTICE MIDWIFE

## 2022-04-27 PROCEDURE — 86901 BLOOD TYPING SEROLOGIC RH(D): CPT | Performed by: ADVANCED PRACTICE MIDWIFE

## 2022-04-27 PROCEDURE — 85027 COMPLETE CBC AUTOMATED: CPT | Performed by: ADVANCED PRACTICE MIDWIFE

## 2022-04-27 PROCEDURE — 86762 RUBELLA ANTIBODY: CPT | Performed by: ADVANCED PRACTICE MIDWIFE

## 2022-04-27 PROCEDURE — 36415 COLL VENOUS BLD VENIPUNCTURE: CPT | Performed by: ADVANCED PRACTICE MIDWIFE

## 2022-04-27 PROCEDURE — 86900 BLOOD TYPING SEROLOGIC ABO: CPT | Performed by: ADVANCED PRACTICE MIDWIFE

## 2022-04-27 PROCEDURE — 86780 TREPONEMA PALLIDUM: CPT | Performed by: ADVANCED PRACTICE MIDWIFE

## 2022-04-27 PROCEDURE — 76801 OB US < 14 WKS SINGLE FETUS: CPT | Performed by: OBSTETRICS & GYNECOLOGY

## 2022-04-27 PROCEDURE — 86803 HEPATITIS C AB TEST: CPT | Performed by: ADVANCED PRACTICE MIDWIFE

## 2022-04-27 PROCEDURE — 86850 RBC ANTIBODY SCREEN: CPT | Performed by: ADVANCED PRACTICE MIDWIFE

## 2022-04-27 PROCEDURE — 99207 PR FIRST OB VISIT: CPT | Performed by: ADVANCED PRACTICE MIDWIFE

## 2022-04-27 RX ORDER — LACTULOSE 10 G/10G
SOLUTION ORAL
Qty: 60 PACKET | Refills: 6 | Status: SHIPPED | OUTPATIENT
Start: 2022-04-27 | End: 2022-04-27

## 2022-04-27 RX ORDER — LACTULOSE 10 G/15ML
SOLUTION ORAL
Qty: 473 ML | Refills: 6
Start: 2022-04-27 | End: 2022-04-27

## 2022-04-27 NOTE — TELEPHONE ENCOUNTER
Pharmacy calling:  Lactulose packets not covered. Only liquid.  Ok to substitute  Lactulose 10mg/15ml  Take 15-30 ml daily as needed for constipation.  Dispense 473 ml  Refill x 6.    Esthela Johnston RN

## 2022-04-27 NOTE — NURSING NOTE
"Chief Complaint   Patient presents with     Prenatal Care     First OB visit, patient is 9 weeks 0 days, has had some spotting with US follow-ups and everything is good. No c/o of cramping.   Patient will do invitae next week when she is 10 weeks.  No other questions or concerns.        Initial BP 98/56   Wt 60.9 kg (134 lb 3.2 oz)   LMP 2022 (Within Days)   BMI 21.66 kg/m   Estimated body mass index is 21.66 kg/m  as calculated from the following:    Height as of 11/3/21: 1.676 m (5' 6\").    Weight as of this encounter: 60.9 kg (134 lb 3.2 oz).  BP completed using cuff size: regular    Questioned patient about current smoking habits.  Pt. has never smoked.          The following HM Due: NONE      Hiro Rosenthal CMA               "

## 2022-04-27 NOTE — PROGRESS NOTES
is a partnered  4 para 1 0 2 1 giving an EDC by ultrasound of 21 who presents for a new OB Visit.   She has had bleeding since her LMP that has now resolved.  She has had mild nausea.. Weigh loss   has not occurred.. She denies fever, chills and viral infections since her last LMP.  There is moderatefatigue.  This was a planned pregnancy.  She is a previous CNM patient.    =========================================    INFECTION HISTORY  HIV: no  Hepatitis B: no  Hepatitis C: no  Tuberculosis: no    Herpes self: no  Herpes partner:  no   Chlamydia: no, declines testing   Gonorrhea: no, declines testing   Trichomonis: no  Syphilis:  no  ============================================    Low Dose Aspirin for Preeclampsia Prevention  Assessment:    Consider for those with 1 high risk or 2  moderate risk factors         High risk: previous pregnancy with preeclampsia, multifetal gestation, chronic htn, diabetes, chronic kidney disease, autoimmune disorder         Medium risk: nulliparity, BMI >30, family hx preeclampsia, age >/= 35,  race, low SES, personal risk factors (hx low birth weight, hx stillbirth, >10yrs between pregnancies).    Patient does not qualify for low dose aspirin therapy    PERSONAL/SOCIAL HISTORY  Lives lives with their family.  Employment: Full time.    =============================================    REVIEW OF SYSTEMS  CONSTITUTIONAL: Denies fever, chills and night sweats  DIET: Appetite is continue.  Eats a regular.  Caffeine intake daily is .    Plans to gain 25-35 pounds with her pregnancy.  SKIN: Denies changes and suspicious moles or lesions.  ENT: Denies blurred vision, hearing loss, tinnitus, frequent colds, epistaxis, hoarseness and tooth pain.    ENDOCRINE: Denies heat and cold intolerance, and polydypsia  BREASTS:  Tender since LMP.  Denies discharge and lumps.   HEART/LUNGS: Denies dyspnea, wheezing, coughing and chest pain.  HEMATOLOGIC: Denies tendency to  bruise and history of blood clots.  GASTROINTESTINAL: Denies heartburn, indigestion and change of color in stools.    GENITOURINARY:  Denies urgency, dysuria and hematuria.  Has frequency of urination since LMP.   NEUROLOGIC:  Denies seizures, weakness and fainting.  PSYCHIATRIC:  Denies mood changes  ===========================================    PHYSICAL EXAM  GENERAL:   pleasant pregnant female, alert, cooperative and well groomed.  SKIN:  Warm and dry, without lesions or tenderness.    HEAD: Symmetrical features.  EYES:  PERRLA  EARS: Tympanic membranes gray, translucent and intact.  NOSE: Masked   MOUTH:  Masked    NECK:  Thyroid without enlargement and nodules.  Lymph nodes not palpable.   LUNGS:  Clear to auscultation.  BREAST:  Declined exam   HEART:  RRR without murmur.  ABDOMEN: Soft without masses or tenderness.  No CVA tenderness.    MUSCULOSKELETAL:  Full range of motion  GENITALIA: Declined exam   EXTREMITIES:  2+/2+ DTR, No edema. No significant varicosities.  ===================================================    PLAN:  Instructed on use of triage nurse line and contacting the on call CNM after hours in an emergency.    Discussed the indications, uses for and false positives for genetic testing and fetal survey ultrasounds at 18-20 weeks gestation.  She will schedule Nurse only visit for NIPS.  Will return to the clinic in 4 weeks for her next routine prenatal check.  Will call to be seen sooner if problem arise.  Oriented to CNM Service and added to list.    Reviewed US results - she does have history of constipation and is struggling with constipation right now.  Lactulose reordered as requested.  This has helped her in the past.  We reviewed high water, fiber, walking, stool softeners.  She will let us know if she needs more assistance.     Beth Michael, DNP, APRN, CNM

## 2022-04-27 NOTE — TELEPHONE ENCOUNTER
Fax received from [facility] that Kristalose 10gm packet are not covered.  Is there an alternative like liquid instead of packets you want to send in?  Or a PA could be started.    Tatyana Diza RN

## 2022-04-28 LAB
HBV SURFACE AG SERPL QL IA: NONREACTIVE
HIV 1+2 AB+HIV1 P24 AG SERPL QL IA: NONREACTIVE
RUBV IGG SERPL QL IA: 1.77 INDEX
RUBV IGG SERPL QL IA: POSITIVE
T PALLIDUM AB SER QL: NONREACTIVE

## 2022-04-29 LAB — HCV AB SERPL QL IA: NONREACTIVE

## 2022-05-03 ENCOUNTER — TELEPHONE (OUTPATIENT)
Dept: MIDWIFE SERVICES | Facility: CLINIC | Age: 22
End: 2022-05-03
Payer: COMMERCIAL

## 2022-05-03 ENCOUNTER — HOSPITAL ENCOUNTER (EMERGENCY)
Facility: CLINIC | Age: 22
Discharge: LEFT BEFORE DISCHARGE | End: 2022-05-03
Admitting: EMERGENCY MEDICINE
Payer: COMMERCIAL

## 2022-05-03 PROCEDURE — 999N000104 HC STATISTIC NO CHARGE

## 2022-05-03 PROCEDURE — 93005 ELECTROCARDIOGRAM TRACING: CPT

## 2022-05-03 NOTE — TELEPHONE ENCOUNTER
9w6d    Pt calls complaining of SOB and chest pain that started last night.    No arm or shoulder pain. SOB when she is at rest.  She has not had these sx before.    Advised to go to ED for eval.  Pt agrees with this plan.    Susanne MAYER RN BSN

## 2022-05-04 ENCOUNTER — ALLIED HEALTH/NURSE VISIT (OUTPATIENT)
Dept: NURSING | Facility: CLINIC | Age: 22
End: 2022-05-04
Payer: COMMERCIAL

## 2022-05-04 DIAGNOSIS — Z34.90 SUPERVISION OF NORMAL PREGNANCY: Primary | ICD-10-CM

## 2022-05-04 PROCEDURE — 36415 COLL VENOUS BLD VENIPUNCTURE: CPT

## 2022-05-04 PROCEDURE — 99207 PR NO CHARGE NURSE ONLY: CPT

## 2022-05-04 NOTE — NURSING NOTE
Pt here for Invitae, forms filled out, she wanted to know sex of baby, sent to lab with Invitae forma and insurance info.  Randi Mantilla, CMA

## 2022-05-10 LAB — SCANNED LAB RESULT: NORMAL

## 2022-05-25 ENCOUNTER — PRENATAL OFFICE VISIT (OUTPATIENT)
Dept: MIDWIFE SERVICES | Facility: CLINIC | Age: 22
End: 2022-05-25
Payer: COMMERCIAL

## 2022-05-25 VITALS
HEIGHT: 66 IN | DIASTOLIC BLOOD PRESSURE: 70 MMHG | WEIGHT: 133.1 LBS | SYSTOLIC BLOOD PRESSURE: 110 MMHG | BODY MASS INDEX: 21.39 KG/M2

## 2022-05-25 DIAGNOSIS — Z34.92 PRENATAL CARE IN SECOND TRIMESTER: Primary | ICD-10-CM

## 2022-05-25 PROCEDURE — 99207 PR PRENATAL VISIT: CPT | Performed by: ADVANCED PRACTICE MIDWIFE

## 2022-05-25 RX ORDER — LACTULOSE 10 G/15ML
SOLUTION ORAL
COMMUNITY
Start: 2022-04-27 | End: 2022-05-25

## 2022-06-24 ENCOUNTER — PRENATAL OFFICE VISIT (OUTPATIENT)
Dept: OBGYN | Facility: CLINIC | Age: 22
End: 2022-06-24
Payer: COMMERCIAL

## 2022-06-24 VITALS — DIASTOLIC BLOOD PRESSURE: 60 MMHG | SYSTOLIC BLOOD PRESSURE: 116 MMHG | WEIGHT: 131 LBS | BODY MASS INDEX: 21.14 KG/M2

## 2022-06-24 DIAGNOSIS — Z34.82 ENCOUNTER FOR SUPERVISION OF OTHER NORMAL PREGNANCY IN SECOND TRIMESTER: Primary | ICD-10-CM

## 2022-06-24 PROCEDURE — 99207 PR PRENATAL VISIT: CPT | Performed by: OBSTETRICS & GYNECOLOGY

## 2022-06-24 NOTE — PROGRESS NOTES
IUP at 17w2d,    Closely spaced pregnancies (13mo at delivery)    1) Routine PNC  - anatomy scan scheduled  - PP:  plans vasectomy but Lorrie has hx of heavy periods and want something.     2) Constipation  - BMs once weekly. On colace. Will add miralax and increase fluids.    Return to clinic 4-5w.     Mili Ornelas MD

## 2022-07-19 ENCOUNTER — ANCILLARY PROCEDURE (OUTPATIENT)
Dept: ULTRASOUND IMAGING | Facility: CLINIC | Age: 22
End: 2022-07-19
Attending: OBSTETRICS & GYNECOLOGY
Payer: COMMERCIAL

## 2022-07-19 DIAGNOSIS — Z34.82 ENCOUNTER FOR SUPERVISION OF OTHER NORMAL PREGNANCY IN SECOND TRIMESTER: ICD-10-CM

## 2022-07-19 PROCEDURE — 76805 OB US >/= 14 WKS SNGL FETUS: CPT | Performed by: OBSTETRICS & GYNECOLOGY

## 2022-07-20 ENCOUNTER — TRANSCRIBE ORDERS (OUTPATIENT)
Dept: MATERNAL FETAL MEDICINE | Facility: CLINIC | Age: 22
End: 2022-07-20

## 2022-07-20 DIAGNOSIS — Z34.82 ENCOUNTER FOR SUPERVISION OF OTHER NORMAL PREGNANCY IN SECOND TRIMESTER: Primary | ICD-10-CM

## 2022-07-20 DIAGNOSIS — O26.90 PREGNANCY RELATED CONDITION, ANTEPARTUM: Primary | ICD-10-CM

## 2022-07-25 ENCOUNTER — PRENATAL OFFICE VISIT (OUTPATIENT)
Dept: MIDWIFE SERVICES | Facility: CLINIC | Age: 22
End: 2022-07-25
Payer: COMMERCIAL

## 2022-07-25 VITALS — BODY MASS INDEX: 21.63 KG/M2 | SYSTOLIC BLOOD PRESSURE: 96 MMHG | WEIGHT: 134 LBS | DIASTOLIC BLOOD PRESSURE: 62 MMHG

## 2022-07-25 DIAGNOSIS — Z34.82 ENCOUNTER FOR SUPERVISION OF OTHER NORMAL PREGNANCY IN SECOND TRIMESTER: Primary | ICD-10-CM

## 2022-07-25 PROCEDURE — 99207 PR PRENATAL VISIT: CPT | Performed by: ADVANCED PRACTICE MIDWIFE

## 2022-07-25 NOTE — PROGRESS NOTES
"S: Feels well,  Has started feeling fetal movement.  Denies uterine cramping, vaginal bleeding or leaking of fluid.  States she feels \"much better\" than she did during her last pregnancy.   Reports she was having some trouble scheduling with CNMs on [facility], but does plan to see midwives for remainder of pregnancy.   Weight gain has been hovering around 134 for entire pregnancy. Lorrie reports this was a similar pattern with her first baby, who was born AGA. Denies any issues with n/v.        Discussed 20wk anatomy US.   Having a girl!   Per MD:  Placenta is posterior and mid. There is a probable circumvallate appearance to the placenta, with synechiae/amniotic band seen.  There is debris at the level of the cervix; reviewed these images in consultation with M, who agreed with the above.    O: Vitals: BP 96/62 (Cuff Size: Adult Regular)   Wt 60.8 kg (134 lb)   LMP 02/23/2022 (Within Days)   BMI 21.63 kg/m    BMI= Body mass index is 21.63 kg/m .  Exam:  Constitutional: healthy, alert and no distress  Respiratory: respirations even and unlabored  Gastrointestinal: Abdomen soft, non-tender. Fundus measures appropriate for gestational age. Fetal heart tones hear without difficulty and within normal limits  : Deferred  Psychiatric: mentation appears normal and affect normal/bright  A/P:  1. (Z34.82) Encounter for supervision of other normal pregnancy in second trimester  (primary encounter diagnosis)         Discussed 20 week fetal screen - possible circumvallate placenta, amniotic band. Follow up scheduled with M  Encouraged patient to call with any questions or concerns.      ARIAN Bal, THEO      "

## 2022-08-08 ENCOUNTER — PRE VISIT (OUTPATIENT)
Dept: MATERNAL FETAL MEDICINE | Facility: CLINIC | Age: 22
End: 2022-08-08

## 2022-08-15 ENCOUNTER — OFFICE VISIT (OUTPATIENT)
Dept: MATERNAL FETAL MEDICINE | Facility: CLINIC | Age: 22
End: 2022-08-15
Attending: OBSTETRICS & GYNECOLOGY
Payer: COMMERCIAL

## 2022-08-15 ENCOUNTER — HOSPITAL ENCOUNTER (OUTPATIENT)
Dept: ULTRASOUND IMAGING | Facility: CLINIC | Age: 22
Discharge: HOME | End: 2022-08-15
Attending: OBSTETRICS & GYNECOLOGY
Payer: COMMERCIAL

## 2022-08-15 DIAGNOSIS — O26.90 PREGNANCY RELATED CONDITION, ANTEPARTUM: ICD-10-CM

## 2022-08-15 DIAGNOSIS — O43.112 CIRCUMVALLATE PLACENTA DURING PREGNANCY IN SECOND TRIMESTER, ANTEPARTUM: Primary | ICD-10-CM

## 2022-08-15 PROCEDURE — 76811 OB US DETAILED SNGL FETUS: CPT | Mod: 26 | Performed by: OBSTETRICS & GYNECOLOGY

## 2022-08-15 PROCEDURE — 76811 OB US DETAILED SNGL FETUS: CPT

## 2022-08-15 NOTE — PROGRESS NOTES
"Please see \"Imaging\" tab under \"Chart Review\" for details of today's visit.    Wesley Sylvester    "

## 2022-08-17 NOTE — HPI: SKIN LESIONS
How Severe Is Your Skin Lesion?: moderate

## 2022-08-22 ENCOUNTER — PRENATAL OFFICE VISIT (OUTPATIENT)
Dept: MIDWIFE SERVICES | Facility: CLINIC | Age: 22
End: 2022-08-22
Payer: COMMERCIAL

## 2022-08-22 VITALS — WEIGHT: 141.3 LBS | DIASTOLIC BLOOD PRESSURE: 62 MMHG | BODY MASS INDEX: 22.81 KG/M2 | SYSTOLIC BLOOD PRESSURE: 102 MMHG

## 2022-08-22 DIAGNOSIS — Z34.82 ENCOUNTER FOR SUPERVISION OF OTHER NORMAL PREGNANCY IN SECOND TRIMESTER: Primary | ICD-10-CM

## 2022-08-22 DIAGNOSIS — O43.112 CIRCUMVALLATE PLACENTA IN SECOND TRIMESTER: ICD-10-CM

## 2022-08-22 PROCEDURE — 99207 PR PRENATAL VISIT: CPT | Performed by: ADVANCED PRACTICE MIDWIFE

## 2022-08-22 NOTE — PROGRESS NOTES
S: Feels well,  Baby active.  Denies uterine cramping, vaginal bleeding or leaking of fluid.    Had follow up US with MFM on 8/15  Circumvallate placenta noted.   Growth US recommended at 30 and 36wk.  EFW 50% at 24wk     O: Vitals: /62   Wt 64.1 kg (141 lb 4.8 oz)   LMP 02/23/2022 (Within Days)   BMI 22.81 kg/m    BMI= Body mass index is 22.81 kg/m .  Exam:  Constitutional: healthy, alert and no distress  Respiratory: respirations even and unlabored  Gastrointestinal: Abdomen soft, non-tender. Fundus measures appropriate for gestational age. Fetal heart tones hear without difficulty and within normal limits  : Deferred  Psychiatric: mentation appears normal and affect normal/bright  A/P:  1. (Z34.82) Encounter for supervision of other normal pregnancy in second trimester  (primary encounter diagnosis)      2. (O43.112) Circumvallate placenta in second trimester  - Serial growth US with MFM at 30 and 36wks         Discussed GCT/repeat RPR for next visit, handout provided, reminded of longer appointment.  Tdap next visit; reviewed CDC recommendations and partner/family vaccination recommended as well.  Need for Rhogam? No, to be done next visit   Encouraged patient to call with any questions or concerns.  Return to clinic 3 weeks    ARIAN Bal, THEO

## 2022-08-22 NOTE — NURSING NOTE
"Chief Complaint   Patient presents with     Prenatal Care   25w5d  No concerns    initial /62   Wt 64.1 kg (141 lb 4.8 oz)   LMP 02/23/2022 (Within Days)   BMI 22.81 kg/m   Estimated body mass index is 22.81 kg/m  as calculated from the following:    Height as of 5/25/22: 1.676 m (5' 6\").    Weight as of this encounter: 64.1 kg (141 lb 4.8 oz).  BP completed using cuff size regular.  Madeline Chase CMA    "

## 2022-09-12 ENCOUNTER — PRENATAL OFFICE VISIT (OUTPATIENT)
Dept: MIDWIFE SERVICES | Facility: CLINIC | Age: 22
End: 2022-09-12
Payer: COMMERCIAL

## 2022-09-12 VITALS — WEIGHT: 142 LBS | DIASTOLIC BLOOD PRESSURE: 58 MMHG | BODY MASS INDEX: 22.92 KG/M2 | SYSTOLIC BLOOD PRESSURE: 92 MMHG

## 2022-09-12 DIAGNOSIS — Z34.83 ENCOUNTER FOR SUPERVISION OF OTHER NORMAL PREGNANCY IN THIRD TRIMESTER: Primary | ICD-10-CM

## 2022-09-12 DIAGNOSIS — O43.112 CIRCUMVALLATE PLACENTA IN SECOND TRIMESTER: ICD-10-CM

## 2022-09-12 LAB
ERYTHROCYTE [DISTWIDTH] IN BLOOD BY AUTOMATED COUNT: 12.5 % (ref 10–15)
GLUCOSE 1H P 50 G GLC PO SERPL-MCNC: 104 MG/DL (ref 70–129)
HCT VFR BLD AUTO: 34.3 % (ref 35–47)
HGB BLD-MCNC: 11.2 G/DL (ref 11.7–15.7)
MCH RBC QN AUTO: 29.9 PG (ref 26.5–33)
MCHC RBC AUTO-ENTMCNC: 32.7 G/DL (ref 31.5–36.5)
MCV RBC AUTO: 92 FL (ref 78–100)
PLATELET # BLD AUTO: 270 10E3/UL (ref 150–450)
RBC # BLD AUTO: 3.74 10E6/UL (ref 3.8–5.2)
WBC # BLD AUTO: 11.3 10E3/UL (ref 4–11)

## 2022-09-12 PROCEDURE — 36415 COLL VENOUS BLD VENIPUNCTURE: CPT | Performed by: ADVANCED PRACTICE MIDWIFE

## 2022-09-12 PROCEDURE — 90471 IMMUNIZATION ADMIN: CPT | Performed by: ADVANCED PRACTICE MIDWIFE

## 2022-09-12 PROCEDURE — 90715 TDAP VACCINE 7 YRS/> IM: CPT | Performed by: ADVANCED PRACTICE MIDWIFE

## 2022-09-12 PROCEDURE — 82950 GLUCOSE TEST: CPT | Performed by: ADVANCED PRACTICE MIDWIFE

## 2022-09-12 PROCEDURE — 99207 PR PRENATAL VISIT: CPT | Performed by: ADVANCED PRACTICE MIDWIFE

## 2022-09-12 PROCEDURE — 86780 TREPONEMA PALLIDUM: CPT | Performed by: ADVANCED PRACTICE MIDWIFE

## 2022-09-12 PROCEDURE — 85027 COMPLETE CBC AUTOMATED: CPT | Performed by: ADVANCED PRACTICE MIDWIFE

## 2022-09-12 NOTE — PROGRESS NOTES
S: Feels well,  Baby active.  Denies uterine cramping, vaginal bleeding or leaking of fluid.    Questions about elective IOL -  Faustino is a , so is gone working for sometimes months at a time. Hoping to schedule IOL around 39wks as she did with her first baby. Discussed we could likely make this happen as long as cervix is favorable, as it is important her partner be there for her delivery. We discussed elective IOLs often first to be bumped in case of staffing issues and she states understanding.     Faustino may be taking a 3 mo  job right after baby's birth. Whether or not he takes this will impact how long she takes leave - if he takes it she will take her full 12 wks.     EPDS 6      O: Vitals: BP 92/58 (BP Location: Left arm, Cuff Size: Adult Regular)   Wt 64.4 kg (142 lb)   LMP 02/23/2022 (Within Days)   BMI 22.92 kg/m    BMI= Body mass index is 22.92 kg/m .  Exam:  Constitutional: healthy, alert and no distress  Respiratory: respirations even and unlabored  Gastrointestinal: Abdomen soft, non-tender. Fundus measures appropriate for gestational age. Fetal heart tones hear without difficulty and within normal limits  : Deferred  Psychiatric: mentation appears normal and affect normal/bright  A/P:  1. (Z34.83) Encounter for supervision of other normal pregnancy in third trimester  (primary encounter diagnosis)  Plan: Glucose tolerance, gest screen, 1 hour, CBC         with platelets, Treponema Abs w Reflex to RPR         and Titer, TDAP VACCINE (Adacel, Boostrix)          [[id number]]    2. (O43.112) Circumvallate placenta in second trimester  - Growth US with MFM at 30w and 36w     GCT/repeat RPR today, handout provided.  Tdap given; reviewed CDC recommendations and partner/family vaccination recommended as well.  Need for Rhogam? No.     Encouraged patient to call with any questions or concerns.  Return to clinic 2 weeks    ARIAN Bal, CNM

## 2022-09-13 LAB — T PALLIDUM AB SER QL: NONREACTIVE

## 2022-09-15 ENCOUNTER — TELEPHONE (OUTPATIENT)
Dept: MIDWIFE SERVICES | Facility: CLINIC | Age: 22
End: 2022-09-15

## 2022-09-15 DIAGNOSIS — O21.9 NAUSEA AND VOMITING IN PREGNANCY: Primary | ICD-10-CM

## 2022-09-15 RX ORDER — ONDANSETRON 4 MG/1
4 TABLET, ORALLY DISINTEGRATING ORAL EVERY 8 HOURS PRN
Qty: 15 TABLET | Refills: 1 | Status: ON HOLD | OUTPATIENT
Start: 2022-09-15 | End: 2022-10-26

## 2022-09-15 NOTE — TELEPHONE ENCOUNTER
Spoke with Kathy, she plans to send Rx for antiemetic in for pt and have her hydrate orally.    Cancelled appt scheduled today, Rx can be sent to [facility] [employer] in [place] as on chart.  Reviewed to CB with worsening ctx/pain or increase in vaginal bleeding.    Tatyana Diaz RN

## 2022-09-15 NOTE — TELEPHONE ENCOUNTER
Pt calling, 29w1d, was vomiting yesterday and this AM.  Vomiting is new for her, does not feel ill otherwise, denies fever/chills.   Could not keep water down until just recently today.    States she had [place]re[place]ne Torres ctx yesterday and today.  Spotting just now when she wiped, brown in color slightly on her underwear and on the TP.    Felt normal FM today.    Reviewed that BH ctx are likely from dehydration, spotting could be result of vomiting as well.  Pt does have cirumvallate placenta and was advised to call with any bleeding.    I did schedule pt this afternoon with Kathy.  Please advise if you agree with seeing her in clinic or would like her to do anything differently.    Tatyana Diaz RN

## 2022-09-26 ENCOUNTER — HOSPITAL ENCOUNTER (OUTPATIENT)
Dept: ULTRASOUND IMAGING | Facility: CLINIC | Age: 22
Discharge: HOME | End: 2022-09-26
Attending: OBSTETRICS & GYNECOLOGY
Payer: COMMERCIAL

## 2022-09-26 ENCOUNTER — OFFICE VISIT (OUTPATIENT)
Dept: MATERNAL FETAL MEDICINE | Facility: CLINIC | Age: 22
End: 2022-09-26
Attending: OBSTETRICS & GYNECOLOGY
Payer: COMMERCIAL

## 2022-09-26 ENCOUNTER — PRENATAL OFFICE VISIT (OUTPATIENT)
Dept: MIDWIFE SERVICES | Facility: CLINIC | Age: 22
End: 2022-09-26

## 2022-09-26 VITALS — DIASTOLIC BLOOD PRESSURE: 68 MMHG | SYSTOLIC BLOOD PRESSURE: 104 MMHG | BODY MASS INDEX: 23.57 KG/M2 | WEIGHT: 146 LBS

## 2022-09-26 DIAGNOSIS — O43.113 CIRCUMVALLATE PLACENTA IN THIRD TRIMESTER: Primary | ICD-10-CM

## 2022-09-26 DIAGNOSIS — O43.112 CIRCUMVALLATE PLACENTA DURING PREGNANCY IN SECOND TRIMESTER, ANTEPARTUM: ICD-10-CM

## 2022-09-26 DIAGNOSIS — Z34.83 ENCOUNTER FOR SUPERVISION OF OTHER NORMAL PREGNANCY IN THIRD TRIMESTER: Primary | ICD-10-CM

## 2022-09-26 PROCEDURE — 76816 OB US FOLLOW-UP PER FETUS: CPT

## 2022-09-26 PROCEDURE — 99207 PR PRENATAL VISIT: CPT | Performed by: ADVANCED PRACTICE MIDWIFE

## 2022-09-26 PROCEDURE — 76816 OB US FOLLOW-UP PER FETUS: CPT | Mod: 26 | Performed by: OBSTETRICS & GYNECOLOGY

## 2022-09-26 NOTE — PROGRESS NOTES
S: Feels well overall,  Baby active.  Having Denies uterine cramping, vaginal bleeding or leaking of fluid  O: Vitals: /68 (BP Location: Right arm, Cuff Size: Adult Regular)   Wt 66.2 kg (146 lb)   LMP 02/23/2022 (Within Days)   BMI 23.57 kg/m    BMI= Body mass index is 23.57 kg/m .  Exam:  Constitutional: healthy, alert and no distress  Respiratory: respirations even and unlabored  Gastrointestinal: Abdomen soft, non-tender. Fundus measures appropriate for gestational age. Fetal heart tones hear without difficulty and within normal limits  : Deferred  Psychiatric: mentation appears normal and affect normal/bright  A:     ICD-10-CM    1. Encounter for supervision of other normal pregnancy in third trimester  Z34.83      P: Discussed plans for labor. Discussed patient options for postpartum contraception. Patient is planning vasectomy and thinking about a hormonal option to regulate bleeding  Encouraged patient to call with any questions or concerns.  Return to clinic 2 weeks    ARIAN Mauricio CNM

## 2022-09-26 NOTE — PROGRESS NOTES
"Please see \"imaging\" tab under \"Chart Review\" for details of today's US at the [facility].    Marlon Chew MD  Maternal-Fetal Medicine    "

## 2022-10-06 ENCOUNTER — HOSPITAL ENCOUNTER (INPATIENT)
Facility: CLINIC | Age: 22
LOS: 2 days | Discharge: HOME | End: 2022-10-08
Attending: SPECIALIST | Admitting: SPECIALIST
Payer: COMMERCIAL

## 2022-10-06 ENCOUNTER — APPOINTMENT (OUTPATIENT)
Dept: ULTRASOUND IMAGING | Facility: CLINIC | Age: 22
End: 2022-10-06
Attending: SPECIALIST
Payer: COMMERCIAL

## 2022-10-06 ENCOUNTER — TELEPHONE (OUTPATIENT)
Dept: MIDWIFE SERVICES | Facility: CLINIC | Age: 22
End: 2022-10-06

## 2022-10-06 PROBLEM — B96.89 BACTERIAL VAGINOSIS IN PREGNANCY: Status: RESOLVED | Noted: 2021-09-24 | Resolved: 2022-10-06

## 2022-10-06 PROBLEM — O23.599 BACTERIAL VAGINOSIS IN PREGNANCY: Status: RESOLVED | Noted: 2021-09-24 | Resolved: 2022-10-06

## 2022-10-06 LAB
ABO/RH(D): NORMAL
ALBUMIN UR-MCNC: NEGATIVE MG/DL
AMORPH CRY #/AREA URNS HPF: ABNORMAL /HPF
ANTIBODY SCREEN: NEGATIVE
APPEARANCE UR: ABNORMAL
BASOPHILS # BLD AUTO: 0 10E3/UL (ref 0–0.2)
BASOPHILS NFR BLD AUTO: 0 %
BILIRUB UR QL STRIP: NEGATIVE
CLUE CELLS: NORMAL
COLOR UR AUTO: ABNORMAL
EOSINOPHIL # BLD AUTO: 0.1 10E3/UL (ref 0–0.7)
EOSINOPHIL NFR BLD AUTO: 1 %
ERYTHROCYTE [DISTWIDTH] IN BLOOD BY AUTOMATED COUNT: 12.8 % (ref 10–15)
FFN SPECIMEN INTEGRITY: NORMAL
FIBRONECTIN FETAL VAG QL: NEGATIVE
FLUAV RNA SPEC QL NAA+PROBE: NEGATIVE
FLUBV RNA RESP QL NAA+PROBE: NEGATIVE
GLUCOSE UR STRIP-MCNC: NEGATIVE MG/DL
HCT VFR BLD AUTO: 32.5 % (ref 35–47)
HGB BLD-MCNC: 10.5 G/DL (ref 11.7–15.7)
HGB UR QL STRIP: NEGATIVE
HOLD SPECIMEN: NORMAL
HOLD SPECIMEN: NORMAL
IMM GRANULOCYTES # BLD: 0 10E3/UL
IMM GRANULOCYTES NFR BLD: 0 %
KETONES UR STRIP-MCNC: 10 MG/DL
LEUKOCYTE ESTERASE UR QL STRIP: NEGATIVE
LYMPHOCYTES # BLD AUTO: 2.4 10E3/UL (ref 0.8–5.3)
LYMPHOCYTES NFR BLD AUTO: 23 %
MCH RBC QN AUTO: 28.9 PG (ref 26.5–33)
MCHC RBC AUTO-ENTMCNC: 32.3 G/DL (ref 31.5–36.5)
MCV RBC AUTO: 90 FL (ref 78–100)
MONOCYTES # BLD AUTO: 0.5 10E3/UL (ref 0–1.3)
MONOCYTES NFR BLD AUTO: 5 %
NEUTROPHILS # BLD AUTO: 7.5 10E3/UL (ref 1.6–8.3)
NEUTROPHILS NFR BLD AUTO: 71 %
NITRATE UR QL: NEGATIVE
NRBC # BLD AUTO: 0 10E3/UL
NRBC BLD AUTO-RTO: 0 /100
PH UR STRIP: 7 [PH] (ref 5–7)
PLATELET # BLD AUTO: 248 10E3/UL (ref 150–450)
RBC # BLD AUTO: 3.63 10E6/UL (ref 3.8–5.2)
RBC URINE: 0 /HPF
RSV RNA SPEC NAA+PROBE: POSITIVE
SARS-COV-2 RNA RESP QL NAA+PROBE: NEGATIVE
SP GR UR STRIP: 1.01 (ref 1–1.03)
SPECIMEN EXPIRATION DATE: NORMAL
SQUAMOUS EPITHELIAL: <1 /HPF
TRICHOMONAS, WET PREP: NORMAL
UROBILINOGEN UR STRIP-MCNC: NORMAL MG/DL
WBC # BLD AUTO: 10.5 10E3/UL (ref 4–11)
WBC URINE: <1 /HPF
WBC'S/HIGH POWER FIELD, WET PREP: NORMAL
YEAST, WET PREP: NORMAL

## 2022-10-06 PROCEDURE — G0463 HOSPITAL OUTPT CLINIC VISIT: HCPCS

## 2022-10-06 PROCEDURE — 120N000001 HC R&B MED SURG/OB

## 2022-10-06 PROCEDURE — 85025 COMPLETE CBC W/AUTO DIFF WBC: CPT | Performed by: SPECIALIST

## 2022-10-06 PROCEDURE — 82731 ASSAY OF FETAL FIBRONECTIN: CPT | Performed by: SPECIALIST

## 2022-10-06 PROCEDURE — 250N000013 HC RX MED GY IP 250 OP 250 PS 637: Performed by: SPECIALIST

## 2022-10-06 PROCEDURE — 250N000011 HC RX IP 250 OP 636

## 2022-10-06 PROCEDURE — 81001 URINALYSIS AUTO W/SCOPE: CPT | Performed by: SPECIALIST

## 2022-10-06 PROCEDURE — 999N000128 HC STATISTIC PERIPHERAL IV START W/O US GUIDANCE

## 2022-10-06 PROCEDURE — 86850 RBC ANTIBODY SCREEN: CPT | Performed by: SPECIALIST

## 2022-10-06 PROCEDURE — 250N000013 HC RX MED GY IP 250 OP 250 PS 637

## 2022-10-06 PROCEDURE — 59025 FETAL NON-STRESS TEST: CPT

## 2022-10-06 PROCEDURE — 86901 BLOOD TYPING SEROLOGIC RH(D): CPT | Performed by: SPECIALIST

## 2022-10-06 PROCEDURE — 87653 STREP B DNA AMP PROBE: CPT | Performed by: SPECIALIST

## 2022-10-06 PROCEDURE — 36415 COLL VENOUS BLD VENIPUNCTURE: CPT | Performed by: SPECIALIST

## 2022-10-06 PROCEDURE — 87637 SARSCOV2&INF A&B&RSV AMP PRB: CPT | Performed by: SPECIALIST

## 2022-10-06 PROCEDURE — 87210 SMEAR WET MOUNT SALINE/INK: CPT | Performed by: SPECIALIST

## 2022-10-06 PROCEDURE — 87086 URINE CULTURE/COLONY COUNT: CPT | Performed by: SPECIALIST

## 2022-10-06 PROCEDURE — 258N000003 HC RX IP 258 OP 636: Performed by: SPECIALIST

## 2022-10-06 PROCEDURE — 76770 US EXAM ABDO BACK WALL COMP: CPT

## 2022-10-06 PROCEDURE — 250N000011 HC RX IP 250 OP 636: Performed by: SPECIALIST

## 2022-10-06 PROCEDURE — 99231 SBSQ HOSP IP/OBS SF/LOW 25: CPT | Performed by: NURSE PRACTITIONER

## 2022-10-06 RX ORDER — NALOXONE HYDROCHLORIDE 0.4 MG/ML
0.2 INJECTION, SOLUTION INTRAMUSCULAR; INTRAVENOUS; SUBCUTANEOUS
Status: DISCONTINUED | OUTPATIENT
Start: 2022-10-06 | End: 2022-10-08 | Stop reason: HOSPADM

## 2022-10-06 RX ORDER — ONDANSETRON 2 MG/ML
4 INJECTION INTRAMUSCULAR; INTRAVENOUS EVERY 6 HOURS PRN
Status: DISCONTINUED | OUTPATIENT
Start: 2022-10-06 | End: 2022-10-06 | Stop reason: HOSPADM

## 2022-10-06 RX ORDER — MAGNESIUM SULFATE HEPTAHYDRATE 40 MG/ML
4 INJECTION, SOLUTION INTRAVENOUS ONCE
Status: COMPLETED | OUTPATIENT
Start: 2022-10-06 | End: 2022-10-06

## 2022-10-06 RX ORDER — CEFAZOLIN SODIUM 2 G/100ML
2 INJECTION, SOLUTION INTRAVENOUS EVERY 6 HOURS
Status: DISCONTINUED | OUTPATIENT
Start: 2022-10-06 | End: 2022-10-07

## 2022-10-06 RX ORDER — NIFEDIPINE 10 MG/1
CAPSULE ORAL
Status: COMPLETED
Start: 2022-10-06 | End: 2022-10-06

## 2022-10-06 RX ORDER — METOCLOPRAMIDE HYDROCHLORIDE 5 MG/ML
10 INJECTION INTRAMUSCULAR; INTRAVENOUS EVERY 6 HOURS PRN
Status: DISCONTINUED | OUTPATIENT
Start: 2022-10-06 | End: 2022-10-08 | Stop reason: HOSPADM

## 2022-10-06 RX ORDER — NALOXONE HYDROCHLORIDE 0.4 MG/ML
0.4 INJECTION, SOLUTION INTRAMUSCULAR; INTRAVENOUS; SUBCUTANEOUS
Status: DISCONTINUED | OUTPATIENT
Start: 2022-10-06 | End: 2022-10-08 | Stop reason: HOSPADM

## 2022-10-06 RX ORDER — PROCHLORPERAZINE MALEATE 5 MG
10 TABLET ORAL EVERY 6 HOURS PRN
Status: DISCONTINUED | OUTPATIENT
Start: 2022-10-06 | End: 2022-10-08 | Stop reason: HOSPADM

## 2022-10-06 RX ORDER — ONDANSETRON 4 MG/1
4 TABLET, ORALLY DISINTEGRATING ORAL EVERY 6 HOURS PRN
Status: DISCONTINUED | OUTPATIENT
Start: 2022-10-06 | End: 2022-10-06 | Stop reason: HOSPADM

## 2022-10-06 RX ORDER — ACETAMINOPHEN 325 MG/1
975 TABLET ORAL EVERY 8 HOURS
Status: DISCONTINUED | OUTPATIENT
Start: 2022-10-06 | End: 2022-10-08 | Stop reason: HOSPADM

## 2022-10-06 RX ORDER — ONDANSETRON 2 MG/ML
4 INJECTION INTRAMUSCULAR; INTRAVENOUS EVERY 6 HOURS PRN
Status: DISCONTINUED | OUTPATIENT
Start: 2022-10-06 | End: 2022-10-08 | Stop reason: HOSPADM

## 2022-10-06 RX ORDER — SODIUM CHLORIDE, SODIUM LACTATE, POTASSIUM CHLORIDE, CALCIUM CHLORIDE 600; 310; 30; 20 MG/100ML; MG/100ML; MG/100ML; MG/100ML
INJECTION, SOLUTION INTRAVENOUS CONTINUOUS
Status: DISCONTINUED | OUTPATIENT
Start: 2022-10-06 | End: 2022-10-06

## 2022-10-06 RX ORDER — CEFAZOLIN SODIUM 2 G/100ML
INJECTION, SOLUTION INTRAVENOUS
Status: COMPLETED
Start: 2022-10-06 | End: 2022-10-06

## 2022-10-06 RX ORDER — ONDANSETRON 4 MG/1
4 TABLET, ORALLY DISINTEGRATING ORAL EVERY 6 HOURS PRN
Status: DISCONTINUED | OUTPATIENT
Start: 2022-10-06 | End: 2022-10-08 | Stop reason: HOSPADM

## 2022-10-06 RX ORDER — PROCHLORPERAZINE 25 MG
25 SUPPOSITORY, RECTAL RECTAL EVERY 12 HOURS PRN
Status: DISCONTINUED | OUTPATIENT
Start: 2022-10-06 | End: 2022-10-06 | Stop reason: HOSPADM

## 2022-10-06 RX ORDER — PROCHLORPERAZINE 25 MG
25 SUPPOSITORY, RECTAL RECTAL EVERY 12 HOURS PRN
Status: DISCONTINUED | OUTPATIENT
Start: 2022-10-06 | End: 2022-10-08 | Stop reason: HOSPADM

## 2022-10-06 RX ORDER — OXYCODONE HYDROCHLORIDE 5 MG/1
5 TABLET ORAL EVERY 4 HOURS PRN
Status: DISCONTINUED | OUTPATIENT
Start: 2022-10-06 | End: 2022-10-08 | Stop reason: HOSPADM

## 2022-10-06 RX ORDER — PROCHLORPERAZINE MALEATE 5 MG
10 TABLET ORAL EVERY 6 HOURS PRN
Status: DISCONTINUED | OUTPATIENT
Start: 2022-10-06 | End: 2022-10-06 | Stop reason: HOSPADM

## 2022-10-06 RX ORDER — MAGNESIUM SULFATE IN WATER 40 MG/ML
2 INJECTION, SOLUTION INTRAVENOUS CONTINUOUS
Status: DISCONTINUED | OUTPATIENT
Start: 2022-10-06 | End: 2022-10-08 | Stop reason: HOSPADM

## 2022-10-06 RX ORDER — FENTANYL CITRATE 50 UG/ML
INJECTION, SOLUTION INTRAMUSCULAR; INTRAVENOUS
Status: COMPLETED
Start: 2022-10-06 | End: 2022-10-06

## 2022-10-06 RX ORDER — METOCLOPRAMIDE HYDROCHLORIDE 5 MG/ML
10 INJECTION INTRAMUSCULAR; INTRAVENOUS EVERY 6 HOURS PRN
Status: DISCONTINUED | OUTPATIENT
Start: 2022-10-06 | End: 2022-10-06 | Stop reason: HOSPADM

## 2022-10-06 RX ORDER — HYDROXYZINE HYDROCHLORIDE 25 MG/1
50-100 TABLET, FILM COATED ORAL EVERY 6 HOURS PRN
Status: DISCONTINUED | OUTPATIENT
Start: 2022-10-06 | End: 2022-10-08 | Stop reason: HOSPADM

## 2022-10-06 RX ORDER — METOCLOPRAMIDE 10 MG/1
10 TABLET ORAL EVERY 6 HOURS PRN
Status: DISCONTINUED | OUTPATIENT
Start: 2022-10-06 | End: 2022-10-08 | Stop reason: HOSPADM

## 2022-10-06 RX ORDER — HYDROXYZINE HYDROCHLORIDE 25 MG/1
50 TABLET, FILM COATED ORAL EVERY 6 HOURS PRN
Status: DISCONTINUED | OUTPATIENT
Start: 2022-10-06 | End: 2022-10-08 | Stop reason: HOSPADM

## 2022-10-06 RX ORDER — METOCLOPRAMIDE 10 MG/1
10 TABLET ORAL EVERY 6 HOURS PRN
Status: DISCONTINUED | OUTPATIENT
Start: 2022-10-06 | End: 2022-10-06 | Stop reason: HOSPADM

## 2022-10-06 RX ORDER — NIFEDIPINE 10 MG/1
10 CAPSULE ORAL EVERY 6 HOURS PRN
Status: DISCONTINUED | OUTPATIENT
Start: 2022-10-06 | End: 2022-10-08 | Stop reason: HOSPADM

## 2022-10-06 RX ORDER — NIFEDIPINE 10 MG/1
20 CAPSULE ORAL ONCE
Status: COMPLETED | OUTPATIENT
Start: 2022-10-06 | End: 2022-10-06

## 2022-10-06 RX ORDER — SODIUM CHLORIDE, SODIUM LACTATE, POTASSIUM CHLORIDE, CALCIUM CHLORIDE 600; 310; 30; 20 MG/100ML; MG/100ML; MG/100ML; MG/100ML
INJECTION, SOLUTION INTRAVENOUS CONTINUOUS
Status: DISCONTINUED | OUTPATIENT
Start: 2022-10-06 | End: 2022-10-08 | Stop reason: HOSPADM

## 2022-10-06 RX ORDER — BETAMETHASONE SODIUM PHOSPHATE AND BETAMETHASONE ACETATE 3; 3 MG/ML; MG/ML
12 INJECTION, SUSPENSION INTRA-ARTICULAR; INTRALESIONAL; INTRAMUSCULAR; SOFT TISSUE EVERY 24 HOURS
Status: COMPLETED | OUTPATIENT
Start: 2022-10-06 | End: 2022-10-07

## 2022-10-06 RX ORDER — FENTANYL CITRATE 50 UG/ML
100 INJECTION, SOLUTION INTRAMUSCULAR; INTRAVENOUS
Status: DISCONTINUED | OUTPATIENT
Start: 2022-10-06 | End: 2022-10-08 | Stop reason: HOSPADM

## 2022-10-06 RX ADMIN — MAGNESIUM SULFATE IN WATER 2 G/HR: 40 INJECTION, SOLUTION INTRAVENOUS at 22:21

## 2022-10-06 RX ADMIN — NIFEDIPINE 20 MG: 10 CAPSULE ORAL at 11:29

## 2022-10-06 RX ADMIN — CEFAZOLIN SODIUM 2 G: 2 INJECTION, SOLUTION INTRAVENOUS at 13:53

## 2022-10-06 RX ADMIN — SODIUM CHLORIDE, POTASSIUM CHLORIDE, SODIUM LACTATE AND CALCIUM CHLORIDE 500 ML: 600; 310; 30; 20 INJECTION, SOLUTION INTRAVENOUS at 11:53

## 2022-10-06 RX ADMIN — SODIUM CHLORIDE, POTASSIUM CHLORIDE, SODIUM LACTATE AND CALCIUM CHLORIDE: 600; 310; 30; 20 INJECTION, SOLUTION INTRAVENOUS at 13:54

## 2022-10-06 RX ADMIN — FENTANYL CITRATE 100 MCG: 50 INJECTION, SOLUTION INTRAMUSCULAR; INTRAVENOUS at 13:52

## 2022-10-06 RX ADMIN — CEFAZOLIN SODIUM 2 G: 2 INJECTION, SOLUTION INTRAVENOUS at 20:01

## 2022-10-06 RX ADMIN — BETAMETHASONE SODIUM PHOSPHATE AND BETAMETHASONE ACETATE 12 MG: 3; 3 INJECTION, SUSPENSION INTRA-ARTICULAR; INTRALESIONAL; INTRAMUSCULAR at 11:02

## 2022-10-06 RX ADMIN — OXYCODONE HYDROCHLORIDE 5 MG: 5 TABLET ORAL at 17:03

## 2022-10-06 RX ADMIN — MAGNESIUM SULFATE HEPTAHYDRATE 4 G: 40 INJECTION, SOLUTION INTRAVENOUS at 11:42

## 2022-10-06 RX ADMIN — FENTANYL CITRATE 100 MCG: 50 INJECTION, SOLUTION INTRAMUSCULAR; INTRAVENOUS at 11:53

## 2022-10-06 RX ADMIN — MAGNESIUM SULFATE IN WATER 2 G/HR: 40 INJECTION, SOLUTION INTRAVENOUS at 12:12

## 2022-10-06 RX ADMIN — ACETAMINOPHEN 975 MG: 325 TABLET, FILM COATED ORAL at 16:40

## 2022-10-06 RX ADMIN — HYDROXYZINE HYDROCHLORIDE 100 MG: 25 TABLET, FILM COATED ORAL at 22:23

## 2022-10-06 ASSESSMENT — ACTIVITIES OF DAILY LIVING (ADL)
DIFFICULTY_COMMUNICATING: NO
DRESSING/BATHING_DIFFICULTY: NO
WALKING_OR_CLIMBING_STAIRS_DIFFICULTY: NO
ADLS_ACUITY_SCORE: 18
TOILETING_ISSUES: NO
WEAR_GLASSES_OR_BLIND: NO
ADLS_ACUITY_SCORE: 18
FALL_HISTORY_WITHIN_LAST_SIX_MONTHS: NO
ADLS_ACUITY_SCORE: 18
CHANGE_IN_FUNCTIONAL_STATUS_SINCE_ONSET_OF_CURRENT_ILLNESS/INJURY: NO
ADLS_ACUITY_SCORE: 18
DOING_ERRANDS_INDEPENDENTLY_DIFFICULTY: NO
ADLS_ACUITY_SCORE: 35
DIFFICULTY_EATING/SWALLOWING: NO
ADLS_ACUITY_SCORE: 18
ADLS_ACUITY_SCORE: 18
HEARING_DIFFICULTY_OR_DEAF: NO
CONCENTRATING,_REMEMBERING_OR_MAKING_DECISIONS_DIFFICULTY: NO

## 2022-10-06 NOTE — PROVIDER NOTIFICATION
10/06/22 1230   Provider Notification   Provider Name/Title Dr Kilgore   Method of Notification Electronic Page   Notification Reason Labor Status;Uterine Activity;Pain;Status Update

## 2022-10-06 NOTE — PROVIDER NOTIFICATION
Dr Kilgore called unit for update on pt. Orders received to decrease IV fluids back to 75ml/hr. Pt updated on POC.

## 2022-10-06 NOTE — TELEPHONE ENCOUNTER
32w1d    Pt calls with cxts today.     Cramping is painful. 5-10 minutes apart for the passed hour lasting 1-2 minutes. They have gotten more frequent and increased in strength since they started. I can hear her breathing through them on the phone.    Has not been noticing movements but has not been paying attention.     No watery leakage or bleeding.    [facility] said NICU closed so she has to go to [facility]. Paged on call Midwife Kenna     Pt on her way to [facility]. Kenna will speak to Riaz Montoya CNM for transfer      Susanne MAYER RN BSN

## 2022-10-06 NOTE — PLAN OF CARE
Pt arrived in discomfort.  [facility] is closed so was diverted here.  Placed on EFM/Keshena with consent.  VSS  FFN collected with explanation and consent  as was SVE.    Dr. Kilgore paged for further orders

## 2022-10-06 NOTE — CONSULTS
I was asked to provide antepartum counseling for Nicollette Armijos at the request of Dr. Kilgore because of prematurity  gestation at 32 1/7 weeks.     The counseling included: initial delivery room stabilization, respiratory course, lung development, respiratory management including surfactant administration, apnea and bradycardia of prematurity, IVH, at risk for infection, nutrition including initial IV fluids and transition to gavage feedings then breast or bottle feeding, growth and development, and long term outcomes.     Please feel free to call with any additional questions or concerns.    Floor Time (min): 10  Face to Face Time (min): 10  Total Time (minutes): 20  More than 50% of my time was spent in direct, face to face, antepartum counseling with the above patient.    Delaney Olmos, APRN- CNP, NNP 10/6/2022

## 2022-10-06 NOTE — TELEPHONE ENCOUNTER
Paged On call CNMONICA Montoya at [facility].  Informed 32w1d  patient already on her way to [place] as [facility] NICU is closed and patient was breathing through her contractions on the phone with the triage nurse.  Hx BV.  CNM voiced may need to have MD evaluate patient if in PTL, but accepting of patient and arranging further care as needed.    ARIAN Ortega CNM CLC  10/6/2022 9:35 AM

## 2022-10-06 NOTE — H&P
"  2022    Nicollette M Armijos  [id number]            OB Admit History & Physical      Ms. Soria  is here with painful contractions in divert from [facility].    She has noticed Painful regular contractions    Patient's last menstrual period was 2022 (within days).   Her Estimated Date of Delivery: 2022  , making her 32w1d  wks.      Estimated body mass index is 23.4 kg/m  as calculated from the following:    Height as of this encounter: 1.676 m (5' 6\").    Weight as of this encounter: 65.8 kg (145 lb).  Her prenatal course has been uncomplicated     See prenatal for labs.  unknown GBBS, Rubella  Immune, RH +    Estimated fetal weight= 3#       She is a 22 year old   Her OB history:   OB History    Para Term  AB Living   4 1 1 0 2 1   SAB IAB Ectopic Multiple Live Births   2 0 0 0 1      # Outcome Date GA Lbr Isidro/2nd Weight Sex Delivery Anes PTL Lv   4 Current            3 Term 10/21/21 39w2d 03:03 / 02:18 3.345 kg (7 lb 6 oz) M Vag-Spont EPI N ROBERT      Name: RAYMOND,MALE-NICOLLETTE      Apgar1: 9  Apgar5: 9   2 2014 4w0d          1 2014 6w0d    AB, COMPLETE               Past Medical History:   Diagnosis Date     Asthma     slight asthma     Constipation      Depressive disorder     depression and anxiety; pre-pregnancy on zoloft (nothing now)     Ovarian cyst, left      Urinary tract infection      Varicella     vaccinated          Past Surgical History:   Procedure Laterality Date     DILATION AND CURETTAGE SUCTION N/A 2014    Procedure: DILATION AND CURETTAGE SUCTION;  Surgeon: Petra Chase MD;  Location: [facility] OR     ESOPHAGOSCOPY, GASTROSCOPY, DUODENOSCOPY (EGD), COMBINED  2013    Procedure: COMBINED ESOPHAGOSCOPY, GASTROSCOPY, DUODENOSCOPY (EGD);  COLONOSCOPY & ESOPHAGOSCOPY, GASTROSCOPY, DUODENOSCOPY (EGD);  Surgeon: Esthela Regalado MD;  Location:  OR     INSERT INTRAUTERINE DEVICE N/A 2014    Procedure: INSERT INTRAUTERINE DEVICE;  " "Surgeon: Petra Chase MD;  Location: [facility] OR     SIGMOIDOSCOPY FLEXIBLE  11/14/2013    Procedure: SIGMOIDOSCOPY FLEXIBLE;;  Surgeon: Esthela Regalado MD;  Location: [facility] OR     TONSILLECTOMY  2017         No current outpatient medications on file.       Allergies: Penicillins      REVIEW OF SYSTEMS:  NEUROLOGIC:  Negative  EYES:  Negative  ENT:  Negative  GI:  Negative  BREAST:  Negative  :  Negative  GYN:  Negative  CV:  Negative  PULMONARY:  Negative  MUSCULOSKELETAL:  Negative  PSYCH:  Negative        Social History     Socioeconomic History     Marital status:      Spouse name: Jainism     Number of children: Not on file     Years of education: Not on file     Highest education level: Not on file   Occupational History     Occupation: dental asisstant   Tobacco Use     Smoking status: Never Smoker     Smokeless tobacco: Never Used   Vaping Use     Vaping Use: Never used   Substance and Sexual Activity     Alcohol use: No     Drug use: No     Sexual activity: Yes     Partners: Male   Other Topics Concern     Not on file   Social History Narrative     Not on file     Social Determinants of Health     Financial Resource Strain: Not on file   Food Insecurity: Not on file   Transportation Needs: Not on file   Physical Activity: Not on file   Stress: Not on file   Social Connections: Not on file   Intimate Partner Violence: Not on file   Housing Stability: Not on file      Family History   Problem Relation Age of Onset     No Known Problems Mother      Heart Disease Father      Depression Father      No Known Problems Brother      Ovarian Cancer Maternal Grandmother      Colon Cancer Maternal Grandmother      No Known Problems Paternal Grandmother      Diabetes Paternal Grandfather      Heart Disease Paternal Grandfather      Diabetes Brother              Vitals:   FHT reactive  With contractions every  2-3 min, initially. Now mild but every 2-6\"    Alert Awake in NAD  HEENT grossly normal  Neck: no " lymphadenopathy or thryoidomegaly  Lungs clear  Back no spinal tenderness, right CVAT   Heart RRR  ABD gravid, vertex on exam, non-tender  Pelvic:  no fluid noted, no blood noted  Cervix 2/80/-2, recheck showed minimal change  EXT:  trace edema, no calf tenderness  Neuro:  Intact, normal DTR    Assessment:  IUP at 32w1d     labor  +RSV, on respiratory precautions  Right flank pain, US negative for renal lithiasis. Suspect pyelonephritis    Plan:  Mag for neuroprotection x 12 hours  Betamethasone x 2  Ancef for presumed pyelo  May need Nifedipine after mag completed    [unfilled]      RICK NI MD MD  Dept of OB/GYN  2022

## 2022-10-06 NOTE — PROVIDER NOTIFICATION
Dr Kilgore notified via phone that pt is c/o constant right sided flank pain 8/10. She states she's having occasional ctx that the pain gets more severe with as well. UA results relayed and RSV results given to Dr Kilgore as well. New orders for a renal ultrasound, increase IV fluids to 200ml/hr, UC and IV ancef received. Pt updated on POC.

## 2022-10-06 NOTE — PLAN OF CARE
[id number]    MD updated  SVE recheck as pt is getting very uncomfortable  GBS, Wet prep, Covid, Labs and betamethasone were given.    MD re updated and pt moved to room 232 Ene SLATER will assume care.

## 2022-10-06 NOTE — PROVIDER NOTIFICATION
10/06/22 1125   Provider Notification   Provider Name/Title    Method of Notification Phone   Notification Reason Labor Status;Uterine Activity;Pain;SVE;Status Update

## 2022-10-07 PROBLEM — O60.00 PRETERM LABOR WITHOUT DELIVERY: Status: ACTIVE | Noted: 2022-10-07

## 2022-10-07 LAB — GP B STREP DNA SPEC QL NAA+PROBE: NEGATIVE

## 2022-10-07 PROCEDURE — 250N000011 HC RX IP 250 OP 636: Performed by: OBSTETRICS & GYNECOLOGY

## 2022-10-07 PROCEDURE — 258N000003 HC RX IP 258 OP 636: Performed by: SPECIALIST

## 2022-10-07 PROCEDURE — 258N000003 HC RX IP 258 OP 636: Performed by: OBSTETRICS & GYNECOLOGY

## 2022-10-07 PROCEDURE — 120N000001 HC R&B MED SURG/OB

## 2022-10-07 PROCEDURE — 250N000011 HC RX IP 250 OP 636: Performed by: SPECIALIST

## 2022-10-07 PROCEDURE — 250N000011 HC RX IP 250 OP 636

## 2022-10-07 PROCEDURE — 250N000013 HC RX MED GY IP 250 OP 250 PS 637: Performed by: SPECIALIST

## 2022-10-07 RX ORDER — TERBUTALINE SULFATE 1 MG/ML
INJECTION, SOLUTION SUBCUTANEOUS
Status: COMPLETED
Start: 2022-10-07 | End: 2022-10-07

## 2022-10-07 RX ORDER — TERBUTALINE SULFATE 1 MG/ML
0.25 INJECTION, SOLUTION SUBCUTANEOUS ONCE
Status: COMPLETED | OUTPATIENT
Start: 2022-10-07 | End: 2022-10-07

## 2022-10-07 RX ORDER — CEFTRIAXONE 1 G/1
1 INJECTION, POWDER, FOR SOLUTION INTRAMUSCULAR; INTRAVENOUS EVERY 24 HOURS
Status: DISCONTINUED | OUTPATIENT
Start: 2022-10-07 | End: 2022-10-08 | Stop reason: HOSPADM

## 2022-10-07 RX ADMIN — SODIUM CHLORIDE, POTASSIUM CHLORIDE, SODIUM LACTATE AND CALCIUM CHLORIDE 1000 ML: 600; 310; 30; 20 INJECTION, SOLUTION INTRAVENOUS at 08:31

## 2022-10-07 RX ADMIN — ACETAMINOPHEN 975 MG: 325 TABLET, FILM COATED ORAL at 00:05

## 2022-10-07 RX ADMIN — ACETAMINOPHEN 975 MG: 325 TABLET, FILM COATED ORAL at 08:43

## 2022-10-07 RX ADMIN — CEFAZOLIN SODIUM 2 G: 2 INJECTION, SOLUTION INTRAVENOUS at 08:44

## 2022-10-07 RX ADMIN — ACETAMINOPHEN 975 MG: 325 TABLET, FILM COATED ORAL at 16:41

## 2022-10-07 RX ADMIN — TERBUTALINE SULFATE 0.25 MG: 1 INJECTION SUBCUTANEOUS at 09:04

## 2022-10-07 RX ADMIN — CEFTRIAXONE SODIUM 1 G: 1 INJECTION, POWDER, FOR SOLUTION INTRAMUSCULAR; INTRAVENOUS at 14:37

## 2022-10-07 RX ADMIN — SODIUM CHLORIDE, POTASSIUM CHLORIDE, SODIUM LACTATE AND CALCIUM CHLORIDE: 600; 310; 30; 20 INJECTION, SOLUTION INTRAVENOUS at 00:05

## 2022-10-07 RX ADMIN — BETAMETHASONE SODIUM PHOSPHATE AND BETAMETHASONE ACETATE 12 MG: 3; 3 INJECTION, SUSPENSION INTRA-ARTICULAR; INTRALESIONAL; INTRAMUSCULAR at 11:06

## 2022-10-07 RX ADMIN — TERBUTALINE SULFATE 0.25 MG: 1 INJECTION, SOLUTION SUBCUTANEOUS at 09:04

## 2022-10-07 RX ADMIN — CEFAZOLIN SODIUM 2 G: 2 INJECTION, SOLUTION INTRAVENOUS at 02:02

## 2022-10-07 RX ADMIN — SODIUM CHLORIDE, POTASSIUM CHLORIDE, SODIUM LACTATE AND CALCIUM CHLORIDE: 600; 310; 30; 20 INJECTION, SOLUTION INTRAVENOUS at 12:00

## 2022-10-07 RX ADMIN — SODIUM CHLORIDE, POTASSIUM CHLORIDE, SODIUM LACTATE AND CALCIUM CHLORIDE: 600; 310; 30; 20 INJECTION, SOLUTION INTRAVENOUS at 15:41

## 2022-10-07 ASSESSMENT — ACTIVITIES OF DAILY LIVING (ADL)
ADLS_ACUITY_SCORE: 18

## 2022-10-07 NOTE — PROVIDER NOTIFICATION
10/06/22 2015   Provider Notification   Provider Name/Title Dr Kilgore   Method of Notification Phone   Notification Reason Status Update;Medication Request     Status update to Dr Kilgore, pt comfortable and denying pain. Request prn for sleep; also orders for procardia PO PRN for contractions after magnesium off at 0000.

## 2022-10-07 NOTE — PROVIDER NOTIFICATION
10/07/22 0848   Provider Notification   Provider Name/Title Dr. Allison   Method of Notification Phone   Request Evaluate - Remote   Notification Reason Pain;Status Update  (Provider notified that pt states her contraction pain has worsened from 2/10 - 4/10 on pain scale in the last 20 mins. T.O.R.B. received to perform SVE & SQ Terbutaline.)

## 2022-10-07 NOTE — PROVIDER NOTIFICATION
"   10/07/22 0828   Provider Notification   Provider Name/Title Dr. Allison   Method of Notification At Bedside   Notification Reason Uterine Activity;Pain;Status Update  (Provider to bedside for pt status update, to review EFM & contraction tracing, & to discuss plans for cares for today. MYRA.INES.R.B. received for IV fluid bolus & MFM consult.)     Bedside US performed by provider at this time to confirm fetal position. Pt expresses concern at this time regarding her concentrated urine color (dark allie), as well as continued intermittent contractions & right flank pain. Pt states when she begins to contract more regularly that her right flank pain \"flares up\". Pt currently rating her contraction pain at 2/10 on pain scale.  Provider ordered 1000 mL LR IV fluid bolus & MFM consult, as pts BP too low to give Nifedipine to decrease contractions.   Pt verbalizes understanding & agreement with this plan.   "

## 2022-10-07 NOTE — PLAN OF CARE
Pt reports that ctx have subsided. Tylenol and oxycodone adequate for right flank  pain relief. RSV symptoms reported as a mild cold. Pt up to BR independently. Baby active.

## 2022-10-07 NOTE — PROGRESS NOTES
"Antepartum Progress Note    32 2/7 wks   labor  RSV  Suspected pyelonephritis        S:  Lorrie reports feeling similar to how she did yesterday before coming to hospital.  Denies painful contractions like she had after things evolved here at hospital.   Reports last pregnancy was 1 cm dilated/980% effaced at approx 36 weeks, induced at 39 wks.  Care with CNM group, plans delivery at [facility] (diverted).   States urine appears concentrated when voided.   Reviewed w/ RN that received 500 ml bolus IV fluids yesterday when arrived/admitted.       O: /63   Temp 98.7  F (37.1  C) (Temporal)   Resp 14   Ht 1.676 m (5' 6\")   Wt 65.8 kg (145 lb)   LMP 2022 (Within Days)   BMI 23.40 kg/m      NST - brief amount of tracing available when I evaluated pt, currently on monitor, reactive, moderate variability, no decels  TOCO - + irritability noted    Labs:  WBC 10.5 (normal) yesterday  Hgb 10.5    Bedside ultrasound confirms vertex presentation        A:  21 yo  at 32 2/7 weeks admitted with  labor.  RSV.  Suspected pyelonephritis.    P:  S/p magnesium sulfate for neuroprotection.  Receiving BMZ course.  Unable to start oral nifedpine at this time due to BP trending low.  Will give IV fluid bolus now, continue oral hydration + IV fluids.  Continue ancef, urine culture pending.  RSV isolation precautions.  Request M consultation.  If eventually able to be discharged, d/w pt that recommend she establish care with OB MD group, as she will need close outpatient follow up care.      Carie Allison MD  [facility]  10/7/22    Addendum:  Reviewed case with M provider (Dr. SRINIVASA Phillips).  Recommendations include ceftriaxone, expectant mgmt w/  labor precautions.  Carie Allison MD    "

## 2022-10-07 NOTE — PLAN OF CARE
"Lorrie was able to sleep comfortably throughout my shift. Magnesium sulfate was discontinued per orders. Some occasional contractions with irritability was noted starting around 0200. Pt states she notices some cramping, but states it is barely painful, \"just noticeable.\" FHR cat 1 other than one late decel noted, pt was noted to be laying on her back at this time. No PRN pain medicine outside of scheduled tylenol. VSS.   "

## 2022-10-07 NOTE — PROGRESS NOTES
Mom currently positive for RSV. Mom does have symptoms. If mom delivers and baby needs NICU, mom will need symptoms to resolve before going to NICU. When mom visits, mom must adhere to hand hygiene and masking.

## 2022-10-07 NOTE — PROVIDER NOTIFICATION
10/07/22 1048   Provider Notification   Provider Name/Title Dr. Allison   Method of Notification Phone   Notification Reason Uterine Activity;Pain;Status Update;SVE  (Provider given pt status update, notified of unchanged SVE, & that both frequency & intensity of uterine contractions have decreased.)

## 2022-10-08 ENCOUNTER — HOSPITAL ENCOUNTER (INPATIENT)
Facility: CLINIC | Age: 22
End: 2022-10-08
Admitting: SPECIALIST
Payer: COMMERCIAL

## 2022-10-08 VITALS
TEMPERATURE: 97.9 F | SYSTOLIC BLOOD PRESSURE: 95 MMHG | WEIGHT: 145 LBS | RESPIRATION RATE: 16 BRPM | BODY MASS INDEX: 23.3 KG/M2 | DIASTOLIC BLOOD PRESSURE: 54 MMHG | HEIGHT: 66 IN

## 2022-10-08 LAB — BACTERIA UR CULT: NO GROWTH

## 2022-10-08 PROCEDURE — 250N000013 HC RX MED GY IP 250 OP 250 PS 637: Performed by: SPECIALIST

## 2022-10-08 PROCEDURE — 258N000003 HC RX IP 258 OP 636: Performed by: SPECIALIST

## 2022-10-08 RX ADMIN — SODIUM CHLORIDE, POTASSIUM CHLORIDE, SODIUM LACTATE AND CALCIUM CHLORIDE: 600; 310; 30; 20 INJECTION, SOLUTION INTRAVENOUS at 00:11

## 2022-10-08 RX ADMIN — ACETAMINOPHEN 975 MG: 325 TABLET, FILM COATED ORAL at 00:10

## 2022-10-08 ASSESSMENT — ACTIVITIES OF DAILY LIVING (ADL)
ADLS_ACUITY_SCORE: 18

## 2022-10-08 NOTE — PLAN OF CARE
Report received. Care taken over. Pt desires early discharge. Discharge instructions given and reviewed. Questions answered. Reviewed when to call and return. Support given. Await ride.

## 2022-10-08 NOTE — PLAN OF CARE
Patient slept well overnight and reports no pain or contractions overnight. MD rounded on patient this morning and discussed plan of care with patient. Discharge orders received.

## 2022-10-08 NOTE — PLAN OF CARE
Report received from Marybeth at 1915 and care resumed at that time. Patient reports feeling more right sided flank pain and describes it as constant, rating pain 5/10. Patient denies feeling any ctx at this time and denies any pain meds at this time. Patient reports she is going to order food and appears to be resting comfortably in bed.

## 2022-10-08 NOTE — PLAN OF CARE
VSS this shift.  Pt has reported intermittent right flank pain this shift that is mild (1-2/10 on pain scale). Pt has been getting PO Tylenol Q 8 hrs & reports this controls her pain well & pt has declined any other offered pain meds this shift.  Pt was licha regularly this AM & was given subcutaneous Terbutaline, as BP was too low to administer PO Nifedipine, & was given a 1000 mL IV fluid bolus, & contractions slowed down following this & have since been very infrequent & of minimal intensity.   SVE is unchanged since 1100 yesterday (2/80/-1).  Pt has continued on IV antibiotics for UTI/pyelonephritis.   Category 1 fetal tracing this shift.  Pt ambulating independently & showered this shift without difficulty.

## 2022-10-08 NOTE — PROGRESS NOTES
"Antepartum Progress Note    32 3/7 wks   labor  RSV        S:  Lorrie reports feeling much better.  Slept overnight.  Denies feeling contractions at this time.    Wants go to home.   Initiated care with OB MD group, has first appt there 10/11/22.   Plans delivery at [facility].      O: BP 95/54   Temp 97.9  F (36.6  C) (Temporal)   Resp 16   Ht 1.676 m (5' 6\")   Wt 65.8 kg (145 lb)   LMP 2022 (Within Days)   BMI 23.40 kg/m      NST/TOCO  - currently on monitor but no paper tracing, RN states reassuring tracing overnight, and rare contractions overnight          A:  23 yo  at 32 3/7 weeks admitted with  labor.  RSV.  Urine culture negative  (pyelonephritis suspected on admission).    P:  S/p magnesium sulfate for neuroprotection.  S/P  BMZ course.  S/P IV fluids.  discharge to home today, f/u at OB clinic as scheduled on 10/11, knows to seek care if develops recurrent PTL sx.  Modified bedrest.    Carie Allison MD  [facility]  10/8/22        "

## 2022-10-08 NOTE — DISCHARGE INSTRUCTIONS
Discharge Instruction for Undelivered Patients      You were seen for: Labor Assessment  We Consulted: Dr. Allison  You had (Test or Medicine):Fetal Monitoring, Betamethasone injections x2, Magnesium Sulfate for neuro protection,  fetal fibronectin, Urinalysis, Urine culture, GBS culture, antibiotics    Diet:   Drink 8 to 12 glasses of liquids (milk, juice, water) every day.  You may eat meals and snacks.     Activity:  Call your doctor or nurse midwife if your baby is moving less than usual.     Call your provider if you notice:  Swelling in your face or increased swelling in your hands or legs.  Headaches that are not relieved by Tylenol (acetaminophen).  Changes in your vision (blurring: seeing spots or stars.)  Nausea (sick to your stomach) and vomiting (throwing up).   Weight gain of 5 pounds or more per week.  Heartburn that doesn't go away.  Signs of bladder infection: pain when you urinate (use the toilet), need to go more often and more urgently.  The bag of hernandez (rupture of membranes) breaks, or you notice leaking in your underwear.  Bright red blood in your underwear.  Abdominal (lower belly) or stomach pain.  For first baby: Contractions (tightening) less than 5 minutes apart for one hour or more.  Second (plus) baby: Contractions (tightening) less than 10 minutes apart and getting stronger.  *If less than 34 weeks: Contractions (tightening) more than 6 times in one hour.  Increase or change in vaginal discharge (note the color and amount)  Other:     Follow-up:  As scheduled in the clinic  Keep your scheduled appointment Tuesday 10/11/22 with your OB provider

## 2022-10-11 ENCOUNTER — PRENATAL OFFICE VISIT (OUTPATIENT)
Dept: OBGYN | Facility: CLINIC | Age: 22
End: 2022-10-11
Payer: COMMERCIAL

## 2022-10-11 VITALS — SYSTOLIC BLOOD PRESSURE: 102 MMHG | DIASTOLIC BLOOD PRESSURE: 64 MMHG | BODY MASS INDEX: 23.4 KG/M2 | WEIGHT: 145 LBS

## 2022-10-11 DIAGNOSIS — O43.113 CIRCUMVALLATE PLACENTA DURING PREGNANCY IN THIRD TRIMESTER, ANTEPARTUM: ICD-10-CM

## 2022-10-11 DIAGNOSIS — O60.00 PRETERM LABOR WITHOUT DELIVERY: Primary | ICD-10-CM

## 2022-10-11 DIAGNOSIS — J20.5 RSV BRONCHITIS: ICD-10-CM

## 2022-10-11 PROBLEM — O99.340 ANXIETY DURING PREGNANCY: Status: RESOLVED | Noted: 2021-04-19 | Resolved: 2022-10-11

## 2022-10-11 PROBLEM — O99.891 BACK PAIN AFFECTING PREGNANCY IN THIRD TRIMESTER: Status: RESOLVED | Noted: 2021-09-14 | Resolved: 2022-10-11

## 2022-10-11 PROBLEM — F41.9 ANXIETY DURING PREGNANCY: Status: RESOLVED | Noted: 2021-04-19 | Resolved: 2022-10-11

## 2022-10-11 PROBLEM — M54.9 BACK PAIN AFFECTING PREGNANCY IN THIRD TRIMESTER: Status: RESOLVED | Noted: 2021-09-14 | Resolved: 2022-10-11

## 2022-10-11 PROBLEM — Z36.89 ENCOUNTER FOR TRIAGE IN PREGNANT PATIENT: Status: RESOLVED | Noted: 2021-09-13 | Resolved: 2022-10-11

## 2022-10-11 PROBLEM — O21.9 NAUSEA/VOMITING IN PREGNANCY: Status: RESOLVED | Noted: 2021-04-26 | Resolved: 2022-10-11

## 2022-10-11 PROCEDURE — 99207 PR COMPLICATED OB VISIT: CPT | Performed by: OBSTETRICS & GYNECOLOGY

## 2022-10-11 NOTE — PROGRESS NOTES
Originally CNM Pt who was in [facility] 10/6- for PTL possibly brought on by RSV infection.  Pt also thought to have pyelo but Ur C&S was neg.  Pt was given BMZ x 2 doses and Rx'd with Mg sulfate for neuroprotection and 1 dose of Terb (due to baseline hypotension they avoided Nifedipine).  Pt also got IVFs.  UC's eventually stopped.  Her Cx 2/80/-2 on admission on 10/6.  Pt states was doing well but in the last 2 hours has had some increase in UCs, Q 4 min, for the last 2 hours.  Cx-1/80/-2/Vtx (which is stable if not less dilated than back on 10/6).  She also had neg FFN on 10/6.  Therefore I do believe these are B-H ctx's.  Pt will monitor for now.  Fetal movement counts BID,  labor/premature rupture of membranes precautions reviewed.  RTC 1 week(s).  Pt advised by both CNM service and me that she should transfer care to OB MD service going forward with her recent Hx.    Encounter Diagnoses   Name Primary?     Circumvallate placenta during pregnancy in third trimester, antepartum Yes      labor without delivery      RSV bronchitis        Risk factors listed above are stable and being addressed as noted.    Phil Land MD  [facility]

## 2022-10-11 NOTE — NURSING NOTE
"Chief Complaint   Patient presents with     Prenatal Care     32 weeks 6 days- contractions for the last 2 hours        Initial /64 (BP Location: Right arm, Patient Position: Sitting, Cuff Size: Adult Regular)   Wt 65.8 kg (145 lb)   LMP 2022 (Within Days)   BMI 23.40 kg/m   Estimated body mass index is 23.4 kg/m  as calculated from the following:    Height as of 10/6/22: 1.676 m (5' 6\").    Weight as of this encounter: 65.8 kg (145 lb).  BP completed using cuff size: regular    Questioned patient about current smoking habits.  Pt. has never smoked.          The following HM Due: NONE    32w6d  Shiraz Angulo CMA                "

## 2022-10-17 ENCOUNTER — PRENATAL OFFICE VISIT (OUTPATIENT)
Dept: OBGYN | Facility: CLINIC | Age: 22
End: 2022-10-17
Payer: COMMERCIAL

## 2022-10-17 VITALS — WEIGHT: 148 LBS | DIASTOLIC BLOOD PRESSURE: 62 MMHG | BODY MASS INDEX: 23.89 KG/M2 | SYSTOLIC BLOOD PRESSURE: 102 MMHG

## 2022-10-17 DIAGNOSIS — Z87.59 H/O MIGRAINE DURING PREGNANCY: ICD-10-CM

## 2022-10-17 DIAGNOSIS — O43.113 CIRCUMVALLATE PLACENTA DURING PREGNANCY IN THIRD TRIMESTER, ANTEPARTUM: Primary | ICD-10-CM

## 2022-10-17 DIAGNOSIS — Z86.69 H/O MIGRAINE DURING PREGNANCY: ICD-10-CM

## 2022-10-17 DIAGNOSIS — O60.00 PRETERM LABOR WITHOUT DELIVERY: ICD-10-CM

## 2022-10-17 PROBLEM — Z34.83 ENCOUNTER FOR SUPERVISION OF OTHER NORMAL PREGNANCY IN THIRD TRIMESTER: Status: RESOLVED | Noted: 2022-09-12 | Resolved: 2022-10-17

## 2022-10-17 PROCEDURE — 99207 PR COMPLICATED OB VISIT: CPT | Performed by: OBSTETRICS & GYNECOLOGY

## 2022-10-17 RX ORDER — SUMATRIPTAN 25 MG/1
25 TABLET, FILM COATED ORAL
Qty: 8 TABLET | Refills: 0 | Status: SHIPPED | OUTPATIENT
Start: 2022-10-17 | End: 2023-11-01

## 2022-10-17 NOTE — PROGRESS NOTES
No OB c/o's.  No UC's, VB, SROM.  Fetus active.  Pt does have a persistent migraine for several days, c/w prior migraines she had pre-preg.  Has tried caffeine and Tylenol without improvement.  Rx Imitrex sent.  Fetal movement counts BID,  labor/premature rupture of membranes precautions reviewed.  RTC 1 week(s).    Encounter Diagnoses   Name Primary?     Circumvallate placenta during pregnancy in third trimester, antepartum Yes      labor without delivery      H/O migraine during pregnancy        Risk factors listed above are stable and being addressed as noted.    Phil Land MD  [facility] [place]

## 2022-10-17 NOTE — NURSING NOTE
"Chief Complaint   Patient presents with     Prenatal Care     33 weeks- Migraine x11 days       Initial /62 (BP Location: Right arm, Patient Position: Sitting, Cuff Size: Adult Regular)   Wt 67.1 kg (148 lb)   LMP 2022 (Within Days)   BMI 23.89 kg/m   Estimated body mass index is 23.89 kg/m  as calculated from the following:    Height as of 10/6/22: 1.676 m (5' 6\").    Weight as of this encounter: 67.1 kg (148 lb).  BP completed using cuff size: regular    Questioned patient about current smoking habits.  Pt. has never smoked.          The following HM Due: NONE    33w5d  Shiraz Angulo CMA                "

## 2022-10-24 ENCOUNTER — PRENATAL OFFICE VISIT (OUTPATIENT)
Dept: OBGYN | Facility: CLINIC | Age: 22
End: 2022-10-24
Payer: COMMERCIAL

## 2022-10-24 VITALS — DIASTOLIC BLOOD PRESSURE: 58 MMHG | WEIGHT: 148 LBS | SYSTOLIC BLOOD PRESSURE: 102 MMHG | BODY MASS INDEX: 23.89 KG/M2

## 2022-10-24 DIAGNOSIS — Z23 NEED FOR PROPHYLACTIC VACCINATION AND INOCULATION AGAINST INFLUENZA: ICD-10-CM

## 2022-10-24 DIAGNOSIS — O43.113 CIRCUMVALLATE PLACENTA DURING PREGNANCY IN THIRD TRIMESTER, ANTEPARTUM: Primary | ICD-10-CM

## 2022-10-24 DIAGNOSIS — O60.00 PRETERM LABOR WITHOUT DELIVERY: ICD-10-CM

## 2022-10-24 PROCEDURE — 90686 IIV4 VACC NO PRSV 0.5 ML IM: CPT | Performed by: OBSTETRICS & GYNECOLOGY

## 2022-10-24 PROCEDURE — 90471 IMMUNIZATION ADMIN: CPT | Performed by: OBSTETRICS & GYNECOLOGY

## 2022-10-24 PROCEDURE — 99207 PR COMPLICATED OB VISIT: CPT | Performed by: OBSTETRICS & GYNECOLOGY

## 2022-10-24 NOTE — DISCHARGE SUMMARY
[facility] Discharge Summary    Nicollette M Armijos MRN# [id number]   Age: 22 year old YOB: 2000     Date of Admission:  10/6/2022  Date of Discharge::  10/8/22  Admitting Physician:  Tereza Kilgore MD  Discharge Physician:  Carie Allison MD     Home clinic: Midwife group at [facility]             Admission Diagnoses:    labor  RSV  Flank pain          Discharge Diagnosis:    labor  RSV              Medications Prior to Admission:     No medications prior to admission.             Discharge Medications:     Discharge Medication List as of 10/8/2022  7:49 AM      CONTINUE these medications which have NOT CHANGED    Details   docusate sodium (COLACE) 100 MG capsule Take 100 mg by mouth daily, Historical      ondansetron (ZOFRAN ODT) 4 MG ODT tab Take 1 tablet (4 mg) by mouth every 8 hours as needed for nausea, Disp-15 tablet, R-1, E-Prescribe      Prenatal Vit-Fe Fumarate-FA (PRENATAL VITAMIN) 27-0.8 MG TABS Take 1 tablet by mouth daily, Historical                      Hospital Course:   Admitted at 32 1/7 wks gestation with regular, painful contractions, cervix 2 cm/80%/-2.  Also dx with RSV, and presented with flank pain.  Received IV magnesium sulfate, betamethasone, ancef, then ceftriaxone.    Stabilized.   Unable to start nifedipine due to low BP values.   Urine culture negative, antibiotics stopped.    Discharged with instructions to follow up with OB provider affiliated with her planned hospital ([facility]) for ongoing  labor f/u.   Modified bedrest    .       :                           Discharge Disposition:   Discharged to home      Attestation:  I have reviewed today's vital signs, notes, medications, labs and imaging.    Carie Allison MD   10/24/22

## 2022-10-24 NOTE — PROGRESS NOTES
No c/o's.  Migraine has resolved after Imitrex.  Flu shot.  Fetal movement counts BID,  labor/premature rupture of membranes precautions reviewed.  RTC 2 week(s).  GBS at next visit.    Encounter Diagnoses   Name Primary?     Circumvallate placenta during pregnancy in third trimester, antepartum Yes      labor without delivery        Risk factors listed above are stable and being addressed as noted.    Phil Land MD  [facility] [place]

## 2022-10-26 ENCOUNTER — HOSPITAL ENCOUNTER (OUTPATIENT)
Facility: CLINIC | Age: 22
Discharge: HOME | End: 2022-10-26
Attending: OBSTETRICS & GYNECOLOGY | Admitting: OBSTETRICS & GYNECOLOGY
Payer: COMMERCIAL

## 2022-10-26 ENCOUNTER — HOSPITAL ENCOUNTER (OUTPATIENT)
Facility: CLINIC | Age: 22
End: 2022-10-26
Admitting: OBSTETRICS & GYNECOLOGY
Payer: COMMERCIAL

## 2022-10-26 ENCOUNTER — TELEPHONE (OUTPATIENT)
Dept: OBGYN | Facility: CLINIC | Age: 22
End: 2022-10-26

## 2022-10-26 VITALS
RESPIRATION RATE: 16 BRPM | DIASTOLIC BLOOD PRESSURE: 59 MMHG | HEART RATE: 83 BPM | BODY MASS INDEX: 23.78 KG/M2 | SYSTOLIC BLOOD PRESSURE: 104 MMHG | HEIGHT: 66 IN | TEMPERATURE: 97.8 F | WEIGHT: 148 LBS

## 2022-10-26 PROCEDURE — 59025 FETAL NON-STRESS TEST: CPT

## 2022-10-26 PROCEDURE — 258N000003 HC RX IP 258 OP 636: Performed by: OBSTETRICS & GYNECOLOGY

## 2022-10-26 PROCEDURE — G0463 HOSPITAL OUTPT CLINIC VISIT: HCPCS | Mod: 25

## 2022-10-26 PROCEDURE — 96360 HYDRATION IV INFUSION INIT: CPT

## 2022-10-26 RX ORDER — LIDOCAINE 40 MG/G
CREAM TOPICAL
Status: DISCONTINUED | OUTPATIENT
Start: 2022-10-26 | End: 2022-10-26 | Stop reason: HOSPADM

## 2022-10-26 RX ADMIN — SODIUM CHLORIDE, POTASSIUM CHLORIDE, SODIUM LACTATE AND CALCIUM CHLORIDE 1000 ML: 600; 310; 30; 20 INJECTION, SOLUTION INTRAVENOUS at 18:34

## 2022-10-26 ASSESSMENT — ACTIVITIES OF DAILY LIVING (ADL)
ADLS_ACUITY_SCORE: 18
ADLS_ACUITY_SCORE: 18

## 2022-10-26 NOTE — TELEPHONE ENCOUNTER
I spoke w/ Dr. Valentine Price of [facility] who will accept Pt for eval at [facility].

## 2022-10-26 NOTE — TELEPHONE ENCOUNTER
Pt 35 weeks.  Calling with cramping/back pain since yesterday.  Now has had 3 ctx over the last 7 minutes, having to breathe through them.  Denies LOF or VB.  Normal FM today.    Denies urinary sx, denies vaginal sx.    Lost mucous plug over the past couple of weeks.    Was in for PTL on 10/6.  Dilated to 2cm.    [facility] closed.  [facility] is next closest, need provider to provider report per Triage at [facility].    Tatyana Diaz RN

## 2022-10-26 NOTE — PROVIDER NOTIFICATION
10/26/22 1620   Provider Notification   Provider Name/Title Dr. Price   Method of Notification In Department   Request Evaluate - Remote   Notification Reason SVE;Uterine Activity;Labor Status;Pain   Dr. Price updated in department of patient's arrival, SVE, contraction pattern, FHT's. Will plan to watch patient for 2 hours and recheck SVE at that time.

## 2022-10-27 NOTE — DISCHARGE INSTRUCTIONS
Discharge Instruction for Undelivered Patients      You were seen for: Labor Assessment  We Consulted: Dr. Price  You had (Test or Medicine): fetal and uterine monitoring, cervical exam     Diet:   Regular as tolerated, stay hydrated     Activity:  Count fetal kicks everyday (see handout)  Call your doctor or nurse midwife if your baby is moving less than usual.     Call your provider if you notice:  Swelling in your face or increased swelling in your hands or legs.  Headaches that are not relieved by Tylenol (acetaminophen).  Changes in your vision (blurring: seeing spots or stars.)  Nausea (sick to your stomach) and vomiting (throwing up).   Weight gain of 5 pounds or more per week.  Heartburn that doesn't go away.  Signs of bladder infection: pain when you urinate (use the toilet), need to go more often and more urgently.  The bag of hernandez (rupture of membranes) breaks, or you notice leaking in your underwear.  Bright red blood in your underwear.  Abdominal (lower belly) or stomach pain.  Second (plus) baby: Contractions (tightening) less than 10 minutes apart and getting stronger.  Increase or change in vaginal discharge (note the color and amount)  Other:     Follow-up:  As scheduled in the clinic

## 2022-10-27 NOTE — PLAN OF CARE
After SVE recheck, cervix unchanged but patient still licha q 2 minutes and breathing through them. Contractions palpated moderate. FHT's reactive. Received an order from Dr. Price for a L bolus, patient in agreement with plan.     After L of fluid, contractions had spaced out and patient stated they were slightly less intense. SVE still unchanged from admission more than 3 hours ago. Received orders from Dr. Price to discharge patient to home with labor precautions and plans to follow up in clinic with her primary.    Reviewed discharge instructions with patient and her  and both were in agreement with plan of care and verbalized understanding of instructions. Patient left ambulatory at 1933.

## 2022-10-31 ENCOUNTER — PRENATAL OFFICE VISIT (OUTPATIENT)
Dept: OBGYN | Facility: CLINIC | Age: 22
End: 2022-10-31
Payer: COMMERCIAL

## 2022-10-31 VITALS — WEIGHT: 149.9 LBS | BODY MASS INDEX: 24.19 KG/M2 | SYSTOLIC BLOOD PRESSURE: 100 MMHG | DIASTOLIC BLOOD PRESSURE: 68 MMHG

## 2022-10-31 DIAGNOSIS — O43.113 CIRCUMVALLATE PLACENTA DURING PREGNANCY IN THIRD TRIMESTER, ANTEPARTUM: Primary | ICD-10-CM

## 2022-10-31 DIAGNOSIS — O60.00 PRETERM LABOR WITHOUT DELIVERY: ICD-10-CM

## 2022-10-31 PROCEDURE — 87653 STREP B DNA AMP PROBE: CPT | Performed by: OBSTETRICS & GYNECOLOGY

## 2022-10-31 PROCEDURE — 99207 PR COMPLICATED OB VISIT: CPT | Performed by: OBSTETRICS & GYNECOLOGY

## 2022-10-31 NOTE — PROGRESS NOTES
No c/o's.  Has f/u U/S 2022 w/ MFM for Circumvallate placenta.  Was r/o'd for PTL at [facility] last week. Cx 2 cm at that time.  GBS done today.  Cx-2/80/-2/Post/Vtx.  Fetal movement counts BID,  labor/premature rupture of membranes precautions reviewed.  RTC 1 week(s).    Encounter Diagnoses   Name Primary?     Circumvallate placenta during pregnancy in third trimester, antepartum Yes      labor without delivery        Risk factors listed above are stable and being addressed as noted.    Phil Land MD  [facility] [place]

## 2022-10-31 NOTE — NURSING NOTE
"Chief Complaint   Patient presents with     Prenatal Care     35 weeks 5 days         Initial /68 (BP Location: Left arm, Cuff Size: Adult Regular)   Wt 68 kg (149 lb 14.4 oz)   LMP 2022 (Within Days)   BMI 24.19 kg/m   Estimated body mass index is 24.19 kg/m  as calculated from the following:    Height as of 10/26/22: 1.676 m (5' 6\").    Weight as of this encounter: 68 kg (149 lb 14.4 oz).  BP completed using cuff size: regular    Questioned patient about current smoking habits.  Pt. has never smoked.    35w5d      The following HM Due: NONE        +FM daily   +GBS today       Laurel Montoya, CMA on 10/31/2022 at 11:24 AM         "

## 2022-11-01 LAB — GP B STREP DNA SPEC QL NAA+PROBE: NEGATIVE

## 2022-11-07 ENCOUNTER — PRENATAL OFFICE VISIT (OUTPATIENT)
Dept: OBGYN | Facility: CLINIC | Age: 22
End: 2022-11-07
Payer: COMMERCIAL

## 2022-11-07 ENCOUNTER — HOSPITAL ENCOUNTER (OUTPATIENT)
Dept: ULTRASOUND IMAGING | Facility: CLINIC | Age: 22
Discharge: HOME | End: 2022-11-07
Attending: OBSTETRICS & GYNECOLOGY
Payer: COMMERCIAL

## 2022-11-07 ENCOUNTER — OFFICE VISIT (OUTPATIENT)
Dept: MATERNAL FETAL MEDICINE | Facility: CLINIC | Age: 22
End: 2022-11-07
Attending: OBSTETRICS & GYNECOLOGY
Payer: COMMERCIAL

## 2022-11-07 VITALS — WEIGHT: 152 LBS | SYSTOLIC BLOOD PRESSURE: 112 MMHG | BODY MASS INDEX: 24.53 KG/M2 | DIASTOLIC BLOOD PRESSURE: 62 MMHG

## 2022-11-07 DIAGNOSIS — O43.113 CIRCUMVALLATE PLACENTA DURING PREGNANCY IN THIRD TRIMESTER, ANTEPARTUM: Primary | ICD-10-CM

## 2022-11-07 DIAGNOSIS — O43.113 CIRCUMVALLATE PLACENTA IN THIRD TRIMESTER: Primary | ICD-10-CM

## 2022-11-07 DIAGNOSIS — O43.113 CIRCUMVALLATE PLACENTA IN THIRD TRIMESTER: ICD-10-CM

## 2022-11-07 DIAGNOSIS — O60.00 PRETERM LABOR WITHOUT DELIVERY: ICD-10-CM

## 2022-11-07 PROCEDURE — 99207 PR COMPLICATED OB VISIT: CPT | Performed by: OBSTETRICS & GYNECOLOGY

## 2022-11-07 PROCEDURE — 76816 OB US FOLLOW-UP PER FETUS: CPT | Mod: 26 | Performed by: OBSTETRICS & GYNECOLOGY

## 2022-11-07 PROCEDURE — 76816 OB US FOLLOW-UP PER FETUS: CPT

## 2022-11-07 NOTE — PROGRESS NOTES
No c/o's.  MFM U/S today showed 44%, Cephalic.  Fetal movement counts BID,  labor/premature rupture of membranes precautions reviewed.  RTC 1 week(s).    Encounter Diagnoses   Name Primary?     Circumvallate placenta during pregnancy in third trimester, antepartum Yes      labor without delivery        Risk factors listed above are stable and being addressed as noted.    Phil Land MD  [facility] [place]

## 2022-11-07 NOTE — PROGRESS NOTES
"Please see \"Imaging\" tab under \"Chart Review\" for details of today's US.    Winnie Park, DO    "

## 2022-11-07 NOTE — NURSING NOTE
"Chief Complaint   Patient presents with     Prenatal Care     36 weeks 5 days- back pain, cramping & SOB        Initial /62 (BP Location: Right arm, Patient Position: Sitting, Cuff Size: Adult Regular)   Wt 68.9 kg (152 lb)   LMP 2022 (Within Days)   BMI 24.53 kg/m   Estimated body mass index is 24.53 kg/m  as calculated from the following:    Height as of 10/26/22: 1.676 m (5' 6\").    Weight as of this encounter: 68.9 kg (152 lb).  BP completed using cuff size: regular    Questioned patient about current smoking habits.  Pt. has never smoked.          The following HM Due: NONE    36w5d  Shiraz Angulo CMA                "

## 2022-11-09 ENCOUNTER — HOSPITAL ENCOUNTER (INPATIENT)
Facility: CLINIC | Age: 22
LOS: 2 days | Discharge: HOME HEALTH | End: 2022-11-11
Attending: OBSTETRICS & GYNECOLOGY | Admitting: OBSTETRICS & GYNECOLOGY
Payer: COMMERCIAL

## 2022-11-09 ENCOUNTER — ANESTHESIA EVENT (OUTPATIENT)
Dept: OBGYN | Facility: CLINIC | Age: 22
End: 2022-11-09
Payer: COMMERCIAL

## 2022-11-09 ENCOUNTER — NURSE TRIAGE (OUTPATIENT)
Dept: NURSING | Facility: CLINIC | Age: 22
End: 2022-11-09

## 2022-11-09 ENCOUNTER — ANESTHESIA (OUTPATIENT)
Dept: OBGYN | Facility: CLINIC | Age: 22
End: 2022-11-09
Payer: COMMERCIAL

## 2022-11-09 LAB
ABO/RH(D): NORMAL
ANTIBODY SCREEN: NEGATIVE
ERYTHROCYTE [DISTWIDTH] IN BLOOD BY AUTOMATED COUNT: 13.4 % (ref 10–15)
HCT VFR BLD AUTO: 34.2 % (ref 35–47)
HGB BLD-MCNC: 10.6 G/DL (ref 11.7–15.7)
MCH RBC QN AUTO: 27.7 PG (ref 26.5–33)
MCHC RBC AUTO-ENTMCNC: 31 G/DL (ref 31.5–36.5)
MCV RBC AUTO: 89 FL (ref 78–100)
PLATELET # BLD AUTO: 243 10E3/UL (ref 150–450)
RBC # BLD AUTO: 3.83 10E6/UL (ref 3.8–5.2)
SARS-COV-2 RNA RESP QL NAA+PROBE: NEGATIVE
SPECIMEN EXPIRATION DATE: NORMAL
WBC # BLD AUTO: 10.6 10E3/UL (ref 4–11)

## 2022-11-09 PROCEDURE — 86901 BLOOD TYPING SEROLOGIC RH(D): CPT | Performed by: OBSTETRICS & GYNECOLOGY

## 2022-11-09 PROCEDURE — 258N000003 HC RX IP 258 OP 636: Performed by: OBSTETRICS & GYNECOLOGY

## 2022-11-09 PROCEDURE — 85027 COMPLETE CBC AUTOMATED: CPT | Performed by: OBSTETRICS & GYNECOLOGY

## 2022-11-09 PROCEDURE — 00HU33Z INSERTION OF INFUSION DEVICE INTO SPINAL CANAL, PERCUTANEOUS APPROACH: ICD-10-PCS | Performed by: ANESTHESIOLOGY

## 2022-11-09 PROCEDURE — 10907ZC DRAINAGE OF AMNIOTIC FLUID, THERAPEUTIC FROM PRODUCTS OF CONCEPTION, VIA NATURAL OR ARTIFICIAL OPENING: ICD-10-PCS | Performed by: OBSTETRICS & GYNECOLOGY

## 2022-11-09 PROCEDURE — 250N000011 HC RX IP 250 OP 636: Performed by: ANESTHESIOLOGY

## 2022-11-09 PROCEDURE — 3E0R3BZ INTRODUCTION OF ANESTHETIC AGENT INTO SPINAL CANAL, PERCUTANEOUS APPROACH: ICD-10-PCS | Performed by: ANESTHESIOLOGY

## 2022-11-09 PROCEDURE — 86780 TREPONEMA PALLIDUM: CPT | Performed by: OBSTETRICS & GYNECOLOGY

## 2022-11-09 PROCEDURE — 120N000001 HC R&B MED SURG/OB

## 2022-11-09 PROCEDURE — 36415 COLL VENOUS BLD VENIPUNCTURE: CPT | Performed by: OBSTETRICS & GYNECOLOGY

## 2022-11-09 PROCEDURE — U0003 INFECTIOUS AGENT DETECTION BY NUCLEIC ACID (DNA OR RNA); SEVERE ACUTE RESPIRATORY SYNDROME CORONAVIRUS 2 (SARS-COV-2) (CORONAVIRUS DISEASE [COVID-19]), AMPLIFIED PROBE TECHNIQUE, MAKING USE OF HIGH THROUGHPUT TECHNOLOGIES AS DESCRIBED BY CMS-2020-01-R: HCPCS | Performed by: OBSTETRICS & GYNECOLOGY

## 2022-11-09 PROCEDURE — 370N000003 HC ANESTHESIA WARD SERVICE

## 2022-11-09 RX ORDER — MISOPROSTOL 200 UG/1
400 TABLET ORAL
Status: DISCONTINUED | OUTPATIENT
Start: 2022-11-09 | End: 2022-11-10

## 2022-11-09 RX ORDER — ONDANSETRON 2 MG/ML
4 INJECTION INTRAMUSCULAR; INTRAVENOUS EVERY 6 HOURS PRN
Status: DISCONTINUED | OUTPATIENT
Start: 2022-11-09 | End: 2022-11-10

## 2022-11-09 RX ORDER — OXYTOCIN/0.9 % SODIUM CHLORIDE 30/500 ML
340 PLASTIC BAG, INJECTION (ML) INTRAVENOUS CONTINUOUS PRN
Status: DISCONTINUED | OUTPATIENT
Start: 2022-11-09 | End: 2022-11-10

## 2022-11-09 RX ORDER — NALOXONE HYDROCHLORIDE 0.4 MG/ML
0.2 INJECTION, SOLUTION INTRAMUSCULAR; INTRAVENOUS; SUBCUTANEOUS
Status: DISCONTINUED | OUTPATIENT
Start: 2022-11-09 | End: 2022-11-10

## 2022-11-09 RX ORDER — NALOXONE HYDROCHLORIDE 0.4 MG/ML
0.4 INJECTION, SOLUTION INTRAMUSCULAR; INTRAVENOUS; SUBCUTANEOUS
Status: DISCONTINUED | OUTPATIENT
Start: 2022-11-09 | End: 2022-11-10

## 2022-11-09 RX ORDER — METOCLOPRAMIDE HYDROCHLORIDE 5 MG/ML
10 INJECTION INTRAMUSCULAR; INTRAVENOUS EVERY 6 HOURS PRN
Status: DISCONTINUED | OUTPATIENT
Start: 2022-11-09 | End: 2022-11-10

## 2022-11-09 RX ORDER — CITRIC ACID/SODIUM CITRATE 334-500MG
30 SOLUTION, ORAL ORAL
Status: DISCONTINUED | OUTPATIENT
Start: 2022-11-09 | End: 2022-11-10

## 2022-11-09 RX ORDER — KETOROLAC TROMETHAMINE 30 MG/ML
30 INJECTION, SOLUTION INTRAMUSCULAR; INTRAVENOUS
Status: DISCONTINUED | OUTPATIENT
Start: 2022-11-09 | End: 2022-11-10

## 2022-11-09 RX ORDER — TRANEXAMIC ACID 10 MG/ML
1 INJECTION, SOLUTION INTRAVENOUS EVERY 30 MIN PRN
Status: DISCONTINUED | OUTPATIENT
Start: 2022-11-09 | End: 2022-11-10

## 2022-11-09 RX ORDER — BUPIVACAINE HYDROCHLORIDE 2.5 MG/ML
10 INJECTION, SOLUTION EPIDURAL; INFILTRATION; INTRACAUDAL ONCE
Status: DISCONTINUED | OUTPATIENT
Start: 2022-11-09 | End: 2022-11-10

## 2022-11-09 RX ORDER — PROCHLORPERAZINE MALEATE 10 MG
10 TABLET ORAL EVERY 6 HOURS PRN
Status: DISCONTINUED | OUTPATIENT
Start: 2022-11-09 | End: 2022-11-10

## 2022-11-09 RX ORDER — LIDOCAINE HYDROCHLORIDE AND EPINEPHRINE 15; 5 MG/ML; UG/ML
3 INJECTION, SOLUTION EPIDURAL
Status: DISCONTINUED | OUTPATIENT
Start: 2022-11-09 | End: 2022-11-10

## 2022-11-09 RX ORDER — MISOPROSTOL 200 UG/1
800 TABLET ORAL
Status: DISCONTINUED | OUTPATIENT
Start: 2022-11-09 | End: 2022-11-10

## 2022-11-09 RX ORDER — OXYTOCIN/0.9 % SODIUM CHLORIDE 30/500 ML
100-340 PLASTIC BAG, INJECTION (ML) INTRAVENOUS CONTINUOUS PRN
Status: DISCONTINUED | OUTPATIENT
Start: 2022-11-09 | End: 2022-11-10

## 2022-11-09 RX ORDER — ONDANSETRON 4 MG/1
4 TABLET, ORALLY DISINTEGRATING ORAL EVERY 6 HOURS PRN
Status: DISCONTINUED | OUTPATIENT
Start: 2022-11-09 | End: 2022-11-10

## 2022-11-09 RX ORDER — OXYTOCIN 10 [USP'U]/ML
10 INJECTION, SOLUTION INTRAMUSCULAR; INTRAVENOUS
Status: DISCONTINUED | OUTPATIENT
Start: 2022-11-09 | End: 2022-11-10

## 2022-11-09 RX ORDER — LIDOCAINE 40 MG/G
CREAM TOPICAL
Status: DISCONTINUED | OUTPATIENT
Start: 2022-11-09 | End: 2022-11-10

## 2022-11-09 RX ORDER — METOCLOPRAMIDE 10 MG/1
10 TABLET ORAL EVERY 6 HOURS PRN
Status: DISCONTINUED | OUTPATIENT
Start: 2022-11-09 | End: 2022-11-10

## 2022-11-09 RX ORDER — SODIUM CHLORIDE, SODIUM LACTATE, POTASSIUM CHLORIDE, CALCIUM CHLORIDE 600; 310; 30; 20 MG/100ML; MG/100ML; MG/100ML; MG/100ML
INJECTION, SOLUTION INTRAVENOUS CONTINUOUS
Status: DISCONTINUED | OUTPATIENT
Start: 2022-11-09 | End: 2022-11-10

## 2022-11-09 RX ORDER — PROCHLORPERAZINE 25 MG
25 SUPPOSITORY, RECTAL RECTAL EVERY 12 HOURS PRN
Status: DISCONTINUED | OUTPATIENT
Start: 2022-11-09 | End: 2022-11-10

## 2022-11-09 RX ORDER — SODIUM CHLORIDE, SODIUM LACTATE, POTASSIUM CHLORIDE, CALCIUM CHLORIDE 600; 310; 30; 20 MG/100ML; MG/100ML; MG/100ML; MG/100ML
INJECTION, SOLUTION INTRAVENOUS CONTINUOUS PRN
Status: DISCONTINUED | OUTPATIENT
Start: 2022-11-09 | End: 2022-11-10

## 2022-11-09 RX ORDER — NALBUPHINE HYDROCHLORIDE 10 MG/ML
2.5-5 INJECTION, SOLUTION INTRAMUSCULAR; INTRAVENOUS; SUBCUTANEOUS EVERY 6 HOURS PRN
Status: DISCONTINUED | OUTPATIENT
Start: 2022-11-09 | End: 2022-11-10

## 2022-11-09 RX ORDER — BUPIVACAINE HYDROCHLORIDE 2.5 MG/ML
INJECTION, SOLUTION EPIDURAL; INFILTRATION; INTRACAUDAL
Status: COMPLETED | OUTPATIENT
Start: 2022-11-09 | End: 2022-11-09

## 2022-11-09 RX ORDER — FENTANYL CITRATE-0.9 % NACL/PF 10 MCG/ML
100 PLASTIC BAG, INJECTION (ML) INTRAVENOUS EVERY 5 MIN PRN
Status: DISCONTINUED | OUTPATIENT
Start: 2022-11-09 | End: 2022-11-10

## 2022-11-09 RX ORDER — IBUPROFEN 800 MG/1
800 TABLET, FILM COATED ORAL
Status: DISCONTINUED | OUTPATIENT
Start: 2022-11-09 | End: 2022-11-10

## 2022-11-09 RX ORDER — OXYTOCIN/0.9 % SODIUM CHLORIDE 30/500 ML
1-24 PLASTIC BAG, INJECTION (ML) INTRAVENOUS CONTINUOUS
Status: DISCONTINUED | OUTPATIENT
Start: 2022-11-10 | End: 2022-11-10

## 2022-11-09 RX ORDER — CARBOPROST TROMETHAMINE 250 UG/ML
250 INJECTION, SOLUTION INTRAMUSCULAR
Status: DISCONTINUED | OUTPATIENT
Start: 2022-11-09 | End: 2022-11-10

## 2022-11-09 RX ORDER — METHYLERGONOVINE MALEATE 0.2 MG/ML
200 INJECTION INTRAVENOUS
Status: DISCONTINUED | OUTPATIENT
Start: 2022-11-09 | End: 2022-11-10

## 2022-11-09 RX ADMIN — BUPIVACAINE HYDROCHLORIDE 10 ML: 2.5 INJECTION, SOLUTION EPIDURAL; INFILTRATION; INTRACAUDAL at 19:57

## 2022-11-09 RX ADMIN — SODIUM CHLORIDE, POTASSIUM CHLORIDE, SODIUM LACTATE AND CALCIUM CHLORIDE: 600; 310; 30; 20 INJECTION, SOLUTION INTRAVENOUS at 19:16

## 2022-11-09 RX ADMIN — SODIUM CHLORIDE, POTASSIUM CHLORIDE, SODIUM LACTATE AND CALCIUM CHLORIDE: 600; 310; 30; 20 INJECTION, SOLUTION INTRAVENOUS at 20:14

## 2022-11-09 RX ADMIN — Medication: at 19:58

## 2022-11-09 ASSESSMENT — ACTIVITIES OF DAILY LIVING (ADL)
ADLS_ACUITY_SCORE: 35
ADLS_ACUITY_SCORE: 18
ADLS_ACUITY_SCORE: 18

## 2022-11-09 NOTE — TELEPHONE ENCOUNTER
"OB Triage Call      Is patient's OB/Midwife with the formerly [facility] or [facility]? [facility]- Proceed with triage     Reason for call: Having low abdominal and pelvic pain    Assessment: Low back, abdominal, pelvic pain \"kind of coming and going but there is a constant ache,\" 6/10.  Having pelvic pressure.    Plan: Go to LD    Patient plans to deliver at [facility]     Patient's primary OB Provider is Phil Land MD    Per protocol recommendations Patient to be evaluated in L&D. Patient's primary OB is [facility].  Labor and delivery at [facility] ([phone]) notified of patient's pending arrival.  Report given to Colleen.      Is patient's delivering hospital on divert? No      37w0d    Estimated Date of Delivery: 2022        OB History    Para Term  AB Living   4 1 1 0 2 1   SAB IAB Ectopic Multiple Live Births   2 0 0 0 1      # Outcome Date GA Lbr Isidro/2nd Weight Sex Delivery Anes PTL Lv   4 Current            3 Term 10/21/21 39w2d 03:03 / 02:18 3.345 kg (7 lb 6 oz) M Vag-Spont EPI N ROBERT      Name: GAETANO ANDRADE-NICOLLETTE      Apgar1: 9  Apgar5: 9   2 2014 4w0d          1 2014 6w0d    AB, COMPLETE          No results found for: GBS       Corrine Dash RN 22 5:35 PM  [facility] Nurse Advisor    Reason for Disposition    MODERATE-SEVERE abdominal pain    Additional Information    Negative: Passed out (i.e., lost consciousness, collapsed and was not responding)    Negative: Shock suspected (e.g., cold/pale/clammy skin, too weak to stand, low BP, rapid pulse)    Negative: Difficult to awaken or acting confused (e.g., disoriented, slurred speech)    Negative: [1] SEVERE abdominal pain (e.g., excruciating) AND [2] constant AND [3] present > 1 hour    Negative: Severe bleeding (e.g., continuous red blood from vagina, or large blood clots)    Negative: Umbilical cord hanging out of the vagina (shiny, white, curled appearance, \"like telephone cord\")    Negative: " Uncontrollable urge to push (i.e., feels like baby is coming out now)    Negative: Can see baby    Negative: Sounds like a life-threatening emergency to the triager    Protocols used: PREGNANCY - LABOR - [id number]

## 2022-11-10 LAB — T PALLIDUM AB SER QL: NONREACTIVE

## 2022-11-10 PROCEDURE — 258N000003 HC RX IP 258 OP 636: Performed by: OBSTETRICS & GYNECOLOGY

## 2022-11-10 PROCEDURE — 722N000001 HC LABOR CARE VAGINAL DELIVERY SINGLE

## 2022-11-10 PROCEDURE — 120N000001 HC R&B MED SURG/OB

## 2022-11-10 PROCEDURE — 250N000011 HC RX IP 250 OP 636: Performed by: ANESTHESIOLOGY

## 2022-11-10 PROCEDURE — 250N000011 HC RX IP 250 OP 636

## 2022-11-10 PROCEDURE — 59400 OBSTETRICAL CARE: CPT | Performed by: OBSTETRICS & GYNECOLOGY

## 2022-11-10 PROCEDURE — 250N000013 HC RX MED GY IP 250 OP 250 PS 637: Performed by: OBSTETRICS & GYNECOLOGY

## 2022-11-10 PROCEDURE — 250N000009 HC RX 250: Performed by: OBSTETRICS & GYNECOLOGY

## 2022-11-10 RX ORDER — MISOPROSTOL 200 UG/1
400 TABLET ORAL
Status: DISCONTINUED | OUTPATIENT
Start: 2022-11-10 | End: 2022-11-11 | Stop reason: HOSPADM

## 2022-11-10 RX ORDER — IBUPROFEN 800 MG/1
800 TABLET, FILM COATED ORAL EVERY 6 HOURS PRN
Status: DISCONTINUED | OUTPATIENT
Start: 2022-11-10 | End: 2022-11-11 | Stop reason: HOSPADM

## 2022-11-10 RX ORDER — MISOPROSTOL 200 UG/1
800 TABLET ORAL
Status: DISCONTINUED | OUTPATIENT
Start: 2022-11-10 | End: 2022-11-11 | Stop reason: HOSPADM

## 2022-11-10 RX ORDER — OXYTOCIN/0.9 % SODIUM CHLORIDE 30/500 ML
340 PLASTIC BAG, INJECTION (ML) INTRAVENOUS CONTINUOUS PRN
Status: DISCONTINUED | OUTPATIENT
Start: 2022-11-10 | End: 2022-11-11 | Stop reason: HOSPADM

## 2022-11-10 RX ORDER — OXYTOCIN 10 [USP'U]/ML
10 INJECTION, SOLUTION INTRAMUSCULAR; INTRAVENOUS
Status: DISCONTINUED | OUTPATIENT
Start: 2022-11-10 | End: 2022-11-11 | Stop reason: HOSPADM

## 2022-11-10 RX ORDER — SUMATRIPTAN 25 MG/1
25 TABLET, FILM COATED ORAL
Status: DISCONTINUED | OUTPATIENT
Start: 2022-11-10 | End: 2022-11-11 | Stop reason: HOSPADM

## 2022-11-10 RX ORDER — TRANEXAMIC ACID 10 MG/ML
1 INJECTION, SOLUTION INTRAVENOUS EVERY 30 MIN PRN
Status: DISCONTINUED | OUTPATIENT
Start: 2022-11-10 | End: 2022-11-11 | Stop reason: HOSPADM

## 2022-11-10 RX ORDER — CARBOPROST TROMETHAMINE 250 UG/ML
250 INJECTION, SOLUTION INTRAMUSCULAR
Status: DISCONTINUED | OUTPATIENT
Start: 2022-11-10 | End: 2022-11-11 | Stop reason: HOSPADM

## 2022-11-10 RX ORDER — METHYLERGONOVINE MALEATE 0.2 MG/ML
200 INJECTION INTRAVENOUS
Status: DISCONTINUED | OUTPATIENT
Start: 2022-11-10 | End: 2022-11-11 | Stop reason: HOSPADM

## 2022-11-10 RX ORDER — ACETAMINOPHEN 325 MG/1
650 TABLET ORAL EVERY 4 HOURS PRN
Status: DISCONTINUED | OUTPATIENT
Start: 2022-11-10 | End: 2022-11-11 | Stop reason: HOSPADM

## 2022-11-10 RX ORDER — BISACODYL 10 MG
10 SUPPOSITORY, RECTAL RECTAL DAILY PRN
Status: DISCONTINUED | OUTPATIENT
Start: 2022-11-10 | End: 2022-11-11 | Stop reason: HOSPADM

## 2022-11-10 RX ORDER — KETOROLAC TROMETHAMINE 30 MG/ML
INJECTION, SOLUTION INTRAMUSCULAR; INTRAVENOUS
Status: COMPLETED
Start: 2022-11-10 | End: 2022-11-10

## 2022-11-10 RX ORDER — HYDROCORTISONE 25 MG/G
CREAM TOPICAL 3 TIMES DAILY PRN
Status: DISCONTINUED | OUTPATIENT
Start: 2022-11-10 | End: 2022-11-11 | Stop reason: HOSPADM

## 2022-11-10 RX ORDER — MODIFIED LANOLIN
OINTMENT (GRAM) TOPICAL
Status: DISCONTINUED | OUTPATIENT
Start: 2022-11-10 | End: 2022-11-11 | Stop reason: HOSPADM

## 2022-11-10 RX ORDER — DOCUSATE SODIUM 100 MG/1
100 CAPSULE, LIQUID FILLED ORAL DAILY
Status: DISCONTINUED | OUTPATIENT
Start: 2022-11-10 | End: 2022-11-11 | Stop reason: HOSPADM

## 2022-11-10 RX ADMIN — ACETAMINOPHEN 650 MG: 325 TABLET, FILM COATED ORAL at 18:23

## 2022-11-10 RX ADMIN — NALBUPHINE HYDROCHLORIDE 2.5 MG: 10 INJECTION, SOLUTION INTRAMUSCULAR; INTRAVENOUS; SUBCUTANEOUS at 04:15

## 2022-11-10 RX ADMIN — IBUPROFEN 800 MG: 800 TABLET, FILM COATED ORAL at 20:25

## 2022-11-10 RX ADMIN — NALBUPHINE HYDROCHLORIDE 2.5 MG: 10 INJECTION, SOLUTION INTRAMUSCULAR; INTRAVENOUS; SUBCUTANEOUS at 02:28

## 2022-11-10 RX ADMIN — KETOROLAC TROMETHAMINE 30 MG: 30 INJECTION, SOLUTION INTRAMUSCULAR at 07:33

## 2022-11-10 RX ADMIN — IBUPROFEN 800 MG: 800 TABLET, FILM COATED ORAL at 14:32

## 2022-11-10 RX ADMIN — Medication 2 MILLI-UNITS/MIN: at 02:31

## 2022-11-10 RX ADMIN — ACETAMINOPHEN 650 MG: 325 TABLET, FILM COATED ORAL at 22:23

## 2022-11-10 RX ADMIN — KETOROLAC TROMETHAMINE 30 MG: 30 INJECTION, SOLUTION INTRAMUSCULAR; INTRAVENOUS at 07:33

## 2022-11-10 RX ADMIN — ACETAMINOPHEN 650 MG: 325 TABLET, FILM COATED ORAL at 14:31

## 2022-11-10 RX ADMIN — ACETAMINOPHEN 650 MG: 325 TABLET, FILM COATED ORAL at 09:50

## 2022-11-10 RX ADMIN — DOCUSATE SODIUM 100 MG: 100 CAPSULE, LIQUID FILLED ORAL at 11:05

## 2022-11-10 RX ADMIN — Medication: at 04:16

## 2022-11-10 RX ADMIN — SODIUM CHLORIDE, POTASSIUM CHLORIDE, SODIUM LACTATE AND CALCIUM CHLORIDE: 600; 310; 30; 20 INJECTION, SOLUTION INTRAVENOUS at 04:14

## 2022-11-10 ASSESSMENT — ACTIVITIES OF DAILY LIVING (ADL)
ADLS_ACUITY_SCORE: 18

## 2022-11-10 NOTE — PLAN OF CARE
Patient brought to room 452 at 0935 with infant in arms. Spouse present. Gave report to MARIAH RN. Patient and family oriented to room and floor. Call light within reach. Fundus checked and ID bands verified with postpartum RN. Questions/concerns addressed. Prior to transfer, pt able to ambulate to bathroom but unable to void. She said this happened with her last delivery of her son. Rutledge pulled approx 6:30 before delivery. Pt encouraged to drink fluids and attempt with new RN.

## 2022-11-10 NOTE — ANESTHESIA POSTPROCEDURE EVALUATION
Patient: Nicollette M Armijos    Procedure: * No procedures listed *       Anesthesia Type:  Epidural    Note:     Postop Pain Control:    PONV:    Neuro/Psych:    Airway/Respiratory:    CV/Hemodynamics:    Other NRE:    DID A NON-ROUTINE EVENT OCCUR?     Event details/Postop Comments:      S/P epidural for labor.   I or my partner was immediately available for management of this patient during epidural analgesia infusion.  VSS.  Doing well. Block resolved.  Neuro at baseline. Denies positional headache. Minimal side effects easily managed w/ PRN meds. No apparent anesthetic complications. No follow-up required.    Jeffrey Johnson MD             Last vitals:  Vitals:    11/10/22 0630 11/10/22 0645 11/10/22 0700   BP: 108/64 114/57 103/58   Resp:      Temp:  98.4  F (36.9  C)        Electronically Signed By: Jeffrey Johnson MD  November 10, 2022  7:46 AM

## 2022-11-10 NOTE — ANESTHESIA PROCEDURE NOTES
"Epidural catheter Procedure Note    Pre-Procedure   Staff -        Anesthesiologist:  Lucas Amezquita MD       Performed By: anesthesiologist       Referred By: Antoine       Location: OB       Pre-Anesthestic Checklist: patient identified, IV checked, risks and benefits discussed, informed consent, monitors and equipment checked, pre-op evaluation, at physician/surgeon's request and post-op pain management  Timeout:       Correct Patient: Yes        Correct Procedure: Yes        Correct Site: Yes        Correct Position: Yes   Procedure Documentation  Procedure: epidural catheter       Patient Position: sitting       Patient Prep/Sterile Barriers: patient draped       Skin prep: Betadine       Local skin infiltrated with mL of 1% lidocaine.        Insertion Site: L3-4. (midline approach).       AERLINE at 4 cm.       Needle Type: Touhy needle       Needle Gauge: 17.        Needle Length (Inches): 3.5        Catheter: 19 G.          Catheter threaded easily.         6 cm epidural space.         Threaded 10 cm at skin.         # of attempts: 1 and  # of redirects:     Assessment/Narrative         Paresthesias: No.       Test dose of 3 mL lidocaine 1.5% w/ 1:200,000 epinephrine at 19:54 CST.         Test dose negative, 3 minutes after injection, for signs of intravascular, subdural, or intrathecal injection.       Aspiration negative for Heme or CSF via Epidural Catheter.    Medication(s) Administered   0.25% Bupivacaine PF (Epidural) - EPIDURAL   10 mL - 11/9/2022 7:57:00 PM    FOR [facility]) ONLY:   Pain Team Contact information: please page the Pain Team [address]. Search \"Pain\". During daytime hours, please page the attending first. At night please page the resident first.    "

## 2022-11-10 NOTE — PROVIDER NOTIFICATION
11/10/22 0550   Provider Notification   Provider Name/Title Dr. Schultz   Method of Notification At Bedside   Notification Reason Labor Status       Dr. Schultz at bedside for an update on patient. Last SVE ~1hr ago and was 7-8cm at that time. Patient currently not feeling pressure. Discontinuous tracing but audible prolong deceleration. Resolved with position change. Will CTM.

## 2022-11-10 NOTE — ANESTHESIA PREPROCEDURE EVALUATION
Anesthesia Pre-Procedure Evaluation    Patient: Nicollette M Armijos   MRN: [id number] : 2000        Procedure :           Past Medical History:   Diagnosis Date     Asthma     slight asthma     Constipation      Depressive disorder     depression and anxiety; pre-pregnancy on zoloft (nothing now)     Ovarian cyst, left      RSV bronchitis 10/6/2022     Urinary tract infection      Varicella     vaccinated      Past Surgical History:   Procedure Laterality Date     DILATION AND CURETTAGE SUCTION N/A 2014    Procedure: DILATION AND CURETTAGE SUCTION;  Surgeon: Petra Chase MD;  Location: [facility] OR     ESOPHAGOSCOPY, GASTROSCOPY, DUODENOSCOPY (EGD), COMBINED  2013    Procedure: COMBINED ESOPHAGOSCOPY, GASTROSCOPY, DUODENOSCOPY (EGD);  COLONOSCOPY & ESOPHAGOSCOPY, GASTROSCOPY, DUODENOSCOPY (EGD);  Surgeon: Esthela Regalado MD;  Location: [facility] OR     INSERT INTRAUTERINE DEVICE N/A 2014    Procedure: INSERT INTRAUTERINE DEVICE;  Surgeon: Petra Chase MD;  Location: [facility] OR     SIGMOIDOSCOPY FLEXIBLE  2013    Procedure: SIGMOIDOSCOPY FLEXIBLE;;  Surgeon: Esthela Regalado MD;  Location: [facility] OR     TONSILLECTOMY  2017      Allergies   Allergen Reactions     Penicillins Nausea and Vomiting      Social History     Tobacco Use     Smoking status: Never     Smokeless tobacco: Never   Substance Use Topics     Alcohol use: No      Wt Readings from Last 1 Encounters:   22 68.9 kg (152 lb)        Anesthesia Evaluation        No history of anesthetic complications       ROS/MED HX  ENT/Pulmonary:     (+) asthma     Neurologic:  - neg neurologic ROS     Cardiovascular:  - neg cardiovascular ROS     METS/Exercise Tolerance:     Hematologic:  - neg hematologic  ROS     Musculoskeletal:       GI/Hepatic:  - neg GI/hepatic ROS     Renal/Genitourinary:       Endo:  - neg endo ROS     Psychiatric/Substance Use:  - neg psychiatric ROS     Infectious Disease:       Malignancy:       Other:      (-) previous  and TOLAC candidate       Physical Exam    Airway        Mallampati: II   TM distance: > 3 FB   Neck ROM: full   Mouth opening: > 3 cm    Respiratory Devices and Support         Dental  no notable dental history         Cardiovascular   cardiovascular exam normal          Pulmonary   pulmonary exam normal                OUTSIDE LABS:  CBC:   Lab Results   Component Value Date    WBC 10.6 2022    WBC 10.5 10/06/2022    HGB 10.6 (L) 2022    HGB 10.5 (L) 10/06/2022    HCT 34.2 (L) 2022    HCT 32.5 (L) 10/06/2022     2022     10/06/2022     BMP:   Lab Results   Component Value Date     2021     2021    POTASSIUM 3.6 2021    POTASSIUM 3.4 2021    CHLORIDE 110 (H) 2021    CHLORIDE 105 2021    CO2 25 2021    CO2 23 2021    BUN 8 2021    BUN 6 (L) 2021    CR 0.61 2021    CR 0.65 2021    GLC 98 2021    GLC 81 2021     COAGS: No results found for: PTT, INR, FIBR  POC:   Lab Results   Component Value Date    HCG Negative 2015    HCGS Negative 2015     HEPATIC:   Lab Results   Component Value Date    ALBUMIN 3.6 2021    PROTTOTAL 6.8 2021    ALT 15 2021    AST 15 2021    ALKPHOS 112 2021    BILITOTAL 0.2 2021     OTHER:   Lab Results   Component Value Date    LACT 0.8 2015    A1C 4.8 2021    CHENG 8.2 (L) 2021    LIPASE 95 2021    AMYLASE 65 2021    TSH 0.76 2013    CRP 18.6 (H) 2015    SED 7 2013       Anesthesia Plan    ASA Status:  2      Anesthesia Type: Epidural.              Consents    Anesthesia Plan(s) and associated risks, benefits, and realistic alternatives discussed. Questions answered and patient/representative(s) expressed understanding.    - Discussed:     - Discussed with:  Patient         Postoperative Care            Comments:           dennis Zavala  Balta Amezquita MD

## 2022-11-10 NOTE — PROVIDER NOTIFICATION
11/09/22 2040   Provider Notification   Provider Name/Title Dr. Schultz   Method of Notification In Department   Request Evaluate - Remote   Notification Reason Fetal Baseline Change;Variability Change       Dr. Schultz reviewed EFM tracing with writer. Period of sinusoidal vs marked variability. Cat 1 tracing prior to this episode. Will continue to monitor.

## 2022-11-10 NOTE — PLAN OF CARE
Patient VSS, good pain control with medications as per MAR. Patient also using tucks and maximino-bottle for pain control and comfort. Fundal checks WDL. Encouraged to stay hydrated and void frequently. Ambulating and self cares independently.  Breast fed initially and is not going to pump and feed.  She has her own pumping system from home.  Abdominal binder given.  FOB at the bedside and attentive to Mom and active in baby cares.  Both parents bonding with the baby.

## 2022-11-10 NOTE — PROGRESS NOTES
OB PN    Comfortable with epidural  FHTs Category 1 with ctx Q3-5, somewhat spaced since epidural    Cervix 4/70/-1/post  AROM clear fluid    Monitor progress and ctx pattern.  Pitocin augmentation prn.    Faustino Schultz MD

## 2022-11-10 NOTE — PROVIDER NOTIFICATION
11/09/22 0404   Provider Notification   Provider Name/Title Dr. Schultz   Method of Notification In Department   Notification Reason Labor Status     Dr. Schultz called writer for an update. Patient and spouse agreeable to AROM at this time. MD to bedside. AROM for clear fluid. Will CTM.

## 2022-11-10 NOTE — PLAN OF CARE
Data: Patient presented to [facility]: 2022  6:05 PM.  Reason for maternal/fetal assessment is uterine contractions. Patient reports dizziness with floaters this AM which have since resolved. Now pt states she is having sharp lower back pain and cramping.  Patient is a .  Prenatal record reviewed. Pregnancy  has been uncomplicated.  Gestational Age 37w0d. VSS. Fetal movement active. Patient denies leaking of vaginal fluid/rupture of membranes, vaginal bleeding, nausea, vomiting, headache, epigastric or URQ pain, significant edema. Support person is not present.   Action: Verbal consent for EFM. Triage assessment completed. Bill of rights reviewed.  Response: Patient verbalized agreement with plan. Will contact Dr Valdo Schultz with update and for further orders.

## 2022-11-10 NOTE — PROVIDER NOTIFICATION
11/09/22 1834   Provider Notification   Provider Name/Title Dr. Shcultz   Method of Notification Phone   Orders for intrapartum admission. MD states to move pt to labor room.

## 2022-11-10 NOTE — L&D DELIVERY NOTE
OB Vaginal Delivery Note    Nicollette M Armijos MRN# [id number]   Age: 22 year old YOB: 2000       GA: 37w1d  GP:   Labor Complications: None   EBL:   mL  Delivery QBL:    Delivery Type: Vaginal, Spontaneous   ROM to Delivery Time: (Delivered) Hours: 6 Minutes: 54   Weight:     1 Minute 5 Minute 10 Minute   Apgar Totals:            FRANCISCO ALBARADO;IZA CACERES     Delivery Details:  Nicollette M Armijos, a 22 year old  female delivered a viable infant with apgars of   and  . Patient was fully dilated and pushing after 14  hours 28  minutes in active labor. Delivery was via vaginal, spontaneous  to a sterile field under epidural  anesthesia. Infant delivered in vertex  right  occiput  anterior  position. Anterior and posterior shoulders delivered without difficulty. The cord was clamped, cut twice and   were noted. Cord blood was obtained in routine fashion with the following disposition:  .      Cord complications: none   Placenta delivered at 11/10/2022  6:37 AM . Placental disposition was Hospital disposal . Fundal massage performed and fundus found to be firm.     Episiotomy: none    Perineum, vagina, cervix were inspected, and the following lacerations were noted:   Perineal lacerations:                 Any lacerations were repaired in the usual fashion using N/A.    Excellent hemostasis was noted. Needle count correct. Infant and patient in delivery room in good and stable condition.        Alexandre Soria-Nicollette [[id number]]    Labor Event Times    Labor onset date: 22 Onset time:  4:00 PM   Dilation complete date: 11/10/22 Complete time:  6:28 AM   Start pushing date/time: 11/10/2022 0634      Labor Length    1st Stage (hrs): 14 (min): 28   2nd Stage (hrs): 0 (min): 6   3rd Stage (hrs): 0 (min): 3      Labor Events     steroids: Full Course  Labor Type: Spontaneous, Augmentation  Predominate monitoring during 1st stage: continuous electronic fetal  monitoring     Antibiotics received during labor?: No     Rupture date/time: 11/9/22 2340   Rupture type: Artificial Rupture of Membranes  Fluid color: Clear  Fluid odor: Normal     Augmentation: AROM, Oxytocin  Augmentation date/time: 11/10/22 0231   Indications for augmentation: Ineffective Contraction Pattern     Delivery/Placenta Date and Time    Delivery Date: 11/10/22 Delivery Time:  6:34 AM   Placenta Date/Time: 11/10/2022  6:37 AM  Oxytocin given at the time of delivery: after delivery of placenta  Delivering clinician: Valdo Schultz MD   Other personnel present at delivery:  Provider Role   Melissa Washington, RN Delivery Nurse   Miguel Lewis RN Delivery Assist         Vaginal Counts     Initial count performed by 2 team members:  Two Team Members   Dr.Sabal Rivera RN       Needles Suture Needles Sponges (RETIRED) Instruments   Initial counts 2  5    Added to count       Relief counts       Final counts             Placed during labor Accounted for at the end of labor   FSE No    IUPC No    Cervidil No NA                     Apgars    Living status: Living   1 Minute 5 Minute 10 Minute 15 Minute 20 Minute   Skin color:        Heart rate:        Reflex irritability:        Muscle tone:        Respiratory effort:        Total:           Cord    Cord Complications: None               Stem cell collection?: No       Labor Events and Shoulder Dystocia    Fetal Tracing Prior to Delivery: Category 1  Shoulder dystocia present?: Neg     Delivery (Maternal) (Provider to Complete) ([id number])    Episiotomy: None  Repair suture: None  Genital tract inspection done: Pos     Blood Loss  Mother: YangLorrie jack #[id number]   Start of Mother's Information    Delivery Blood Loss  11/09/22 1600 - 11/10/22 0646    None           End of Mother's Information  Mother: Lorrie Soria #[id number]          Delivery - Provider to Complete ([id number])    Delivering clinician: Valdo Schultz MD  Attempted  Delivery Types (Choose all that apply): Spontaneous Vaginal Delivery  Delivery Type (Choose the 1 that will go to the Birth History): Vaginal, Spontaneous                   Other personnel:  Provider Role   Melissa Washington, RN Delivery Nurse   Miguel Lewis RN Delivery Assist                Placenta    Date/Time: 11/10/2022  6:37 AM  Removal: Spontaneous  Comments: intact, 3 vessel cord  Disposition: Hospital disposal           Anesthesia    Method: Epidural  Cervical dilation at placement: 4-7     Analgesic:  BIRTH HISTORY: ANALGESIC   FENT 2 MCG/ML-BUPIV 0.125% (0.5% SOURCE) IN  ML CNR             Presentation and Position    Presentation: Vertex    Position: Right Occiput Anterior                 Valdo Schultz MD

## 2022-11-10 NOTE — PROVIDER NOTIFICATION
11/10/22 0345   Provider Notification   Provider Name/Title Dr. Schultz   Method of Notification Phone   Request Evaluate - Remote   Notification Reason Labor Status;SVE     Dr. Schultz called writer requesting status update. Updated on SVE, remains afebrile, and Pitocin started at 0230, Cat 1 tracing.

## 2022-11-10 NOTE — PROVIDER NOTIFICATION
22 1832   Provider Notification   Provider Name/Title Dr. Schultz   Method of Notification Electronic Page   Request Evaluate - Remote   Notification Reason Patient Arrived;SVE   Text page to Dr. Schultz:    ; 37w; Frequent, strong ctx's. FHT's cat I tracing. SVE /-1, anterior. Thinking labor.

## 2022-11-10 NOTE — H&P
[facility] Labor and Delivery History and Physical    Nicollette M Armijos MRN# [id number]   Age: 22 year old YOB: 2000     Date of Admission:  2022    Primary care provider: Kathrin Pedraza           Chief Complaint:   Nicollette M Armijos is a 22 year old female who is 37w0d pregnant and being admitted for active labor management.    She called reporting constant abdominal pain and pressure          Pregnancy history:     OBSTETRIC HISTORY:    OB History    Para Term  AB Living   4 1 1 0 2 1   SAB IAB Ectopic Multiple Live Births   2 0 0 0 1      # Outcome Date GA Lbr Isidro/2nd Weight Sex Delivery Anes PTL Lv   4 Current            3 Term 10/21/21 39w2d 03:03 / 02:18 3.345 kg (7 lb 6 oz) M Vag-Spont EPI N RBOERT      Name: GAETANO ANDRADE-NICOLLETTE      Apgar1: 9  Apgar5: 9   2 2014 4w0d          1 2014 6w0d    AB, COMPLETE          EDC: Estimated Date of Delivery: 22    Prenatal Labs:   Lab Results   Component Value Date    ABO O 2021    RH Pos 2021    AS Negative 10/06/2022    HEPBANG Nonreactive 2022    CHPCRT  2014     Negative   Negative for C. trachomatis rRNA by transcription mediated amplification.   A negative result by transcription mediated amplification does not preclude the   presence of C. trachomatis infection because results are dependent on proper   and adequate collection, absence of inhibitors, and sufficient rRNA to be   detected.      GCPCRT  2014     Negative   Negative for N. gonorrhoeae rRNA by transcription mediated amplification.   A negative result by transcription mediated amplification does not preclude the   presence of N. gonorrhoeae infection because results are dependent on proper   and adequate collection, absence of inhibitors, and sufficient rRNA to be   detected.      HGB 10.6 (L) 2022       GBS Status:   Neg 10/31/22    Active Problem List  Patient Active Problem List    Diagnosis     Asthma, exercise induced     BRIANNA (generalized anxiety disorder)     Major depressive disorder, single episode, moderate (H)     Uterine size-date discrepancy in third trimester     Circumvallate placenta during pregnancy in third trimester, antepartum      labor without delivery     RSV bronchitis     Labor and delivery, indication for care       Medication Prior to Admission  Medications Prior to Admission   Medication Sig Dispense Refill Last Dose     docusate sodium (COLACE) 100 MG capsule Take 100 mg by mouth daily        Prenatal Vit-Fe Fumarate-FA (PRENATAL VITAMIN) 27-0.8 MG TABS Take 1 tablet by mouth daily        SUMAtriptan (IMITREX) 25 MG tablet Take 1 tablet (25 mg) by mouth at onset of headache for migraine May repeat in 2 hours. Max 8 tablets/24 hours. 8 tablet 0    .        Maternal Past Medical History:     Past Medical History:   Diagnosis Date     Asthma     slight asthma     Constipation      Depressive disorder     depression and anxiety; pre-pregnancy on zoloft (nothing now)     Ovarian cyst, left      RSV bronchitis 10/6/2022     Urinary tract infection      Varicella     vaccinated                       Family History:     Family History   Problem Relation Age of Onset     No Known Problems Mother      Heart Disease Father      Depression Father      No Known Problems Brother      Ovarian Cancer Maternal Grandmother      Colon Cancer Maternal Grandmother      No Known Problems Paternal Grandmother      Diabetes Paternal Grandfather      Heart Disease Paternal Grandfather      Diabetes Brother      Family history reviewed and updated in EPIC and not discussed            Social History:     Social History     Tobacco Use     Smoking status: Never     Smokeless tobacco: Never   Substance Use Topics     Alcohol use: No            Review of Systems:   The Review of Systems is negative other than noted in the HPI          Physical Exam:   Vitals were reviewed  Temp: 99.2  F  (37.3  C) Temp src: Oral BP: 113/73     Resp: 18        Constitutional:   awake, alert, cooperative, no apparent distress, and appears stated age    Abdomen gravid  Cervix 4/80/-1 itact  Ext NT                    Assessment:   Nicollette M Armijos is a 37w0d pregnant female admitted with active labor management.          Plan:   Admit - see IP orders  OK for labor epidural  Monitor ptrogress    Valdo Schultz MD

## 2022-11-11 VITALS
DIASTOLIC BLOOD PRESSURE: 72 MMHG | SYSTOLIC BLOOD PRESSURE: 105 MMHG | HEART RATE: 84 BPM | RESPIRATION RATE: 17 BRPM | TEMPERATURE: 97.6 F

## 2022-11-11 PROBLEM — F41.1 GAD (GENERALIZED ANXIETY DISORDER): Status: RESOLVED | Noted: 2017-11-04 | Resolved: 2022-11-11

## 2022-11-11 PROBLEM — O26.843 UTERINE SIZE-DATE DISCREPANCY IN THIRD TRIMESTER: Status: RESOLVED | Noted: 2021-08-30 | Resolved: 2022-11-11

## 2022-11-11 PROBLEM — O60.00 PRETERM LABOR WITHOUT DELIVERY: Status: RESOLVED | Noted: 2022-10-07 | Resolved: 2022-11-11

## 2022-11-11 PROBLEM — O43.113 CIRCUMVALLATE PLACENTA DURING PREGNANCY IN THIRD TRIMESTER, ANTEPARTUM: Status: RESOLVED | Noted: 2022-08-22 | Resolved: 2022-11-11

## 2022-11-11 PROCEDURE — 250N000013 HC RX MED GY IP 250 OP 250 PS 637: Performed by: OBSTETRICS & GYNECOLOGY

## 2022-11-11 RX ORDER — OXYCODONE HYDROCHLORIDE 5 MG/1
5 TABLET ORAL ONCE
Status: COMPLETED | OUTPATIENT
Start: 2022-11-11 | End: 2022-11-11

## 2022-11-11 RX ORDER — IBUPROFEN 600 MG/1
600 TABLET, FILM COATED ORAL EVERY 6 HOURS PRN
Qty: 30 TABLET | Refills: 0 | Status: SHIPPED | OUTPATIENT
Start: 2022-11-11 | End: 2023-11-01

## 2022-11-11 RX ADMIN — ACETAMINOPHEN 650 MG: 325 TABLET, FILM COATED ORAL at 06:41

## 2022-11-11 RX ADMIN — IBUPROFEN 800 MG: 800 TABLET, FILM COATED ORAL at 02:43

## 2022-11-11 RX ADMIN — OXYCODONE HYDROCHLORIDE 5 MG: 5 TABLET ORAL at 01:06

## 2022-11-11 RX ADMIN — ACETAMINOPHEN 650 MG: 325 TABLET, FILM COATED ORAL at 02:43

## 2022-11-11 RX ADMIN — IBUPROFEN 800 MG: 800 TABLET, FILM COATED ORAL at 08:34

## 2022-11-11 RX ADMIN — DOCUSATE SODIUM 100 MG: 100 CAPSULE, LIQUID FILLED ORAL at 08:33

## 2022-11-11 ASSESSMENT — ACTIVITIES OF DAILY LIVING (ADL)
ADLS_ACUITY_SCORE: 18

## 2022-11-11 NOTE — DISCHARGE SUMMARY
[facility] OB Postpartum/Discharge Note    S:  Patient without complaints.  Minimal lochia. Would like to go home if baby okay for discharge.    O:  Blood pressure 105/72, pulse 84, temperature 97.6  F (36.4  C), temperature source Oral, resp. rate 17, last menstrual period 02/23/2022, unknown if currently breastfeeding.        Urine output adequate        Abdomen - Fundus firm, at umbilicus, nontender        Extremities - No calf tenderness    A:   Postpartum Day# 1, s/p Vaginal delivery - doing well    P:  1)  Discharge home if baby okay for discharge.  If baby has to stay, cancel discharge.        2)  F/U 6 weeks w/ Primary OB        3)  Discharge meds: Jeanna Land MD

## 2022-11-11 NOTE — PLAN OF CARE
Data: Vital signs within normal limits. Postpartum checks within normal limits - see flow record. Patient eating and drinking normally. Patient able to empty bladder independently and is up ambulating. No apparent signs of infection. Patient performing self cares and is able to care for infant.  Action: Patient medicated during the shift for cramping. See MAR. Patient reassessed within 1 hour after each medication and pain was improved - patient stated she was comfortable. Patient education completed.  Response: Positive attachment behaviors observed with infant.  present, supportive and involved in care.

## 2022-11-11 NOTE — PROGRESS NOTES
Discharge instructions reviewed with patient. All questions answered at this time. Patient stated she has breast pump at home. Discharge medications reviewed and given to patient. Patient discharged home with  and left the hospital at 1215.

## 2022-11-11 NOTE — PLAN OF CARE
vitally stable, fundal checks WDL, using abdominal binder and heating pad for afterpains, pain medications given with pain reassessment 1 hour after, voiding, no nausea, ambulating independently, appropriate bonding/cares for baby, pumping and bottle feeding, consented to donor milk as she requested due to discomfort from afterpains and using formula as well, given nipple cream/hydrogels for sore nipples education completed with all cares, interested in early discharge today (PPD screening done, just waiting to decide on name for birth certificate)

## 2022-11-11 NOTE — DISCHARGE INSTRUCTIONS
Postpartum Vaginal Delivery Instructions    Home care:  [phone]     Follow up in 6 weeks with primary OB.    Activity     Ask family and friends for help when you need it.  Do not place anything in your vagina for 6 weeks.  You are not restricted on other activities, but take it easy for a few weeks to allow your body to recover from delivery.  You are able to do any activities you feel up to that point.  No driving until you have stopped taking your pain medications (usually two weeks after delivery).     Call your health care provider if you have any of these symptoms:     Increased pain, swelling, redness, or fluid around your stiches from an episiotomy or perineal tear.  A fever above 100.4 F (38 C) with or without chills when placing a thermometer under your tongue.  You soak a sanitary pad with blood within 1 hour, or you see blood clots larger than a golf ball.  Bleeding that lasts more than 6 weeks.  Vaginal discharge that smells bad.  Severe pain, cramping or tenderness in your lower belly area.  A need to urinate more frequently (use the toilet more often), more urgently (use the toilet very quickly), or it burns when you urinate.  Nausea and vomiting.  Redness, swelling or pain around a vein in your leg.  Problems breastfeeding or a red or painful area on your breast.  Chest pain and cough or are gasping for air.  Problems coping with sadness, anxiety, or depression.  If you have any concerns about hurting yourself or the baby, call your provider immediately.   You have questions or concerns after you return home.     Keep your hands clean:  Always wash your hands before touching your perineal area and stitches.  This helps reduce your risk of infection.  If your hands aren't dirty, you may use an alcohol hand-rub to clean your hands. Keep your nails clean and short.

## 2022-11-11 NOTE — PROVIDER NOTIFICATION
11/11/22 0049   Provider Notification   Provider Name/Title Dr Mcdonnell   Method of Notification Phone   Request Evaluate-Remote   Notification Reason Medication Request  (After pains 8/10. Requesting additional pain med.)     See order for one time dose of oxycodone

## 2022-11-11 NOTE — PLAN OF CARE
Data: Vital signs within normal limits. Postpartum checks within normal limits - see flow record. Patient eating and drinking normally. Patient able to empty bladder independently and is up ambulating. No apparent signs of infection. Patient performing self cares and is able to care for infant.  Action: Patient medicated during the shift for cramping. See MAR. Patient reassessed within 1 hour after each medication and pain was improved - patient stated she was comfortable.   Response: Positive attachment behaviors observed with infant.  present, supportive and involved in care.     Patient would like early discharge on Friday (11/11/2022) if possible.

## 2022-11-15 ENCOUNTER — TELEPHONE (OUTPATIENT)
Dept: OBGYN | Facility: CLINIC | Age: 22
End: 2022-11-15

## 2022-11-15 NOTE — TELEPHONE ENCOUNTER
PP pt had vaginal delivery on 11/9/22    Calls with pelvic pain on lower left side pelvic area that is radiating to her back. Pressure in lower rectal area.    Nml BMs, she has to push harder to urinate and change her position a bit to get the urine to come out.  Pain scale 8/10    No fever. She is tearful and breathing through pain on the phone, stating pain has really increased at this point.    She took ibuprofen 600 mg recently but this hasnt helped yet.    Sent to ED for eval. Routed to on call as alia MAYER RN BSN             TRUMAN significant for vegetation on posterior leaflet of mitral valve  - Off abx, comfort care   - No AC at this time in setting of patient being unable to tolerate PO

## 2023-04-15 ENCOUNTER — HEALTH MAINTENANCE LETTER (OUTPATIENT)
Age: 23
End: 2023-04-15

## 2023-11-01 ENCOUNTER — OFFICE VISIT (OUTPATIENT)
Dept: MIDWIFE SERVICES | Facility: CLINIC | Age: 23
End: 2023-11-01
Payer: COMMERCIAL

## 2023-11-01 VITALS
WEIGHT: 138 LBS | HEIGHT: 66 IN | SYSTOLIC BLOOD PRESSURE: 102 MMHG | DIASTOLIC BLOOD PRESSURE: 72 MMHG | BODY MASS INDEX: 22.18 KG/M2

## 2023-11-01 DIAGNOSIS — Z12.4 SCREENING FOR CERVICAL CANCER: ICD-10-CM

## 2023-11-01 DIAGNOSIS — Z30.09 ENCOUNTER FOR COUNSELING REGARDING CONTRACEPTION: ICD-10-CM

## 2023-11-01 DIAGNOSIS — Z01.419 ENCOUNTER FOR GYNECOLOGICAL EXAMINATION WITHOUT ABNORMAL FINDING: Primary | ICD-10-CM

## 2023-11-01 DIAGNOSIS — Z13.29 SCREENING FOR ENDOCRINE, METABOLIC AND IMMUNITY DISORDER: ICD-10-CM

## 2023-11-01 DIAGNOSIS — Z13.0 SCREENING FOR ENDOCRINE, METABOLIC AND IMMUNITY DISORDER: ICD-10-CM

## 2023-11-01 DIAGNOSIS — Z13.228 SCREENING FOR ENDOCRINE, METABOLIC AND IMMUNITY DISORDER: ICD-10-CM

## 2023-11-01 LAB
ALBUMIN SERPL BCG-MCNC: 4.4 G/DL (ref 3.5–5.2)
ALP SERPL-CCNC: 77 U/L (ref 35–104)
ALT SERPL W P-5'-P-CCNC: 10 U/L (ref 0–50)
ANION GAP SERPL CALCULATED.3IONS-SCNC: 10 MMOL/L (ref 7–15)
AST SERPL W P-5'-P-CCNC: 14 U/L (ref 0–45)
BILIRUB SERPL-MCNC: 0.4 MG/DL
BUN SERPL-MCNC: 11.5 MG/DL (ref 6–20)
CALCIUM SERPL-MCNC: 9.4 MG/DL (ref 8.6–10)
CHLORIDE SERPL-SCNC: 102 MMOL/L (ref 98–107)
CHOLEST SERPL-MCNC: 145 MG/DL
CREAT SERPL-MCNC: 0.79 MG/DL (ref 0.51–0.95)
DEPRECATED HCO3 PLAS-SCNC: 25 MMOL/L (ref 22–29)
EGFRCR SERPLBLD CKD-EPI 2021: >90 ML/MIN/1.73M2
ERYTHROCYTE [DISTWIDTH] IN BLOOD BY AUTOMATED COUNT: 13 % (ref 10–15)
GLUCOSE SERPL-MCNC: 80 MG/DL (ref 70–99)
HCT VFR BLD AUTO: 41.5 % (ref 35–47)
HDLC SERPL-MCNC: 55 MG/DL
HGB BLD-MCNC: 13.7 G/DL (ref 11.7–15.7)
LDLC SERPL CALC-MCNC: 81 MG/DL
MCH RBC QN AUTO: 31.2 PG (ref 26.5–33)
MCHC RBC AUTO-ENTMCNC: 33 G/DL (ref 31.5–36.5)
MCV RBC AUTO: 95 FL (ref 78–100)
NONHDLC SERPL-MCNC: 90 MG/DL
PLATELET # BLD AUTO: 259 10E3/UL (ref 150–450)
POTASSIUM SERPL-SCNC: 3.8 MMOL/L (ref 3.4–5.3)
PROT SERPL-MCNC: 7 G/DL (ref 6.4–8.3)
RBC # BLD AUTO: 4.39 10E6/UL (ref 3.8–5.2)
SODIUM SERPL-SCNC: 137 MMOL/L (ref 135–145)
TRIGL SERPL-MCNC: 43 MG/DL
TSH SERPL DL<=0.005 MIU/L-ACNC: 0.89 UIU/ML (ref 0.3–4.2)
WBC # BLD AUTO: 7.1 10E3/UL (ref 4–11)

## 2023-11-01 PROCEDURE — 84443 ASSAY THYROID STIM HORMONE: CPT | Performed by: ADVANCED PRACTICE MIDWIFE

## 2023-11-01 PROCEDURE — 36415 COLL VENOUS BLD VENIPUNCTURE: CPT | Performed by: ADVANCED PRACTICE MIDWIFE

## 2023-11-01 PROCEDURE — 80053 COMPREHEN METABOLIC PANEL: CPT | Performed by: ADVANCED PRACTICE MIDWIFE

## 2023-11-01 PROCEDURE — G0145 SCR C/V CYTO,THINLAYER,RESCR: HCPCS | Performed by: ADVANCED PRACTICE MIDWIFE

## 2023-11-01 PROCEDURE — 80061 LIPID PANEL: CPT | Performed by: ADVANCED PRACTICE MIDWIFE

## 2023-11-01 PROCEDURE — 85027 COMPLETE CBC AUTOMATED: CPT | Performed by: ADVANCED PRACTICE MIDWIFE

## 2023-11-01 PROCEDURE — 99395 PREV VISIT EST AGE 18-39: CPT | Performed by: ADVANCED PRACTICE MIDWIFE

## 2023-11-01 RX ORDER — ALBUTEROL SULFATE 90 UG/1
1-2 AEROSOL, METERED RESPIRATORY (INHALATION) EVERY 4 HOURS PRN
COMMUNITY
Start: 2023-09-26 | End: 2023-11-06

## 2023-11-01 NOTE — PROGRESS NOTES
"Nicollette is a 23 year old  female who presents for annual exam.     Besides routine health maintenance,  she would like to discuss birth control and cyst.  HPI:    Has Luis - had placed in Dec 2022 at [facility].   Bleeds one day, then spots for 7-10 days  Had copper IUD in the past and she had many ovarian cysts, feeling like that is happening again.   Is interested in possibly switching to a different birth control. She doesn't like how much bleeding she has with Liletta.   Menses are irregular Patient's last menstrual period was 10/04/2023 (approximate)..   Using IUD for contraception.    She is not currently considering pregnancy.    REPRODUCTIVE/GYNECOLOGIC HISTORY:  Nicollette is sexually active with male partner(s) and is currently in monogamous relationship.   STI testing offered?  Declined  History of abnormal Pap smear:  No  SOCIAL HISTORY  Abuse: does not report having previously been physical or sexually abused.    Do you feel safe in your environment? YES       She  reports that she has never smoked. She has never used smokeless tobacco.      Last PHQ-9 score on record =       2021     8:12 AM   PHQ-9 SCORE   PHQ-9 Total Score 6     Last GAD7 score on record =        No data to display              Alcohol Score = rare     HEALTH MAINTENANCE:  Cholesterol: (No results found for: \"CHOL\" History of abnormal lipids: No  Mammo: NA . History of abnormal Mammo: Not applicable.  Regular Self Breast Exams: No  TSH: (  TSH   Date Value Ref Range Status   2013 0.76 0.4 - 5.0 mU/L Final    )  Pap; (No results found for: \"PAP\" )  Immunizations up to date: no  (Gardasil, Tdap, Flu)  Health maintenance updated:  yes    HEALTHY HABITS  Eating habits: eats regular meals  Calcium/Vitamin D intake: source:  dairy Adequate?   Exercise: How often do you exercise? 1-3 times/week;Cardio  Have you had an eye exam in the last two years? NO  Do you routinely see the dentist once or twice yearly? " YES         HISTORY:  OB History    Para Term  AB Living   4 2 2 0 2 2   SAB IAB Ectopic Multiple Live Births   2 0 0 0 2      # Outcome Date GA Lbr Isidro/2nd Weight Sex Delivery Anes PTL Lv   4 Term 11/10/22 37w1d 14:28 / 00:06 2.89 kg (6 lb 5.9 oz) F Vag-Spont EPI N ROBERT      Name: RAYMONDFEMALE-NICOLLETTE      Apgar1: 8  Apgar5: 8   3 Term 10/21/21 39w2d 03:03 / 02:18 3.345 kg (7 lb 6 oz) M Vag-Spont EPI N ROBERT      Name: RAYMONDMALE-NICOLLETTE      Apgar1: 9  Apgar5: 9   2 2014 4w0d          1 2014 6w0d    AB, COMPLETE        Past Medical History:   Diagnosis Date    Asthma     slight asthma    Constipation     Depressive disorder     depression and anxiety; pre-pregnancy on zoloft (nothing now)    Ovarian cyst, left     RSV bronchitis 10/6/2022    Urinary tract infection     Varicella     vaccinated     Past Surgical History:   Procedure Laterality Date    DILATION AND CURETTAGE SUCTION N/A 2014    Procedure: DILATION AND CURETTAGE SUCTION;  Surgeon: Petra Chase MD;  Location:  OR    ESOPHAGOSCOPY, GASTROSCOPY, DUODENOSCOPY (EGD), COMBINED  2013    Procedure: COMBINED ESOPHAGOSCOPY, GASTROSCOPY, DUODENOSCOPY (EGD);  COLONOSCOPY & ESOPHAGOSCOPY, GASTROSCOPY, DUODENOSCOPY (EGD);  Surgeon: Esthela Regalado MD;  Location:  OR    INSERT INTRAUTERINE DEVICE N/A 2014    Procedure: INSERT INTRAUTERINE DEVICE;  Surgeon: Petra Chase MD;  Location: [facility] OR    SIGMOIDOSCOPY FLEXIBLE  2013    Procedure: SIGMOIDOSCOPY FLEXIBLE;;  Surgeon: Esthela Regalado MD;  Location: [facility] OR    TONSILLECTOMY  2017     Family History   Problem Relation Age of Onset    No Known Problems Mother     Heart Disease Father     Depression Father     No Known Problems Brother     Ovarian Cancer Maternal Grandmother     Colon Cancer Maternal Grandmother     No Known Problems Paternal Grandmother     Diabetes Paternal Grandfather     Heart Disease Paternal Grandfather     Diabetes  "Brother      Social History     Socioeconomic History    Marital status:      Spouse name: Congregation    Number of children: None    Years of education: None    Highest education level: None   Occupational History    Occupation: dental asisstant   Tobacco Use    Smoking status: Never    Smokeless tobacco: Never   Vaping Use    Vaping Use: Never used   Substance and Sexual Activity    Alcohol use: No    Drug use: No    Sexual activity: Yes     Partners: Male       Current Outpatient Medications:     albuterol (PROAIR HFA/PROVENTIL HFA/VENTOLIN HFA) 108 (90 Base) MCG/ACT inhaler, Inhale 1-2 puffs into the lungs every 4 hours as needed, Disp: , Rfl:     docusate sodium (COLACE) 100 MG capsule, Take 100 mg by mouth daily, Disp: , Rfl:      Allergies   Allergen Reactions    Penicillins Nausea and Vomiting       Past medical, surgical, social and family history were reviewed and updated in [facility].    ROS:   CONSTITUTIONAL: NEGATIVE for fever, chills, change in weight  INTEGUMENTARU/SKIN: NEGATIVE for worrisome rashes, moles or lesions  EYES: NEGATIVE for vision changes or irritation  ENT: NEGATIVE for ear, mouth and throat problems  RESP: NEGATIVE for significant cough or SOB  BREAST: NEGATIVE for masses, tenderness or discharge  CV: NEGATIVE for chest pain, palpitations or peripheral edema  GI: NEGATIVE abdominal pain, heartburn, or change in bowel habits, positive for nausea   : NEGATIVE for unusual urinary or vaginal symptoms. Periods are irregular, feels a cyst on her cervix when checking for IUD string.   MUSCULOSKELETAL: NEGATIVE for significant arthralgias or myalgia  NEURO: NEGATIVE for weakness, dizziness or paresthesias  PSYCHIATRIC: NEGATIVE for changes in mood or affect    PHYSICAL EXAM:  /72   Ht 1.676 m (5' 6\")   Wt 62.6 kg (138 lb)   LMP 10/04/2023 (Approximate)   Breastfeeding No   BMI 22.27 kg/m     BMI: Body mass index is 22.27 kg/m .  Constitutional: healthy, alert and no " distress  Neck: symmetrical, thyroid normal size, no masses present, no lymphadenopathy present.   Cardiovascular: RRR, no murmurs present  Respiratory: breathing unlabored, lungs CTA bilaterally  Breast:normal without masses, tenderness or nipple discharge and no palpable axillary masses or adenopathy  Gastrointestinal: abdomen soft, non-tender, bowel sounds present  PELVIC EXAM:  Vulva: No lesions, no adenopathy, BUS WNL  Vagina: Moist, pink, discharge normal  well rugated, no lesions  Cervix:smooth, pink, no visible lesions, nabothian cyst palpated on bimanual exam, nontender, neg CMT  Uterus: Normal size, non-tender, mobile  Ovaries: No masses palpated  Rectal exam: deferred    ASSESSMENT/PLAN:    ICD-10-CM    1. Encounter for gynecological examination without abnormal finding  Z01.419       2. Screening for endocrine, metabolic and immunity disorder  Z13.29 CBC with platelets    Z13.228 Comprehensive metabolic panel (BMP + Alb, Alk Phos, ALT, AST, Total. Bili, TP)    Z13.0 Lipid panel reflex to direct LDL Non-fasting     TSH with free T4 reflex     CBC with platelets     Comprehensive metabolic panel (BMP + Alb, Alk Phos, ALT, AST, Total. Bili, TP)     Lipid panel reflex to direct LDL Non-fasting     TSH with free T4 reflex      3. Screening for cervical cancer  Z12.4 Pap screen reflex to HPV if ASCUS - recommend age 25 - 29      4. Encounter for counseling regarding contraception  Z30.09 Discussed all options available for contraception, including pills, the patch, the ring, progestin only methods, IUDs, and barrier methods. Reviewed instructions for use and risk/benefit profile for each. After consideration patient has decided on Mirena.       Needs to follow up with PCP for nausea.       Results for orders placed or performed in visit on 11/01/23   CBC with platelets     Status: Normal   Result Value Ref Range    WBC Count 7.1 4.0 - 11.0 10e3/uL    RBC Count 4.39 3.80 - 5.20 10e6/uL    Hemoglobin 13.7 11.7 -  15.7 g/dL    Hematocrit 41.5 35.0 - 47.0 %    MCV 95 78 - 100 fL    MCH 31.2 26.5 - 33.0 pg    MCHC 33.0 31.5 - 36.5 g/dL    RDW 13.0 10.0 - 15.0 %    Platelet Count 259 150 - 450 10e3/uL         COUNSELING:   Reviewed preventive health counseling, as reflected in patient instructions   reports that she has never smoked. She has never used smokeless tobacco.      ARIAN Bal, CNM

## 2023-11-01 NOTE — NURSING NOTE
"Chief Complaint   Patient presents with    Physical     Wants pap, cyst on ovary maybe and IUD concerns       Initial /72   Ht 1.676 m (5' 6\")   Wt 62.6 kg (138 lb)   LMP 10/04/2023 (Approximate)   Breastfeeding No   BMI 22.27 kg/m   Estimated body mass index is 22.27 kg/m  as calculated from the following:    Height as of this encounter: 1.676 m (5' 6\").    Weight as of this encounter: 62.6 kg (138 lb).  BP completed using cuff size: regular    Questioned patient about current smoking habits.  Pt. has never smoked.          Opal Portillo LPN on 2023 at 8:08 AM         "

## 2023-11-02 NOTE — PROGRESS NOTES
SUBJECTIVE:    Is a pregnancy test required: No.  Was a consent obtained?  Yes    Subjective: Nicollette M Armijos is a 23 year old  presents for IUD and desires Mirena type IUD.  She requests removal of the IUD because she wants to change her method of contraception    Patient has been given the opportunity to ask questions about all forms of birth control, including all options appropriate for Nicollette M Armijos. Discussed that no method of birth control, except abstinence is 100% effective against pregnancy or sexually transmitted infection.     Nicollette M Armijos understands she may have the IUD removed at any time. IUD should be removed by a health care provider and the current IUD will be removed today.    The entire removal and insertion procedure was reviewed with the patient, including care after placement.    Today's PHQ-2 Score:       2023     8:08 AM   PHQ-2 (  [employer])   Q1: Little interest or pleasure in doing things 0   Q2: Feeling down, depressed or hopeless 0   PHQ-2 Score 0       PROCEDURE:    Premedicated with ibuprofen.  A speculum exam was performed and the cervix was visualized. The IUD string was visualized. Using ring forceps, the string  was grasped and the IUD removed intact.    Under sterile technique, cervix was visualized with speculum and prepped with Betadine solution swab x 3. The uterus was sounded to 7.5 cm. IUD prepared for placement, and IUD inserted according to 's instructions without difficulty or significant ressitance, and deployed at the fundus. The strings were visualized and trimmed to 3.0 cm from the external os. Tenaculum was removed and hemostasis noted. Speculum removed.  Patient tolerated procedure well.    Lot # per MAR  Exp: per MAR    EBL: minimal    Complications: none      POST PROCEDURE:    Given 's handouts, including when to have IUD removed, list of danger s/sx, side effects and follow up recommended. Encouraged  condom use for prevention of STD. Advised to call for any fever, for prolonged or severe pain or bleeding, abnormal vaginal dischage, or unable to palpate strings. She was advised to use pain medications (ibuprofen) as needed for mild to moderate pain. Advised to follow-up in clinic in 4-6 weeks for IUD string check if unable to find strings or as directed by provider.     ARIAN Mauricio CNM

## 2023-11-04 LAB
BKR LAB AP GYN ADEQUACY: NORMAL
BKR LAB AP GYN INTERPRETATION: NORMAL
BKR LAB AP HPV REFLEX: NORMAL
BKR LAB AP LMP: NORMAL
BKR LAB AP PREVIOUS ABNORMAL: NORMAL
PATH REPORT.COMMENTS IMP SPEC: NORMAL
PATH REPORT.COMMENTS IMP SPEC: NORMAL
PATH REPORT.RELEVANT HX SPEC: NORMAL

## 2023-11-06 ENCOUNTER — OFFICE VISIT (OUTPATIENT)
Dept: MIDWIFE SERVICES | Facility: CLINIC | Age: 23
End: 2023-11-06
Payer: COMMERCIAL

## 2023-11-06 VITALS
BODY MASS INDEX: 22.34 KG/M2 | SYSTOLIC BLOOD PRESSURE: 102 MMHG | HEIGHT: 66 IN | WEIGHT: 139 LBS | DIASTOLIC BLOOD PRESSURE: 60 MMHG

## 2023-11-06 DIAGNOSIS — Z30.430 ENCOUNTER FOR INSERTION OF INTRAUTERINE CONTRACEPTIVE DEVICE: Primary | ICD-10-CM

## 2023-11-06 PROCEDURE — 58301 REMOVE INTRAUTERINE DEVICE: CPT | Performed by: ADVANCED PRACTICE MIDWIFE

## 2023-11-06 PROCEDURE — 58300 INSERT INTRAUTERINE DEVICE: CPT | Performed by: ADVANCED PRACTICE MIDWIFE

## 2023-11-06 RX ORDER — MULTIPLE VITAMINS W/ MINERALS TAB 9MG-400MCG
1 TAB ORAL DAILY
COMMUNITY

## 2023-11-06 NOTE — NURSING NOTE
"Chief Complaint   Patient presents with    IUD     Here for IUD replacement       Initial /60   Ht 1.676 m (5' 6\")   Wt 63 kg (139 lb)   LMP 10/04/2023 (Approximate)   BMI 22.44 kg/m   Estimated body mass index is 22.44 kg/m  as calculated from the following:    Height as of this encounter: 1.676 m (5' 6\").    Weight as of this encounter: 63 kg (139 lb).  BP completed using cuff size: regular    Questioned patient about current smoking habits.  Pt. has never smoked.          The following HM Due: NONE    Hanny Mireles CMA on 2023 at 1:09 PM      "

## 2023-12-18 ENCOUNTER — TELEPHONE (OUTPATIENT)
Dept: OBGYN | Facility: CLINIC | Age: 23
End: 2023-12-18
Payer: COMMERCIAL

## 2023-12-18 NOTE — TELEPHONE ENCOUNTER
Form received from: [employer]    Form requesting following info/need: Medical clearance    LOVE needed?: No    Location of form: Rosa's desk    When completed the route for return: Fax to # on form

## 2023-12-20 ENCOUNTER — TELEPHONE (OUTPATIENT)
Dept: OBGYN | Facility: CLINIC | Age: 23
End: 2023-12-20
Payer: COMMERCIAL

## 2023-12-20 NOTE — TELEPHONE ENCOUNTER
Form started and in Petra's inbox for completion and signature.   See 12/20/23 My Chart message.  Madeline Chase CMA

## 2023-12-20 NOTE — TELEPHONE ENCOUNTER
The form is on my desk in the [place] office for completion.  See telephone message from 12/18/23.  Madeline Chase CMA

## 2023-12-20 NOTE — TELEPHONE ENCOUNTER
The form is in Petra Jackson's in box for completion and signature.   Encounter forwarded to Petra Chase CMA

## 2023-12-20 NOTE — TELEPHONE ENCOUNTER
[facility] Call Center    Phone Message    May a detailed message be left on voicemail: yes     Reason for Call: Form or Letter   Type or form/letter needing completion: Medical Clearance Form   Provider: Petra Jackson CNM    Date form needed: ASAP  Once completed: Fax form to: Kathy at [facility]. Call Kathy to confirm fax and if fax was received please.  Coordinator can be reached at [phone]    Action Taken: Message routed to:  Other: [facility]    Travel Screening: Not Applicable

## 2024-03-19 ENCOUNTER — TELEPHONE (OUTPATIENT)
Dept: OBGYN | Facility: CLINIC | Age: 24
End: 2024-03-19
Payer: COMMERCIAL

## 2024-03-19 NOTE — TELEPHONE ENCOUNTER
[facility] Call Center    Phone Message    May a detailed message be left on voicemail: yes     Reason for Call: Other: Ade from [employer] is calling because she has a few questions about some labs and US's and if they are done here.Please contact Ade to discuss further   Tests needing done-  US- Transvaginal US measuring uterine lining   Blood work- STAT estradiol progesterone Beta HCG Quant    Action Taken: Other: [facility]    Travel Screening: Not Applicable

## 2024-03-19 NOTE — TELEPHONE ENCOUNTER
Spoke to Ade, gave all directions for pt to get U/s and lab through [facility] or [facility].      Susanne MAYER RN BSN

## 2024-03-23 NOTE — TELEPHONE ENCOUNTER
Pt calls with small amount of fluid in her underwear while sitting.    Advised to lay down for 30 min then get back up to see if any more fluid trickles out.    Susanne MAYER RN BSN         Spontaneous vertex

## 2024-07-22 ENCOUNTER — LAB (OUTPATIENT)
Dept: LAB | Facility: CLINIC | Age: 24
End: 2024-07-22
Payer: COMMERCIAL

## 2024-07-22 DIAGNOSIS — Z31.83 ENCOUNTER FOR ASSISTED REPRODUCTIVE FERTILITY CYCLE: Primary | ICD-10-CM

## 2024-07-22 LAB — ESTRADIOL SERPL-MCNC: 15 PG/ML

## 2024-07-22 PROCEDURE — 36415 COLL VENOUS BLD VENIPUNCTURE: CPT

## 2024-07-22 PROCEDURE — 82670 ASSAY OF TOTAL ESTRADIOL: CPT

## 2024-08-05 ENCOUNTER — HOSPITAL ENCOUNTER (OUTPATIENT)
Dept: ULTRASOUND IMAGING | Facility: CLINIC | Age: 24
Discharge: HOME | End: 2024-08-05
Attending: SPECIALIST
Payer: COMMERCIAL

## 2024-08-05 ENCOUNTER — LAB (OUTPATIENT)
Dept: LAB | Facility: CLINIC | Age: 24
End: 2024-08-05
Attending: SPECIALIST
Payer: COMMERCIAL

## 2024-08-05 DIAGNOSIS — Z31.83 ENCOUNTER FOR ASSISTED REPRODUCTIVE FERTILITY CYCLE: Primary | ICD-10-CM

## 2024-08-05 DIAGNOSIS — Z31.83 ENCOUNTER FOR ASSISTED REPRODUCTIVE FERTILITY CYCLE: ICD-10-CM

## 2024-08-05 LAB — ESTRADIOL SERPL-MCNC: 63 PG/ML

## 2024-08-05 PROCEDURE — 36415 COLL VENOUS BLD VENIPUNCTURE: CPT

## 2024-08-05 PROCEDURE — 76830 TRANSVAGINAL US NON-OB: CPT

## 2024-08-05 PROCEDURE — 76856 US EXAM PELVIC COMPLETE: CPT

## 2024-08-05 PROCEDURE — 82670 ASSAY OF TOTAL ESTRADIOL: CPT

## 2024-08-12 ENCOUNTER — LAB (OUTPATIENT)
Dept: LAB | Facility: CLINIC | Age: 24
End: 2024-08-12
Attending: SPECIALIST
Payer: COMMERCIAL

## 2024-08-12 ENCOUNTER — HOSPITAL ENCOUNTER (OUTPATIENT)
Dept: ULTRASOUND IMAGING | Facility: CLINIC | Age: 24
Discharge: HOME | End: 2024-08-12
Attending: SPECIALIST
Payer: COMMERCIAL

## 2024-08-12 DIAGNOSIS — Z31.83 ENCOUNTER FOR ASSISTED REPRODUCTIVE FERTILITY CYCLE: ICD-10-CM

## 2024-08-12 LAB — ESTRADIOL SERPL-MCNC: 520 PG/ML

## 2024-08-12 PROCEDURE — 82670 ASSAY OF TOTAL ESTRADIOL: CPT

## 2024-08-12 PROCEDURE — 36415 COLL VENOUS BLD VENIPUNCTURE: CPT

## 2024-08-12 PROCEDURE — 76830 TRANSVAGINAL US NON-OB: CPT

## 2024-10-28 ENCOUNTER — LAB (OUTPATIENT)
Dept: LAB | Facility: CLINIC | Age: 24
End: 2024-10-28
Payer: COMMERCIAL

## 2024-10-28 DIAGNOSIS — Z31.83 ENCOUNTER FOR ASSISTED REPRODUCTIVE FERTILITY PROCEDURE CYCLE: ICD-10-CM

## 2024-10-28 LAB — ESTRADIOL SERPL-MCNC: 36 PG/ML

## 2024-10-28 PROCEDURE — 36415 COLL VENOUS BLD VENIPUNCTURE: CPT

## 2024-10-28 PROCEDURE — 82670 ASSAY OF TOTAL ESTRADIOL: CPT

## 2024-11-04 ENCOUNTER — LAB (OUTPATIENT)
Dept: LAB | Facility: CLINIC | Age: 24
End: 2024-11-04
Payer: COMMERCIAL

## 2024-11-04 DIAGNOSIS — Z31.83 ENCOUNTER FOR ASSISTED REPRODUCTIVE FERTILITY PROCEDURE CYCLE: ICD-10-CM

## 2024-11-04 LAB
ESTRADIOL SERPL-MCNC: 441 PG/ML
PROGEST SERPL-MCNC: 0.3 NG/ML

## 2024-11-04 PROCEDURE — 84144 ASSAY OF PROGESTERONE: CPT

## 2024-11-04 PROCEDURE — 82670 ASSAY OF TOTAL ESTRADIOL: CPT

## 2024-11-04 PROCEDURE — 36415 COLL VENOUS BLD VENIPUNCTURE: CPT

## 2024-11-11 ENCOUNTER — HOSPITAL ENCOUNTER (OUTPATIENT)
Dept: ULTRASOUND IMAGING | Facility: CLINIC | Age: 24
Discharge: HOME | End: 2024-11-11
Attending: SPECIALIST
Payer: COMMERCIAL

## 2024-11-11 ENCOUNTER — LAB (OUTPATIENT)
Dept: LAB | Facility: CLINIC | Age: 24
End: 2024-11-11
Attending: SPECIALIST
Payer: COMMERCIAL

## 2024-11-11 DIAGNOSIS — Z31.83 ENCOUNTER FOR ASSISTED REPRODUCTIVE FERTILITY CYCLE: ICD-10-CM

## 2024-11-11 DIAGNOSIS — Z31.83 ENCOUNTER FOR ASSISTED REPRODUCTIVE FERTILITY PROCEDURE CYCLE: ICD-10-CM

## 2024-11-11 LAB — ESTRADIOL SERPL-MCNC: 1055 PG/ML

## 2024-11-11 PROCEDURE — 36415 COLL VENOUS BLD VENIPUNCTURE: CPT

## 2024-11-11 PROCEDURE — 76830 TRANSVAGINAL US NON-OB: CPT

## 2024-11-11 PROCEDURE — 82670 ASSAY OF TOTAL ESTRADIOL: CPT

## 2024-11-15 ENCOUNTER — LAB (OUTPATIENT)
Dept: LAB | Facility: CLINIC | Age: 24
End: 2024-11-15
Payer: COMMERCIAL

## 2024-11-15 DIAGNOSIS — O09.811 PREGNANCY RESULTING FROM ASSISTED REPRODUCTIVE TECHNOLOGY, FIRST TRIMESTER: ICD-10-CM

## 2024-11-15 DIAGNOSIS — Z31.83 ENCOUNTER FOR ASSISTED REPRODUCTIVE FERTILITY PROCEDURE CYCLE: ICD-10-CM

## 2024-11-15 LAB
ESTRADIOL SERPL-MCNC: 1382 PG/ML
PROGEST SERPL-MCNC: 15.1 NG/ML

## 2024-11-15 PROCEDURE — 82670 ASSAY OF TOTAL ESTRADIOL: CPT

## 2024-11-15 PROCEDURE — 84144 ASSAY OF PROGESTERONE: CPT

## 2024-11-15 PROCEDURE — 36415 COLL VENOUS BLD VENIPUNCTURE: CPT

## 2024-11-29 ENCOUNTER — LAB (OUTPATIENT)
Dept: LAB | Facility: CLINIC | Age: 24
End: 2024-11-29
Payer: COMMERCIAL

## 2024-11-29 DIAGNOSIS — Z31.83 ENCOUNTER FOR ASSISTED REPRODUCTIVE FERTILITY PROCEDURE CYCLE: ICD-10-CM

## 2024-11-29 LAB — HCG INTACT+B SERPL-ACNC: <1 MIU/ML

## 2024-11-29 PROCEDURE — 36415 COLL VENOUS BLD VENIPUNCTURE: CPT

## 2024-11-29 PROCEDURE — 84702 CHORIONIC GONADOTROPIN TEST: CPT

## 2024-12-29 ENCOUNTER — HEALTH MAINTENANCE LETTER (OUTPATIENT)
Age: 24
End: 2024-12-29

## 2025-03-27 ENCOUNTER — APPOINTMENT (OUTPATIENT)
Dept: CT IMAGING | Facility: CLINIC | Age: 25
End: 2025-03-27
Attending: EMERGENCY MEDICINE
Payer: COMMERCIAL

## 2025-03-27 ENCOUNTER — APPOINTMENT (OUTPATIENT)
Dept: ULTRASOUND IMAGING | Facility: CLINIC | Age: 25
End: 2025-03-27
Attending: EMERGENCY MEDICINE
Payer: COMMERCIAL

## 2025-03-27 ENCOUNTER — HOSPITAL ENCOUNTER (EMERGENCY)
Facility: CLINIC | Age: 25
Discharge: HOME | End: 2025-03-28
Attending: EMERGENCY MEDICINE
Payer: COMMERCIAL

## 2025-03-27 DIAGNOSIS — Z87.19 HISTORY OF CHRONIC CONSTIPATION: ICD-10-CM

## 2025-03-27 DIAGNOSIS — R10.9 RIGHT SIDED ABDOMINAL PAIN: ICD-10-CM

## 2025-03-27 LAB
ALBUMIN SERPL BCG-MCNC: 4.5 G/DL (ref 3.5–5.2)
ALBUMIN UR-MCNC: NEGATIVE MG/DL
ALP SERPL-CCNC: 118 U/L (ref 40–150)
ALT SERPL W P-5'-P-CCNC: 16 U/L (ref 0–50)
ANION GAP SERPL CALCULATED.3IONS-SCNC: 12 MMOL/L (ref 7–15)
APPEARANCE UR: CLEAR
AST SERPL W P-5'-P-CCNC: 17 U/L (ref 0–45)
ATRIAL RATE - MUSE: 72 BPM
BASOPHILS # BLD AUTO: 0 10E3/UL (ref 0–0.2)
BASOPHILS NFR BLD AUTO: 0 %
BILIRUB SERPL-MCNC: 0.2 MG/DL
BILIRUB UR QL STRIP: NEGATIVE
BUN SERPL-MCNC: 9.9 MG/DL (ref 6–20)
CALCIUM SERPL-MCNC: 9.2 MG/DL (ref 8.8–10.4)
CHLORIDE SERPL-SCNC: 102 MMOL/L (ref 98–107)
COLOR UR AUTO: ABNORMAL
CREAT SERPL-MCNC: 0.89 MG/DL (ref 0.51–0.95)
DIASTOLIC BLOOD PRESSURE - MUSE: NORMAL MMHG
EGFRCR SERPLBLD CKD-EPI 2021: >90 ML/MIN/1.73M2
EOSINOPHIL # BLD AUTO: 0.1 10E3/UL (ref 0–0.7)
EOSINOPHIL NFR BLD AUTO: 1 %
ERYTHROCYTE [DISTWIDTH] IN BLOOD BY AUTOMATED COUNT: 13.1 % (ref 10–15)
GLUCOSE SERPL-MCNC: 99 MG/DL (ref 70–99)
GLUCOSE UR STRIP-MCNC: NEGATIVE MG/DL
HCG SERPL QL: NEGATIVE
HCO3 SERPL-SCNC: 25 MMOL/L (ref 22–29)
HCT VFR BLD AUTO: 39.8 % (ref 35–47)
HGB BLD-MCNC: 13 G/DL (ref 11.7–15.7)
HGB UR QL STRIP: NEGATIVE
HOLD SPECIMEN: NORMAL
IMM GRANULOCYTES # BLD: 0 10E3/UL
IMM GRANULOCYTES NFR BLD: 0 %
INTERPRETATION ECG - MUSE: NORMAL
KETONES UR STRIP-MCNC: NEGATIVE MG/DL
LEUKOCYTE ESTERASE UR QL STRIP: NEGATIVE
LIPASE SERPL-CCNC: 22 U/L (ref 13–60)
LYMPHOCYTES # BLD AUTO: 3 10E3/UL (ref 0.8–5.3)
LYMPHOCYTES NFR BLD AUTO: 33 %
MCH RBC QN AUTO: 30.4 PG (ref 26.5–33)
MCHC RBC AUTO-ENTMCNC: 32.7 G/DL (ref 31.5–36.5)
MCV RBC AUTO: 93 FL (ref 78–100)
MONOCYTES # BLD AUTO: 0.8 10E3/UL (ref 0–1.3)
MONOCYTES NFR BLD AUTO: 9 %
MUCOUS THREADS #/AREA URNS LPF: PRESENT /LPF
NEUTROPHILS # BLD AUTO: 5.2 10E3/UL (ref 1.6–8.3)
NEUTROPHILS NFR BLD AUTO: 57 %
NITRATE UR QL: NEGATIVE
NRBC # BLD AUTO: 0 10E3/UL
NRBC BLD AUTO-RTO: 0 /100
P AXIS - MUSE: 77 DEGREES
PH UR STRIP: 6 [PH] (ref 5–7)
PLATELET # BLD AUTO: 259 10E3/UL (ref 150–450)
POTASSIUM SERPL-SCNC: 3.8 MMOL/L (ref 3.4–5.3)
PR INTERVAL - MUSE: 130 MS
PROT SERPL-MCNC: 7.1 G/DL (ref 6.4–8.3)
QRS DURATION - MUSE: 70 MS
QT - MUSE: 364 MS
QTC - MUSE: 398 MS
R AXIS - MUSE: 82 DEGREES
RBC # BLD AUTO: 4.28 10E6/UL (ref 3.8–5.2)
RBC URINE: 1 /HPF
SODIUM SERPL-SCNC: 139 MMOL/L (ref 135–145)
SP GR UR STRIP: 1.02 (ref 1–1.03)
SQUAMOUS EPITHELIAL: 3 /HPF
SYSTOLIC BLOOD PRESSURE - MUSE: NORMAL MMHG
T AXIS - MUSE: 72 DEGREES
UROBILINOGEN UR STRIP-MCNC: NORMAL MG/DL
VENTRICULAR RATE- MUSE: 72 BPM
WBC # BLD AUTO: 9.2 10E3/UL (ref 4–11)
WBC URINE: 1 /HPF

## 2025-03-27 PROCEDURE — 99285 EMERGENCY DEPT VISIT HI MDM: CPT | Mod: 25

## 2025-03-27 PROCEDURE — 36415 COLL VENOUS BLD VENIPUNCTURE: CPT | Performed by: EMERGENCY MEDICINE

## 2025-03-27 PROCEDURE — 81001 URINALYSIS AUTO W/SCOPE: CPT | Performed by: EMERGENCY MEDICINE

## 2025-03-27 PROCEDURE — 74177 CT ABD & PELVIS W/CONTRAST: CPT

## 2025-03-27 PROCEDURE — 80053 COMPREHEN METABOLIC PANEL: CPT | Performed by: EMERGENCY MEDICINE

## 2025-03-27 PROCEDURE — 85004 AUTOMATED DIFF WBC COUNT: CPT | Performed by: EMERGENCY MEDICINE

## 2025-03-27 PROCEDURE — 76705 ECHO EXAM OF ABDOMEN: CPT

## 2025-03-27 PROCEDURE — 84703 CHORIONIC GONADOTROPIN ASSAY: CPT | Performed by: EMERGENCY MEDICINE

## 2025-03-27 PROCEDURE — 83690 ASSAY OF LIPASE: CPT | Performed by: EMERGENCY MEDICINE

## 2025-03-27 PROCEDURE — 96374 THER/PROPH/DIAG INJ IV PUSH: CPT | Mod: 59

## 2025-03-27 PROCEDURE — 250N000009 HC RX 250: Performed by: EMERGENCY MEDICINE

## 2025-03-27 PROCEDURE — 250N000011 HC RX IP 250 OP 636: Performed by: EMERGENCY MEDICINE

## 2025-03-27 PROCEDURE — 96375 TX/PRO/DX INJ NEW DRUG ADDON: CPT

## 2025-03-27 PROCEDURE — 85018 HEMOGLOBIN: CPT | Performed by: EMERGENCY MEDICINE

## 2025-03-27 PROCEDURE — 93005 ELECTROCARDIOGRAM TRACING: CPT

## 2025-03-27 RX ORDER — FAMOTIDINE 20 MG/1
20 TABLET, FILM COATED ORAL 2 TIMES DAILY
Qty: 30 TABLET | Refills: 0 | Status: SHIPPED | OUTPATIENT
Start: 2025-03-27

## 2025-03-27 RX ORDER — SERTRALINE HYDROCHLORIDE 100 MG/1
100 TABLET, FILM COATED ORAL DAILY
COMMUNITY

## 2025-03-27 RX ORDER — ONDANSETRON 4 MG/1
4 TABLET, ORALLY DISINTEGRATING ORAL EVERY 6 HOURS PRN
Qty: 10 TABLET | Refills: 0 | Status: SHIPPED | OUTPATIENT
Start: 2025-03-27 | End: 2025-03-30

## 2025-03-27 RX ORDER — ONDANSETRON 2 MG/ML
4 INJECTION INTRAMUSCULAR; INTRAVENOUS ONCE
Status: COMPLETED | OUTPATIENT
Start: 2025-03-27 | End: 2025-03-27

## 2025-03-27 RX ORDER — HYDROMORPHONE HYDROCHLORIDE 1 MG/ML
0.5 INJECTION, SOLUTION INTRAMUSCULAR; INTRAVENOUS; SUBCUTANEOUS
Status: DISCONTINUED | OUTPATIENT
Start: 2025-03-27 | End: 2025-03-28 | Stop reason: HOSPADM

## 2025-03-27 RX ORDER — IOPAMIDOL 755 MG/ML
500 INJECTION, SOLUTION INTRAVASCULAR ONCE
Status: COMPLETED | OUTPATIENT
Start: 2025-03-27 | End: 2025-03-27

## 2025-03-27 RX ORDER — SUCRALFATE 1 G/1
1 TABLET ORAL 4 TIMES DAILY
Qty: 30 TABLET | Refills: 0 | Status: SHIPPED | OUTPATIENT
Start: 2025-03-27

## 2025-03-27 RX ADMIN — HYDROMORPHONE HYDROCHLORIDE 0.5 MG: 1 INJECTION, SOLUTION INTRAMUSCULAR; INTRAVENOUS; SUBCUTANEOUS at 21:51

## 2025-03-27 RX ADMIN — ONDANSETRON 4 MG: 2 INJECTION, SOLUTION INTRAMUSCULAR; INTRAVENOUS at 21:39

## 2025-03-27 RX ADMIN — FAMOTIDINE 20 MG: 10 INJECTION, SOLUTION INTRAVENOUS at 21:40

## 2025-03-27 RX ADMIN — SODIUM CHLORIDE 59 ML: 9 INJECTION, SOLUTION INTRAVENOUS at 23:44

## 2025-03-27 RX ADMIN — IOPAMIDOL 69 ML: 755 INJECTION, SOLUTION INTRAVENOUS at 23:43

## 2025-03-27 ASSESSMENT — COLUMBIA-SUICIDE SEVERITY RATING SCALE - C-SSRS
2. HAVE YOU ACTUALLY HAD ANY THOUGHTS OF KILLING YOURSELF IN THE PAST MONTH?: NO
1. IN THE PAST MONTH, HAVE YOU WISHED YOU WERE DEAD OR WISHED YOU COULD GO TO SLEEP AND NOT WAKE UP?: NO
6. HAVE YOU EVER DONE ANYTHING, STARTED TO DO ANYTHING, OR PREPARED TO DO ANYTHING TO END YOUR LIFE?: NO

## 2025-03-27 ASSESSMENT — ACTIVITIES OF DAILY LIVING (ADL)
ADLS_ACUITY_SCORE: 48
ADLS_ACUITY_SCORE: 48

## 2025-03-28 VITALS
TEMPERATURE: 98.6 F | HEIGHT: 66 IN | RESPIRATION RATE: 15 BRPM | WEIGHT: 137.13 LBS | SYSTOLIC BLOOD PRESSURE: 109 MMHG | DIASTOLIC BLOOD PRESSURE: 74 MMHG | HEART RATE: 73 BPM | OXYGEN SATURATION: 98 % | BODY MASS INDEX: 22.04 KG/M2

## 2025-03-28 LAB
ATRIAL RATE - MUSE: 72 BPM
DIASTOLIC BLOOD PRESSURE - MUSE: NORMAL MMHG
INTERPRETATION ECG - MUSE: NORMAL
P AXIS - MUSE: 77 DEGREES
PR INTERVAL - MUSE: 130 MS
QRS DURATION - MUSE: 70 MS
QT - MUSE: 364 MS
QTC - MUSE: 398 MS
R AXIS - MUSE: 82 DEGREES
SYSTOLIC BLOOD PRESSURE - MUSE: NORMAL MMHG
T AXIS - MUSE: 72 DEGREES
VENTRICULAR RATE- MUSE: 72 BPM

## 2025-03-28 NOTE — ED PROVIDER NOTES
"  Emergency Department Note      History of Present Illness     Chief Complaint   Chest Pain and Back Pain    HPI   Nicollette M Armijos is a 24 year old female presenting with chest pain and back pain. The patient woke up around 0000 this morning with abdominal pain and associated nausea. Around 1400, her pain worsened and her back pain is now unmanageable despite taking Tylenol and ibuprofen. Her pain worsens with eating. He recent hematuria, frequency, urgency or diarrhea. Lorrie reports having a history of constipation, but notes her current symptoms feel different. No other chronic medical problems. No recent drug or alcohol use. No history of abdominal surgery.     Independent Historian   None    Review of External Notes   None    Past Medical History     Medical History and Problem List   Asthma  Anxiety   Adjustment disorder   Constipation  Depressive disorder  Ovarian cyst, left  RSV bronchitis  Urinary tract infection  Varicella    Medications   Albuterol   Sertraline     Surgical History   D&C  EGD  IUD implant   IUD  Sigmoidoscopy   Tonsillectomy    Physical Exam     Patient Vitals for the past 24 hrs:   BP Temp Temp src Pulse Resp SpO2 Height Weight   03/27/25 2320 -- -- -- 66 15 99 % -- --   03/27/25 2147 106/77 -- -- 68 14 98 % -- --   03/27/25 2113 114/82 -- -- -- -- -- -- --   03/27/25 2111 (!) 145/75 98.6  F (37  C) Oral 97 22 100 % -- --   03/27/25 2109 -- -- -- -- -- -- 1.676 m (5' 6\") 62.2 kg (137 lb 2 oz)     Physical Exam  Constitutional: Alert, attentive, GCS 15   Eyes: EOM are normal, anicteric, conjugate gaze  CV: distal extremities warm, well perfused  Chest: Non-labored breathing on RA  GI:  focal RUQ tenderness. No distension. No guarding or rebound.    Neurological: Alert, attentive, moving all extremities equally.   Skin: Skin is warm and dry.    Diagnostics     Lab Results   Labs Ordered and Resulted from Time of ED Arrival to Time of ED Departure   ROUTINE UA WITH MICROSCOPIC - " Abnormal       Result Value    Color Urine Light Yellow      Appearance Urine Clear      Glucose Urine Negative      Bilirubin Urine Negative      Ketones Urine Negative      Specific Gravity Urine 1.020      Blood Urine Negative      pH Urine 6.0      Protein Albumin Urine Negative      Urobilinogen Urine Normal      Nitrite Urine Negative      Leukocyte Esterase Urine Negative      Mucus Urine Present (*)     RBC Urine 1      WBC Urine 1      Squamous Epithelials Urine 3 (*)    COMPREHENSIVE METABOLIC PANEL - Normal    Sodium 139      Potassium 3.8      Carbon Dioxide (CO2) 25      Anion Gap 12      Urea Nitrogen 9.9      Creatinine 0.89      GFR Estimate >90      Calcium 9.2      Chloride 102      Glucose 99      Alkaline Phosphatase 118      AST 17      ALT 16      Protein Total 7.1      Albumin 4.5      Bilirubin Total 0.2     LIPASE - Normal    Lipase 22     HCG QUALITATIVE PREGNANCY - Normal    hCG Serum Qualitative Negative     CBC WITH PLATELETS AND DIFFERENTIAL    WBC Count 9.2      RBC Count 4.28      Hemoglobin 13.0      Hematocrit 39.8      MCV 93      MCH 30.4      MCHC 32.7      RDW 13.1      Platelet Count 259      % Neutrophils 57      % Lymphocytes 33      % Monocytes 9      % Eosinophils 1      % Basophils 0      % Immature Granulocytes 0      NRBCs per 100 WBC 0      Absolute Neutrophils 5.2      Absolute Lymphocytes 3.0      Absolute Monocytes 0.8      Absolute Eosinophils 0.1      Absolute Basophils 0.0      Absolute Immature Granulocytes 0.0      Absolute NRBCs 0.0       Imaging   US Abdomen Limited   Final Result   IMPRESSION:   1.  Normal limited abdominal ultrasound.            Abd/pelvis CT,  IV  contrast only TRAUMA / AAA    (Results Pending)     EKG   ECG results from 03/27/25   EKG 12 lead     Value    Systolic Blood Pressure     Diastolic Blood Pressure     Ventricular Rate 72    Atrial Rate 72    AK Interval 130    QRS Duration 70        QTc 398    P Axis 77    R AXIS 82    T  Axis 72    Interpretation ECG      Sinus rhythm  Normal ECG  No previous ECGs available  EKG interpreted by me at 2140        Independent Interpretation   None    ED Course      Medications Administered   Medications   HYDROmorphone (PF) (DILAUDID) injection 0.5 mg (0.5 mg Intravenous $Given 3/27/25 2151)   CT Scan Flush (has no administration in time range)   iopamidol (ISOVUE-370) solution 500 mL (has no administration in time range)   ondansetron (ZOFRAN) injection 4 mg (4 mg Intravenous $Given 3/27/25 2139)   famotidine (PEPCID) injection 20 mg (20 mg Intravenous $Given 3/27/25 2140)       Procedures   Procedures     Discussion of Management   None    ED Course   ED Course as of 03/27/25 2343   Thu Mar 27, 2025   2130 I obtained the history and examined the patient as noted above.          Additional Documentation  None    Medical Decision Making / Diagnosis     CMS Diagnoses: None    MIPS       None    [facility]   Nicollette M Armijos is a 24 year old female past medical history significant for recurrent constipation, anxiety presenting for initially severe upper abdominal pain with focal right upper quadrant tenderness on exam.  However right upper quadrant ultrasound is unrevealing as are her screening labs including urine.  On recheck, she was having persistent pain however now more right mid right lower prompting decision to proceed with CT imaging which is pending.  If negative I would put her empirically on Pepcid, Carafate and Zofran give epigastric pain and possible GERD/Ulcer and have her follow-up closely with her PCP. I do not suspect cardiopulmonary etiologies based on exam, EKG wnl.     Disposition   Patient signed out to Dr. Chacon Pending CT.    Diagnosis     ICD-10-CM    1. Right sided abdominal pain  R10.9       2. History of chronic constipation  Z87.19            Discharge Medications   New Prescriptions    FAMOTIDINE (PEPCID) 20 MG TABLET    Take 1 tablet (20 mg) by mouth 2 times daily.     ONDANSETRON (ZOFRAN ODT) 4 MG ODT TAB    Take 1 tablet (4 mg) by mouth every 6 hours as needed for nausea or vomiting.    SUCRALFATE (CARAFATE) 1 GM TABLET    Take 1 tablet (1 g) by mouth 4 times daily.     Brent Jay MD  [employer]  11:42 PM 03/27/25     Scribe Disclosure:  I, Luly Quintero, am serving as a scribe at 9:18 PM on 3/27/2025 to document services personally performed by Brent Jay MD based on my observations and the provider's statements to me.        Brent Jay MD  03/27/25 4528

## 2025-03-28 NOTE — ED TRIAGE NOTES
Pt. Presents to ED with epigastric pain that radiates directly through to her back. Reports the pain started around midnight and has worsened in the last 7 hours. Pt. Reports nausea without vomiting, denies diarrhea, normal BM today. Reports the epigastric pain is sharp/stabbing and the pain in her back feels more achy and sore. A & O x 4, independent. AVSS on RA.

## 2025-04-04 ENCOUNTER — ANCILLARY ORDERS (OUTPATIENT)
Dept: ULTRASOUND IMAGING | Facility: CLINIC | Age: 25
End: 2025-04-04
Payer: COMMERCIAL

## 2025-04-04 DIAGNOSIS — Z31.83 ENCOUNTER FOR ASSISTED REPRODUCTIVE FERTILITY CYCLE: Primary | ICD-10-CM

## 2025-04-07 DIAGNOSIS — Z31.83 ENCOUNTER FOR ASSISTED REPRODUCTIVE FERTILITY CYCLE: Primary | ICD-10-CM
